# Patient Record
Sex: FEMALE | Race: WHITE | NOT HISPANIC OR LATINO | Employment: FULL TIME | URBAN - METROPOLITAN AREA
[De-identification: names, ages, dates, MRNs, and addresses within clinical notes are randomized per-mention and may not be internally consistent; named-entity substitution may affect disease eponyms.]

---

## 2017-01-03 ENCOUNTER — TELEPHONE (OUTPATIENT)
Dept: FAMILY MEDICINE | Age: 42
End: 2017-01-03

## 2017-01-03 DIAGNOSIS — L98.9 LESION OF SKIN OF FACE: Primary | ICD-10-CM

## 2017-05-24 RX ORDER — LISINOPRIL 20 MG/1
TABLET ORAL
Qty: 30 TABLET | Refills: 10 | OUTPATIENT
Start: 2017-05-24

## 2017-05-24 RX ORDER — LEVOTHYROXINE SODIUM 137 UG/1
TABLET ORAL
Qty: 30 TABLET | Refills: 10 | OUTPATIENT
Start: 2017-05-24

## 2017-05-25 ENCOUNTER — TELEPHONE (OUTPATIENT)
Dept: FAMILY MEDICINE | Age: 42
End: 2017-05-25

## 2017-05-25 ENCOUNTER — LAB SERVICES (OUTPATIENT)
Dept: LAB | Age: 42
End: 2017-05-25

## 2017-05-25 ENCOUNTER — OFFICE VISIT (OUTPATIENT)
Dept: FAMILY MEDICINE | Age: 42
End: 2017-05-25

## 2017-05-25 VITALS
HEART RATE: 70 BPM | SYSTOLIC BLOOD PRESSURE: 118 MMHG | TEMPERATURE: 98.5 F | WEIGHT: 177.5 LBS | DIASTOLIC BLOOD PRESSURE: 72 MMHG | BODY MASS INDEX: 28.53 KG/M2 | HEIGHT: 66 IN

## 2017-05-25 DIAGNOSIS — Z09 FOLLOW-UP EXAMINATION FOLLOWING COMPLETED TREATMENT WITH HIGH-RISK MEDICATIONS, NOT ELSEWHERE CLASSIFIED: Primary | ICD-10-CM

## 2017-05-25 DIAGNOSIS — Z09 FOLLOW-UP EXAMINATION FOLLOWING TREATMENT WITH HIGH-RISK MEDICATION: Primary | ICD-10-CM

## 2017-05-25 DIAGNOSIS — Z09 FOLLOW-UP EXAMINATION FOLLOWING COMPLETED TREATMENT WITH HIGH-RISK MEDICATIONS, NOT ELSEWHERE CLASSIFIED: ICD-10-CM

## 2017-05-25 LAB
ALBUMIN SERPL-MCNC: 3.8 G/DL (ref 3.6–5.1)
ALBUMIN/GLOB SERPL: 1 {RATIO} (ref 1–2.4)
ALP SERPL-CCNC: 89 UNITS/L (ref 45–117)
ALT SERPL-CCNC: 20 UNITS/L
ANION GAP SERPL CALC-SCNC: 15 MMOL/L (ref 10–20)
AST SERPL-CCNC: 15 UNITS/L
BILIRUB SERPL-MCNC: 0.3 MG/DL (ref 0.2–1)
BUN SERPL-MCNC: 11 MG/DL (ref 6–20)
BUN/CREAT SERPL: 17 (ref 7–25)
CALCIUM SERPL-MCNC: 9.1 MG/DL (ref 8.4–10.2)
CHLORIDE SERPL-SCNC: 104 MMOL/L (ref 98–107)
CHOLEST SERPL-MCNC: 229 MG/DL
CHOLEST/HDLC SERPL: 4.2 {RATIO}
CO2 SERPL-SCNC: 24 MMOL/L (ref 21–32)
CREAT SERPL-MCNC: 0.64 MG/DL (ref 0.51–0.95)
FASTING STATUS PATIENT QL REPORTED: 3.5 HRS
GLOBULIN SER-MCNC: 3.7 G/DL (ref 2–4)
GLUCOSE SERPL-MCNC: 117 MG/DL (ref 65–99)
HDLC SERPL-MCNC: 55 MG/DL
LDLC SERPL-MCNC: 129 MG/DL
LENGTH OF FAST TIME PATIENT: 3.5 HRS
NONHDLC SERPL-MCNC: 174 MG/DL
POTASSIUM SERPL-SCNC: 4.3 MMOL/L (ref 3.4–5.1)
PROT SERPL-MCNC: 7.5 G/DL (ref 6.4–8.2)
SODIUM SERPL-SCNC: 139 MMOL/L (ref 135–145)
TRIGL SERPL-MCNC: 227 MG/DL
TSH SERPL-ACNC: 4.95 MCUNITS/ML (ref 0.35–5)

## 2017-05-25 PROCEDURE — 84443 ASSAY THYROID STIM HORMONE: CPT | Performed by: INTERNAL MEDICINE

## 2017-05-25 PROCEDURE — 99214 OFFICE O/P EST MOD 30 MIN: CPT | Performed by: EMERGENCY MEDICINE

## 2017-05-25 PROCEDURE — 80053 COMPREHEN METABOLIC PANEL: CPT | Performed by: INTERNAL MEDICINE

## 2017-05-25 PROCEDURE — 36415 COLL VENOUS BLD VENIPUNCTURE: CPT | Performed by: INTERNAL MEDICINE

## 2017-05-25 RX ORDER — LISINOPRIL 20 MG/1
TABLET ORAL
Qty: 30 TABLET | Refills: 10 | Status: SHIPPED | OUTPATIENT
Start: 2017-05-25

## 2017-05-25 RX ORDER — LEVOTHYROXINE SODIUM 137 UG/1
TABLET ORAL
Qty: 30 TABLET | Refills: 0 | Status: SHIPPED | OUTPATIENT
Start: 2017-05-25 | End: 2017-05-26 | Stop reason: DRUGHIGH

## 2017-05-26 ENCOUNTER — TELEPHONE (OUTPATIENT)
Dept: FAMILY MEDICINE | Age: 42
End: 2017-05-26

## 2017-05-26 DIAGNOSIS — E03.9 HYPOTHYROIDISM, UNSPECIFIED TYPE: Primary | ICD-10-CM

## 2017-05-26 RX ORDER — LEVOTHYROXINE SODIUM 0.15 MG/1
150 TABLET ORAL
Qty: 30 TABLET | Refills: 1 | Status: SHIPPED | OUTPATIENT
Start: 2017-05-26 | End: 2017-08-04 | Stop reason: SDUPTHER

## 2017-06-14 ENCOUNTER — HOSPITAL ENCOUNTER (OUTPATIENT)
Dept: MAMMOGRAPHY | Age: 42
Discharge: HOME | End: 2017-06-14
Attending: EMERGENCY MEDICINE

## 2017-06-14 DIAGNOSIS — Z12.31 VISIT FOR SCREENING MAMMOGRAM: ICD-10-CM

## 2017-06-14 PROCEDURE — G0202 SCR MAMMO BI INCL CAD: HCPCS

## 2017-07-07 ENCOUNTER — TELEPHONE (OUTPATIENT)
Dept: FAMILY MEDICINE | Age: 42
End: 2017-07-07

## 2017-08-03 ENCOUNTER — LAB SERVICES (OUTPATIENT)
Dept: LAB | Age: 42
End: 2017-08-03

## 2017-08-03 DIAGNOSIS — E03.9 HYPOTHYROIDISM, UNSPECIFIED TYPE: ICD-10-CM

## 2017-08-03 LAB — TSH SERPL-ACNC: 0.75 MCUNITS/ML (ref 0.35–5)

## 2017-08-03 PROCEDURE — 36415 COLL VENOUS BLD VENIPUNCTURE: CPT | Performed by: INTERNAL MEDICINE

## 2017-08-03 PROCEDURE — 84443 ASSAY THYROID STIM HORMONE: CPT | Performed by: INTERNAL MEDICINE

## 2017-08-04 ENCOUNTER — TELEPHONE (OUTPATIENT)
Dept: FAMILY MEDICINE | Age: 42
End: 2017-08-04

## 2017-08-04 DIAGNOSIS — E03.9 HYPOTHYROIDISM, UNSPECIFIED TYPE: ICD-10-CM

## 2017-08-04 RX ORDER — LEVOTHYROXINE SODIUM 0.15 MG/1
150 TABLET ORAL DAILY
Qty: 90 TABLET | Refills: 1 | Status: SHIPPED | OUTPATIENT
Start: 2017-08-04 | End: 2018-02-12 | Stop reason: SDUPTHER

## 2017-08-04 RX ORDER — SERTRALINE HYDROCHLORIDE 100 MG/1
100 TABLET, FILM COATED ORAL DAILY
Qty: 90 TABLET | Refills: 0 | Status: SHIPPED | OUTPATIENT
Start: 2017-08-04 | End: 2017-11-20 | Stop reason: SDUPTHER

## 2017-11-20 ENCOUNTER — TELEPHONE (OUTPATIENT)
Dept: FAMILY MEDICINE | Age: 42
End: 2017-11-20

## 2017-11-20 RX ORDER — SERTRALINE HYDROCHLORIDE 100 MG/1
100 TABLET, FILM COATED ORAL DAILY
Qty: 90 TABLET | Refills: 0 | Status: SHIPPED | OUTPATIENT
Start: 2017-11-20

## 2017-11-27 RX ORDER — SERTRALINE HYDROCHLORIDE 100 MG/1
100 TABLET, FILM COATED ORAL DAILY
Qty: 90 TABLET | Refills: 0 | Status: CANCELLED | OUTPATIENT
Start: 2017-11-27

## 2018-02-12 DIAGNOSIS — E03.9 HYPOTHYROIDISM, UNSPECIFIED TYPE: ICD-10-CM

## 2018-02-12 RX ORDER — LEVOTHYROXINE SODIUM 0.15 MG/1
150 TABLET ORAL DAILY
Qty: 90 TABLET | Refills: 0 | Status: SHIPPED | OUTPATIENT
Start: 2018-02-12

## 2018-03-29 ENCOUNTER — OFFICE VISIT NO LOS (OUTPATIENT)
Dept: AUDIOLOGY | Facility: CLINIC | Age: 43
End: 2018-03-29
Payer: COMMERCIAL

## 2018-03-29 DIAGNOSIS — H90.3 SENSORINEURAL HEARING LOSS (SNHL) OF BOTH EARS: Primary | ICD-10-CM

## 2018-03-29 DIAGNOSIS — Z46.1 HEARING AID FITTING OR ADJUSTMENT: ICD-10-CM

## 2018-03-29 PROCEDURE — 92593 PR HEARING AID CHECK, BOTH EARS: CPT | Mod: NC | Performed by: AUDIOLOGIST

## 2018-03-29 NOTE — PROGRESS NOTES
Subjective:  Hearing aid related problems:   Tamiko Tiwari presented to clinic for a hearing aid adjustment; the patient reported her hearing aids are not as loud as she would like them to be.     Hearing Aid Make/Model/Serial#:   R: Leo Halo 2 i2400 [id number]   L: Leo Halo 2 i2400 [id number]    Objective/Assessment:  Visual and listening check of the hearing aid(s) indicated the hearing aids were at full function. Hearing aids were programmed using Real Ear Measures; NAL-NL2 targets were met 250-3000 Hz bilaterally. The hearing aids are essentially max out in power. Tamiko Tiwari was notified there is not much room left in the amplification abilities for her hearing aids. The manufacture was called and indicated new absolute power molds could be made with a more powerful  to provide a fit and this could be completed under warranty until May 2019.  Please see attached programming data sheet for further information. Tamiko Tiwari reported a good quality of sound at a comfortable listening level, and the feeling of balance between ears.     Plan:  Tamiko Tiwari will return to clinic as needed.     JHONNY PADILLA, AUD

## 2018-05-09 ENCOUNTER — OFFICE VISIT (OUTPATIENT)
Dept: URGENT CARE | Facility: URGENT CARE | Age: 43
End: 2018-05-09
Payer: COMMERCIAL

## 2018-05-09 ENCOUNTER — ANCILLARY PROCEDURE (OUTPATIENT)
Dept: RADIOLOGY | Facility: URGENT CARE | Age: 43
End: 2018-05-09
Payer: COMMERCIAL

## 2018-05-09 VITALS
OXYGEN SATURATION: 98 % | DIASTOLIC BLOOD PRESSURE: 90 MMHG | BODY MASS INDEX: 28.93 KG/M2 | HEART RATE: 74 BPM | SYSTOLIC BLOOD PRESSURE: 148 MMHG | WEIGHT: 180 LBS | TEMPERATURE: 97.5 F | HEIGHT: 66 IN | RESPIRATION RATE: 16 BRPM

## 2018-05-09 DIAGNOSIS — M25.512 ACUTE PAIN OF LEFT SHOULDER: Primary | ICD-10-CM

## 2018-05-09 PROCEDURE — 73030 X-RAY EXAM OF SHOULDER: CPT | Mod: TC,LT | Performed by: NURSE PRACTITIONER

## 2018-05-09 PROCEDURE — 99213 OFFICE O/P EST LOW 20 MIN: CPT | Performed by: NURSE PRACTITIONER

## 2018-05-09 RX ORDER — NABUMETONE 750 MG/1
750 TABLET, FILM COATED ORAL 2 TIMES DAILY
Qty: 28 TABLET | Refills: 0 | Status: SHIPPED | OUTPATIENT
Start: 2018-05-09 | End: 2018-05-23

## 2018-05-09 RX ORDER — LEVOTHYROXINE SODIUM 150 UG/1
TABLET ORAL
COMMUNITY
Start: 2018-02-26 | End: 2018-06-04 | Stop reason: SDUPTHER

## 2018-05-09 RX ORDER — CYCLOBENZAPRINE HCL 10 MG
TABLET ORAL
Qty: 30 TABLET | Refills: 1 | Status: SHIPPED | OUTPATIENT
Start: 2018-05-09 | End: 2019-01-23 | Stop reason: ALTCHOICE

## 2018-05-09 ASSESSMENT — PAIN SCALES - GENERAL: PAINLEVEL: 4

## 2018-05-09 NOTE — PROGRESS NOTES
"Subjective      Tamiko Tiwari is a 43 y.o. female who presents for pain in left shoulder for 3 mos.  NKI, pt states the pain is \"aching and annoying but not intolerable\".  No OTC, fulL ROM, pain does not radiate to left arm or into neck, pain is worse with overhead reach or with a deep breath.        Objective   /90 (BP Location: Right arm, Patient Position: Sitting, Cuff Size: Reg)   Pulse 74   Temp 36.4 °C (97.5 °F) (Temporal)   Resp 16   Ht 1.676 m (5' 6\")   Wt 81.6 kg (180 lb)   SpO2 98%   BMI 29.05 kg/m²       Physical Exam    VSS and pt in NAD.  Full ROM to left arm and shoulder,  strong and equal, no bruising or swelling.  TTP over left trapezius and left chest wall, xray neg per radiology overread     Assessment/Plan   Diagnoses and all orders for this visit:    Acute pain of left shoulder  -     X-ray shoulder 2 or more views left (Normal, Clinic Performed)  -     nabumetone (RELAFEN) 750 mg tablet; Take 1 tablet (750 mg total) by mouth 2 (two) times a day for 14 days.  -     cyclobenzaprine (FLEXERIL) 10 mg tablet; 1 tap up to TID PRN, Do not take if you have to drive or work    suspect MSK origin for her pain, discussed normal xray, advised rest, ice, gentle stretching, massage if desired.  Needs to be seen for intolerable pain, difficulty breathing.  Patient verbalizes understanding of above information and will contact [facility] or [facility] with any further questions or concerns        RUDY HAILE, CECILY  "

## 2018-05-14 ENCOUNTER — OFFICE VISIT (OUTPATIENT)
Dept: INTERNAL MEDICINE | Facility: CLINIC | Age: 43
End: 2018-05-14
Payer: COMMERCIAL

## 2018-05-14 VITALS
OXYGEN SATURATION: 96 % | SYSTOLIC BLOOD PRESSURE: 108 MMHG | RESPIRATION RATE: 18 BRPM | BODY MASS INDEX: 28.73 KG/M2 | HEART RATE: 71 BPM | DIASTOLIC BLOOD PRESSURE: 66 MMHG | TEMPERATURE: 97.8 F | WEIGHT: 178 LBS

## 2018-05-14 DIAGNOSIS — M25.512 LEFT SHOULDER PAIN, UNSPECIFIED CHRONICITY: ICD-10-CM

## 2018-05-14 DIAGNOSIS — E03.9 ACQUIRED HYPOTHYROIDISM: ICD-10-CM

## 2018-05-14 DIAGNOSIS — Z13.220 SCREENING FOR HYPERLIPIDEMIA: ICD-10-CM

## 2018-05-14 DIAGNOSIS — Z00.01 ENCOUNTER FOR ROUTINE ADULT HEALTH EXAMINATION WITH ABNORMAL FINDINGS: Primary | ICD-10-CM

## 2018-05-14 DIAGNOSIS — I10 ESSENTIAL HYPERTENSION: ICD-10-CM

## 2018-05-14 PROCEDURE — 99204 OFFICE O/P NEW MOD 45 MIN: CPT | Performed by: NURSE PRACTITIONER

## 2018-05-14 ASSESSMENT — ENCOUNTER SYMPTOMS
GASTROINTESTINAL NEGATIVE: 1
PALPITATIONS: 0
BACK PAIN: 0
SLEEP DISTURBANCE: 1
ARTHRALGIAS: 1
HEMATOLOGIC/LYMPHATIC NEGATIVE: 1
JOINT SWELLING: 0
NECK PAIN: 0
SHORTNESS OF BREATH: 0
NECK STIFFNESS: 0
APPETITE CHANGE: 0
ACTIVITY CHANGE: 0
CHILLS: 0
NEUROLOGICAL NEGATIVE: 1
MYALGIAS: 1
COUGH: 1
FEVER: 0

## 2018-05-14 ASSESSMENT — PAIN SCALES - GENERAL: PAINLEVEL: 4

## 2018-05-14 NOTE — PATIENT INSTRUCTIONS
Left shoulder pain is likely coming from a muscle strain.  Rotator cuff checks out normal.  Recommend massage or chiropractor ([facility]) for adjustment with massage after.  Be sure to take medication before adjustment.  Flexeril before bed to help with pain.  May also apply heat or ice or both.      We will get you set up with a physical appointment with fasting labs.
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
## 2018-05-24 DIAGNOSIS — E03.9 HYPOTHYROIDISM, UNSPECIFIED TYPE: ICD-10-CM

## 2018-05-24 RX ORDER — SERTRALINE HYDROCHLORIDE 100 MG/1
100 TABLET, FILM COATED ORAL DAILY
Qty: 90 TABLET | Refills: 0 | OUTPATIENT
Start: 2018-05-24

## 2018-05-24 RX ORDER — LISINOPRIL 20 MG/1
TABLET ORAL
Qty: 30 TABLET | Refills: 0 | OUTPATIENT
Start: 2018-05-24

## 2018-05-24 RX ORDER — LEVOTHYROXINE SODIUM 0.15 MG/1
TABLET ORAL
Qty: 90 TABLET | Refills: 0 | OUTPATIENT
Start: 2018-05-24

## 2018-05-25 ENCOUNTER — OFFICE VISIT NO LOS (OUTPATIENT)
Dept: AUDIOLOGY | Facility: CLINIC | Age: 43
End: 2018-05-25
Payer: COMMERCIAL

## 2018-05-25 DIAGNOSIS — Z97.4 USES HEARING AID: ICD-10-CM

## 2018-05-25 DIAGNOSIS — H90.3 SENSORINEURAL HEARING LOSS (SNHL) OF BOTH EARS: Primary | ICD-10-CM

## 2018-05-25 PROCEDURE — 92592 PR HEARING AID CHECK, ONE EAR: CPT | Mod: NC | Performed by: AUDIOLOGIST

## 2018-05-25 NOTE — PROGRESS NOTES
Subjective:  Hearing aid related problems:   Tamiko Tiwari presented to clinic reporting her left hear was not working, and had made some beeps earlier in the week before it quite working.    Hearing Aid Make/Model/Serial#:   R: Leo Wayne 2 i2400 [id number]   L: Leo Wayne 2 i2400 [id number]    Objective/Assessment:  Visual and listening check of the hearing aid(s) indicated the hearing aid was indeed dead despite trouble shooting efforts. The sounds the hearing aid was making, was most likely the internal error noise. Tamiko Tiwari was informed the hearing aid would need to be sent to [facility] for manufacture repair under warranty. Repair was sent in with extra note to change  strength from 50 gain to 60 or 70 gain. Tamiko Tiwari will be contacted to  the hearing aid when it arrives in clinic. She will need an adjustment appointment at that time.     Plan:  Tamiko Tiwari will return to clinic when aid returs.     JHONNY PADILLA, AUD

## 2018-05-31 ENCOUNTER — TELEPHONE (OUTPATIENT)
Dept: INTERNAL MEDICINE | Facility: CLINIC | Age: 43
End: 2018-05-31

## 2018-05-31 ENCOUNTER — APPOINTMENT (OUTPATIENT)
Dept: LAB | Facility: CLINIC | Age: 43
End: 2018-05-31
Payer: COMMERCIAL

## 2018-05-31 DIAGNOSIS — I10 ESSENTIAL HYPERTENSION: ICD-10-CM

## 2018-05-31 DIAGNOSIS — M25.512 LEFT SHOULDER PAIN, UNSPECIFIED CHRONICITY: ICD-10-CM

## 2018-05-31 DIAGNOSIS — Z13.220 SCREENING FOR HYPERLIPIDEMIA: ICD-10-CM

## 2018-05-31 DIAGNOSIS — E03.9 ACQUIRED HYPOTHYROIDISM: ICD-10-CM

## 2018-05-31 LAB
ALBUMIN SERPL-MCNC: 4 G/DL (ref 3.5–5.3)
ALP SERPL-CCNC: 82 U/L (ref 37–98)
ALT SERPL-CCNC: 14 U/L (ref 0–52)
ANION GAP SERPL CALC-SCNC: 8 MMOL/L (ref 3–11)
AST SERPL-CCNC: 15 U/L (ref 0–39)
BASOPHILS # BLD AUTO: 0.1 10*3/UL
BASOPHILS NFR BLD AUTO: 1 % (ref 0–2)
BILIRUB SERPL-MCNC: 0.53 MG/DL (ref 0–1.4)
BUN SERPL-MCNC: 19 MG/DL (ref 7–25)
CALCIUM ALBUM COR SERPL-MCNC: 9.2 MG/DL (ref 8.5–10.1)
CALCIUM SERPL-MCNC: 9.2 MG/DL (ref 8.6–10.3)
CHLORIDE SERPL-SCNC: 102 MMOL/L (ref 98–107)
CHOLEST SERPL-MCNC: 216 MG/DL (ref 0–199)
CO2 SERPL-SCNC: 26 MMOL/L (ref 21–32)
CREAT SERPL-MCNC: 0.7 MG/DL (ref 0.6–1.2)
EOSINOPHIL # BLD AUTO: 0.5 10*3/UL
EOSINOPHIL NFR BLD AUTO: 7 % (ref 0–3)
ERYTHROCYTE [DISTWIDTH] IN BLOOD BY AUTOMATED COUNT: 15.9 % (ref 11.5–14)
FASTING STATUS PATIENT QL REPORTED: YES
GFR SERPL CREATININE-BSD FRML MDRD: 91 ML/MIN/1.73M*2
GLUCOSE SERPL-MCNC: 95 MG/DL (ref 70–105)
HCT VFR BLD AUTO: 37.9 % (ref 34–45)
HDLC SERPL-MCNC: 49 MG/DL
HGB BLD-MCNC: 12.7 G/DL (ref 11.5–15.5)
LDLC SERPL CALC-MCNC: 139 MG/DL (ref 0–99)
LYMPHOCYTES # BLD AUTO: 2.1 10*3/UL
LYMPHOCYTES NFR BLD AUTO: 29 % (ref 11–47)
MAGNESIUM SERPL-MCNC: 2.1 MG/DL (ref 1.8–2.4)
MCH RBC QN AUTO: 28.3 PG (ref 28–33)
MCHC RBC AUTO-ENTMCNC: 33.6 G/DL (ref 32–36)
MCV RBC AUTO: 84.1 FL (ref 81–97)
MONOCYTES # BLD AUTO: 0.7 10*3/UL
MONOCYTES NFR BLD AUTO: 10 % (ref 3–11)
NEUTROPHILS # BLD AUTO: 3.8 10*3/UL
NEUTROPHILS NFR BLD AUTO: 53 % (ref 41–81)
PHOSPHATE SERPL-MCNC: 3.3 MG/DL (ref 2.5–4.9)
PLATELET # BLD AUTO: 307 10*3/UL (ref 140–350)
PMV BLD AUTO: 8.5 FL (ref 6.9–10.8)
POTASSIUM SERPL-SCNC: 4 MMOL/L (ref 3.5–5.1)
PROT SERPL-MCNC: 7.2 G/DL (ref 6–8.3)
RBC # BLD AUTO: 4.5 10*6/ΜL (ref 3.7–5.3)
SODIUM SERPL-SCNC: 136 MMOL/L (ref 135–145)
TRIGL SERPL-MCNC: 138 MG/DL
TSH SERPL DL<=0.05 MIU/L-ACNC: 1.86 UIU/ML (ref 0.34–4.82)
WBC # BLD AUTO: 7.1 10*3/UL (ref 4.5–10.5)

## 2018-05-31 PROCEDURE — 83735 ASSAY OF MAGNESIUM: CPT

## 2018-05-31 PROCEDURE — 36415 COLL VENOUS BLD VENIPUNCTURE: CPT

## 2018-05-31 PROCEDURE — 80050 GENERAL HEALTH PANEL: CPT

## 2018-05-31 PROCEDURE — 84100 ASSAY OF PHOSPHORUS: CPT

## 2018-05-31 PROCEDURE — 80061 LIPID PANEL: CPT

## 2018-05-31 PROCEDURE — 82306 VITAMIN D 25 HYDROXY: CPT | Mod: GZ

## 2018-06-01 LAB
25(OH)D2 SERPL-MCNC: <4 NG/ML
25(OH)D3 SERPL-MCNC: 17 NG/ML
25(OH)D3+25(OH)D2 SERPL-MCNC: 17 NG/ML

## 2018-06-04 ENCOUNTER — TELEPHONE (OUTPATIENT)
Dept: INTERNAL MEDICINE | Facility: CLINIC | Age: 43
End: 2018-06-04

## 2018-06-04 DIAGNOSIS — R79.89 LOW VITAMIN D LEVEL: Primary | ICD-10-CM

## 2018-06-04 DIAGNOSIS — G47.00 INSOMNIA, UNSPECIFIED TYPE: ICD-10-CM

## 2018-06-04 DIAGNOSIS — E03.9 ACQUIRED HYPOTHYROIDISM: Primary | ICD-10-CM

## 2018-06-04 DIAGNOSIS — I10 ESSENTIAL (PRIMARY) HYPERTENSION: ICD-10-CM

## 2018-06-04 RX ORDER — LEVOTHYROXINE SODIUM 150 UG/1
150 TABLET ORAL DAILY
Qty: 30 TABLET | Refills: 0 | Status: SHIPPED | OUTPATIENT
Start: 2018-06-04 | End: 2018-07-05 | Stop reason: SDUPTHER

## 2018-06-04 RX ORDER — LISINOPRIL 20 MG/1
20 TABLET ORAL DAILY
Qty: 30 TABLET | Refills: 0 | Status: SHIPPED | OUTPATIENT
Start: 2018-06-04 | End: 2018-06-14 | Stop reason: SDUPTHER

## 2018-06-04 RX ORDER — ERGOCALCIFEROL 1.25 MG/1
50000 CAPSULE ORAL WEEKLY
Qty: 6 CAPSULE | Refills: 0 | Status: SHIPPED | OUTPATIENT
Start: 2018-06-04 | End: 2019-01-23 | Stop reason: ALTCHOICE

## 2018-06-04 RX ORDER — SERTRALINE HYDROCHLORIDE 100 MG/1
100 TABLET, FILM COATED ORAL DAILY
Qty: 30 TABLET | Refills: 0 | Status: SHIPPED | OUTPATIENT
Start: 2018-06-04 | End: 2018-07-05 | Stop reason: SDUPTHER

## 2018-06-04 NOTE — TELEPHONE ENCOUNTER
Pt called to request refills of her meds for lisinopril, levothyroxine and sertraline. She changed her apptmt to see Dina on 6/14 for a physical. Please send refills to [facility]. She will be out of medication by Wednesday. Thank you.

## 2018-06-08 ENCOUNTER — OFFICE VISIT NO LOS (OUTPATIENT)
Dept: AUDIOLOGY | Facility: CLINIC | Age: 43
End: 2018-06-08
Payer: COMMERCIAL

## 2018-06-08 DIAGNOSIS — Z46.1 HEARING AID FITTING OR ADJUSTMENT: ICD-10-CM

## 2018-06-08 DIAGNOSIS — H90.3 SENSORINEURAL HEARING LOSS (SNHL) OF BOTH EARS: Primary | ICD-10-CM

## 2018-06-08 PROCEDURE — 92593 PR HEARING AID CHECK, BOTH EARS: CPT | Mod: NC | Performed by: AUDIOLOGIST

## 2018-06-08 NOTE — PROGRESS NOTES
Subjective:   Tamiko Tiwari presented to clinic reporting to  their hearing aid after being repaired by the manufacture under warranty    Hearing Aid Make/Model/Serial#:   R: Leo Halo 2 i2400 [id number]   L: Leo Halo 2 i2400 [id number]    Objective/Assessment:  The hearing aid(s) were connected to the programming software where previous settings and the connection between aids was restored. Due to the increase in  matrix, left hearing aid was reprogrammed using Real Ear Measures; NAL-NL2 targets were met 250-4000 Hz.     Tamiko Tiwari reported a good quality of sound at a comfortable listening level, and the feeling of balance between ears.    Tamiko Tiwari requested additional dry and store bricks. She was informed of the $20 cost, and paid up front on her way out.     Plan:  Tamiko Tiwari will return to clinic as needed.     JHONNY PADILLA, AUD

## 2018-06-14 ENCOUNTER — OFFICE VISIT (OUTPATIENT)
Dept: INTERNAL MEDICINE | Facility: CLINIC | Age: 43
End: 2018-06-14
Payer: COMMERCIAL

## 2018-06-14 ENCOUNTER — ANCILLARY PROCEDURE (OUTPATIENT)
Dept: MAMMOGRAPHY | Facility: CLINIC | Age: 43
End: 2018-06-14
Payer: COMMERCIAL

## 2018-06-14 ENCOUNTER — ANCILLARY ORDERS (OUTPATIENT)
Dept: MAMMOGRAPHY | Facility: CLINIC | Age: 43
End: 2018-06-14
Payer: COMMERCIAL

## 2018-06-14 VITALS
RESPIRATION RATE: 12 BRPM | OXYGEN SATURATION: 96 % | WEIGHT: 176 LBS | DIASTOLIC BLOOD PRESSURE: 70 MMHG | BODY MASS INDEX: 28.41 KG/M2 | HEART RATE: 87 BPM | SYSTOLIC BLOOD PRESSURE: 106 MMHG | TEMPERATURE: 97.1 F

## 2018-06-14 DIAGNOSIS — I10 ESSENTIAL (PRIMARY) HYPERTENSION: ICD-10-CM

## 2018-06-14 DIAGNOSIS — Z00.00 ENCOUNTER FOR ROUTINE ADULT HEALTH EXAMINATION WITHOUT ABNORMAL FINDINGS: Primary | ICD-10-CM

## 2018-06-14 DIAGNOSIS — E55.9 VITAMIN D DEFICIENCY: ICD-10-CM

## 2018-06-14 DIAGNOSIS — Z12.39 SCREENING FOR BREAST CANCER: ICD-10-CM

## 2018-06-14 DIAGNOSIS — Z12.39 BREAST SCREENING: ICD-10-CM

## 2018-06-14 DIAGNOSIS — E03.9 ACQUIRED HYPOTHYROIDISM: ICD-10-CM

## 2018-06-14 PROCEDURE — 99396 PREV VISIT EST AGE 40-64: CPT | Performed by: NURSE PRACTITIONER

## 2018-06-14 PROCEDURE — 77067 SCR MAMMO BI INCL CAD: CPT | Mod: TC | Performed by: NURSE PRACTITIONER

## 2018-06-14 RX ORDER — LISINOPRIL 20 MG/1
10 TABLET ORAL DAILY
Start: 2018-06-14 | End: 2019-08-09 | Stop reason: ALTCHOICE

## 2018-06-14 ASSESSMENT — PAIN SCALES - GENERAL: PAINLEVEL: 0-NO PAIN

## 2018-06-18 PROBLEM — E55.9 VITAMIN D DEFICIENCY: Status: ACTIVE | Noted: 2018-06-18

## 2018-06-18 PROBLEM — Z00.00 ENCOUNTER FOR ROUTINE ADULT HEALTH EXAMINATION WITHOUT ABNORMAL FINDINGS: Status: ACTIVE | Noted: 2018-05-14

## 2018-06-18 ASSESSMENT — ENCOUNTER SYMPTOMS
ABDOMINAL PAIN: 0
DIZZINESS: 1
DIFFICULTY URINATING: 0
CONSTIPATION: 0
FEVER: 0
DIAPHORESIS: 0
SHORTNESS OF BREATH: 0
DIARRHEA: 0
HEADACHES: 0
NAUSEA: 0
APPETITE CHANGE: 0
ACTIVITY CHANGE: 0
VOMITING: 0
ARTHRALGIAS: 1
COUGH: 0
STRIDOR: 0
UNEXPECTED WEIGHT CHANGE: 0
TROUBLE SWALLOWING: 0
FATIGUE: 0
DYSPHORIC MOOD: 0
PALPITATIONS: 0

## 2018-06-18 NOTE — PROGRESS NOTES
" Follow Up     Subjective      Patient ID: Tamiko Tiwari is a 43 y.o. female.    HPI  Patient presents today for annual physical as well as follow-up of multiple health conditions.  She has been working on losing weight.  She started the profile diet approximately 1 month ago.  She has lost about 10 pounds in that time.  She seems to have plateaued with her weight loss and is a little frustrated with this.  She has noted recently some dizziness and feeling like it is \"hard to breathe while working in the garden\".  She has been following the diet but has not been doing any type of routine exercise.  Other than that she has no other concerns today.    Review of Systems   Constitutional: Negative for activity change, appetite change, diaphoresis, fatigue, fever and unexpected weight change.   HENT: Negative for trouble swallowing.    Eyes: Negative for visual disturbance.   Respiratory: Negative for cough, shortness of breath and stridor.    Cardiovascular: Negative for chest pain, palpitations and leg swelling.   Gastrointestinal: Negative for abdominal pain, constipation, diarrhea, nausea and vomiting.   Genitourinary: Negative for difficulty urinating.   Musculoskeletal: Positive for arthralgias (Mild left shoulder pain).   Neurological: Positive for dizziness (Per HPI). Negative for headaches.   Psychiatric/Behavioral: Negative for dysphoric mood.            Current Outpatient Prescriptions:   •  ergocalciferol (VITAMIN D2) 50,000 unit capsule, Take 1 capsule (50,000 Units total) by mouth once a week., Disp: 6 capsule, Rfl: 0  •  levothyroxine (SYNTHROID, LEVOTHROID) 150 mcg tablet, Take 1 tablet (150 mcg total) by mouth daily., Disp: 30 tablet, Rfl: 0  •  lisinopril (PRINIVIL,ZESTRIL) 20 mg tablet, Take 0.5 tablets (10 mg total) by mouth daily., Disp: , Rfl:   •  sertraline (ZOLOFT) 100 mg tablet, Take 1 tablet (100 mg total) by mouth daily., Disp: 30 tablet, Rfl: 0  •  cyclobenzaprine (FLEXERIL) 10 mg tablet, 1 " tap up to TID PRN, Do not take if you have to drive or work (Patient not taking: Reported on 2018 ), Disp: 30 tablet, Rfl: 1      No Known Allergies       Past Surgical History:   Procedure Laterality Date   •  SECTION           Family History   Problem Relation Age of Onset   • Prostate cancer Father    • Hypertension Mother    • Other Sister      Hysterectomy due to benign tumors    • No Known Problems Brother    • Heart failure Maternal Grandmother    • Alcohol abuse Maternal Grandfather    • Pacemaker/AICD Paternal Grandmother    • Alcohol abuse Paternal Grandfather    • Tachycardia Sister    • Other Sister      Recent abnormal mammogram; benign   • Multiple sclerosis Father's Brother    • Cancer Mother's Sister      unsure of what type   • Other Mother's Brother    • Stroke Mother's Brother          Past Medical History:   Diagnosis Date   • Depression    • Hypertension    • Hypothyroid          Social History     Social History   • Marital status:      Spouse name: Carl   • Number of children: 1   • Years of education: N/A     Occupational History   • 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 She appears well-developed and well-nourished. No distress.   HENT:   Head: Normocephalic and atraumatic.   Right Ear: External ear normal.   Left Ear: External ear normal.   Nose: Nose normal.   Mouth/Throat: Oropharynx is clear and moist. No oropharyngeal exudate.   Eyes: EOM are normal. Pupils are equal, round, and reactive to light.   Neck: Normal range of  motion. No tracheal deviation present. No thyromegaly present.   Cardiovascular: Normal rate, regular rhythm and normal heart sounds.    Pulmonary/Chest: Effort normal and breath sounds normal. No respiratory distress.   Abdominal: Soft. Bowel sounds are normal. She exhibits no distension. There is no tenderness. There is no guarding.   Genitourinary: Vagina normal and uterus normal. Pelvic exam was performed with patient supine. Cervix exhibits no motion tenderness, no discharge and no friability. Right adnexum displays no mass, no tenderness and no fullness. Left adnexum displays no mass, no tenderness and no fullness. No erythema or tenderness in the vagina. No vaginal discharge found.   Musculoskeletal: Normal range of motion.   Lymphadenopathy:     She has no cervical adenopathy.   Neurological: She is alert and oriented to person, place, and time.   Skin: Skin is warm and dry. Capillary refill takes less than 2 seconds.   Psychiatric: She has a normal mood and affect.     /70   Pulse 87   Temp 36.2 °C (97.1 °F)   Resp 12   Wt 79.8 kg (176 lb)   LMP 05/31/2018   SpO2 96%   BMI 28.41 kg/m²     Labs:   Automated Differential:   Lab Results   Component Value Date    NEUTROABS 3.80 05/31/2018    LYMPHOPCT 29 05/31/2018    MONOPCT 10 05/31/2018    MONOSABS 0.70 05/31/2018    EOSPCT 7 (H) 05/31/2018    EOSABS 0.50 05/31/2018    BASOSABS 0.10 05/31/2018    BASOPCT 1 05/31/2018     CBC with Platelet:    Lab Results   Component Value Date    WBC 7.1 05/31/2018    HGB 12.7 05/31/2018    HCT 37.9 05/31/2018     05/31/2018    RBC 4.50 05/31/2018    MCV 84.1 05/31/2018    MCH 28.3 05/31/2018    MCHC 33.6 05/31/2018    RDW 15.9 (H) 05/31/2018    MPV 8.5 05/31/2018     Comp:   Lab Results   Component Value Date     05/31/2018    K 4.0 05/31/2018     05/31/2018    CO2 26 05/31/2018    BUN 19 05/31/2018    CREATININE 0.7 05/31/2018    GLUCOSE 95 05/31/2018    CALCIUM 9.2 05/31/2018    PROT 7.2  05/31/2018    ALBUMIN 4.0 05/31/2018    AST 15 05/31/2018    ALT 14 05/31/2018    ALKPHOS 82 05/31/2018    BILITOT 0.53 05/31/2018     Lipid:   Lab Results   Component Value Date    CHOL 178 06/07/2018    TRIG 72 06/07/2018    HDL 49 06/07/2018     Magnesium:   Lab Results   Component Value Date    MG 2.1 05/31/2018     TSH:   Lab Results   Component Value Date    TSH 1.864 05/31/2018       Assessment and Plan:    1. Encounter for routine adult health examination without abnormal findings  Physical exam including Pap and pelvic performed today.  Patient deferred breast exam as she will have a mammogram immediately following her visit today.  Patient recently had lab work done.  These were reviewed and addressed by her PCP.    2. Screening  Patient's last Pap 2011.  Will get both cytology and HPV testing.  - Pap Smear    3. Essential (primary) hypertension  Patient has noted some dizziness and shortness of breath while working in the garden.  Her blood pressure is a little on the lower side.  She has recently lost 10 pounds.  Instructed patient to make sure she is getting enough fluid.  We will also decrease her blood pressure medication.  Discussed with patient intermittent checking of her blood pressure.  Have her come in in about a month to have her blood pressure check with the nurse.    - lisinopril (PRINIVIL,ZESTRIL) 20 mg tablet; Take 0.5 tablets (10 mg total) by mouth daily.    4. Vitamin D deficiency  Patient is currently taking high-dose vitamin D as prescribed by her PCP.    5. Acquired hypothyroidism  Patient's most recent TSH was in range.  No recent changes of thyroid medication.    All other medical conditions are stable.  Contact patient with results of screening.  Patient will otherwise follow-up with her PCP.      A voice recognition program was used to aid in medical record documentation. Sometimes words are printed not exactly as they were spoken. While efforts were made to carefully edit and  correct any inaccuracies, some errors may be present and should be taken within the context of the discussion.  Please contact our office if you need assistance interpreting this medical record or notice any mistakes.    Dina Zepeda, CECILY  06/18/18

## 2018-06-25 DIAGNOSIS — R92.8 ABNORMAL MAMMOGRAM OF RIGHT BREAST: Primary | ICD-10-CM

## 2018-06-26 DIAGNOSIS — R92.8 ABNORMAL MAMMOGRAM: Primary | ICD-10-CM

## 2018-07-05 DIAGNOSIS — E03.9 ACQUIRED HYPOTHYROIDISM: ICD-10-CM

## 2018-07-05 DIAGNOSIS — G47.00 INSOMNIA, UNSPECIFIED TYPE: ICD-10-CM

## 2018-07-05 DIAGNOSIS — I10 ESSENTIAL (PRIMARY) HYPERTENSION: ICD-10-CM

## 2018-07-05 RX ORDER — LEVOTHYROXINE SODIUM 150 UG/1
TABLET ORAL
Qty: 90 TABLET | Refills: 0 | Status: SHIPPED | OUTPATIENT
Start: 2018-07-05 | End: 2018-09-30 | Stop reason: SDUPTHER

## 2018-07-05 RX ORDER — LISINOPRIL 20 MG/1
TABLET ORAL
Qty: 90 TABLET | Refills: 0 | Status: SHIPPED | OUTPATIENT
Start: 2018-07-05 | End: 2018-09-30 | Stop reason: SDUPTHER

## 2018-07-05 RX ORDER — SERTRALINE HYDROCHLORIDE 100 MG/1
TABLET, FILM COATED ORAL
Qty: 90 TABLET | Refills: 0 | Status: SHIPPED | OUTPATIENT
Start: 2018-07-05 | End: 2018-09-30 | Stop reason: SDUPTHER

## 2018-07-11 ENCOUNTER — TELEPHONE (OUTPATIENT)
Dept: INTERNAL MEDICINE | Facility: CLINIC | Age: 43
End: 2018-07-11

## 2018-07-11 DIAGNOSIS — N64.89 BREAST ASYMMETRY: Primary | ICD-10-CM

## 2018-07-11 NOTE — TELEPHONE ENCOUNTER
----- Message from Alyson Bauer MD sent at 7/10/2018  9:13 AM MDT -----  Please order diagnostic mammogram on R for 6 mo

## 2018-09-30 DIAGNOSIS — G47.00 INSOMNIA, UNSPECIFIED TYPE: ICD-10-CM

## 2018-09-30 DIAGNOSIS — I10 ESSENTIAL (PRIMARY) HYPERTENSION: ICD-10-CM

## 2018-09-30 DIAGNOSIS — E03.9 ACQUIRED HYPOTHYROIDISM: ICD-10-CM

## 2018-10-01 RX ORDER — LISINOPRIL 20 MG/1
TABLET ORAL
Qty: 90 TABLET | Refills: 0 | Status: SHIPPED | OUTPATIENT
Start: 2018-10-01 | End: 2018-12-24 | Stop reason: SDUPTHER

## 2018-10-01 RX ORDER — SERTRALINE HYDROCHLORIDE 100 MG/1
TABLET, FILM COATED ORAL
Qty: 90 TABLET | Refills: 0 | Status: SHIPPED | OUTPATIENT
Start: 2018-10-01 | End: 2018-12-24 | Stop reason: SDUPTHER

## 2018-10-01 RX ORDER — LEVOTHYROXINE SODIUM 150 UG/1
TABLET ORAL
Qty: 90 TABLET | Refills: 0 | Status: SHIPPED | OUTPATIENT
Start: 2018-10-01 | End: 2018-12-24 | Stop reason: SDUPTHER

## 2018-10-29 ENCOUNTER — OFFICE VISIT (OUTPATIENT)
Dept: URGENT CARE | Facility: URGENT CARE | Age: 43
End: 2018-10-29
Payer: COMMERCIAL

## 2018-10-29 VITALS
SYSTOLIC BLOOD PRESSURE: 140 MMHG | BODY MASS INDEX: 32.39 KG/M2 | OXYGEN SATURATION: 95 % | HEIGHT: 60 IN | TEMPERATURE: 97.9 F | WEIGHT: 165 LBS | DIASTOLIC BLOOD PRESSURE: 94 MMHG | RESPIRATION RATE: 20 BRPM | HEART RATE: 74 BPM

## 2018-10-29 DIAGNOSIS — J02.9 SORE THROAT: Primary | ICD-10-CM

## 2018-10-29 DIAGNOSIS — J02.9 PHARYNGITIS, UNSPECIFIED ETIOLOGY: ICD-10-CM

## 2018-10-29 LAB — GP B STREP AG SPEC QL: NEGATIVE

## 2018-10-29 PROCEDURE — 99213 OFFICE O/P EST LOW 20 MIN: CPT | Performed by: PHYSICIAN ASSISTANT

## 2018-10-29 PROCEDURE — 87070 CULTURE OTHR SPECIMN AEROBIC: CPT | Performed by: PHYSICIAN ASSISTANT

## 2018-10-29 PROCEDURE — 87880 STREP A ASSAY W/OPTIC: CPT | Performed by: PHYSICIAN ASSISTANT

## 2018-10-29 ASSESSMENT — PAIN SCALES - GENERAL: PAINLEVEL: 6

## 2018-10-29 NOTE — PROGRESS NOTES
Subjective      HPI    Tamiko Twiari is a 43 y.o. female who presents for sore throat.  This started yesterday.  She has no cough cold runny nose fevers body aches chills nausea vomiting etc.      Review of Systems    As noted in HPI.      Objective   /94   Pulse 74   Temp 36.6 °C (97.9 °F)   Resp 20   Ht 1.524 m (5')   Wt 74.8 kg (165 lb)   SpO2 95%   BMI 32.22 kg/m²     Physical Exam   Constitutional: She is oriented to person, place, and time. She appears well-developed and well-nourished.   HENT:   Head: Normocephalic and atraumatic.   Right Ear: Hearing, tympanic membrane, external ear and ear canal normal.   Left Ear: Hearing, tympanic membrane, external ear and ear canal normal.   Nose: Nose normal.   Mouth/Throat: Uvula is midline, oropharynx is clear and moist and mucous membranes are normal.   Mild erythema of uvula   Eyes: Conjunctivae and EOM are normal. Pupils are equal, round, and reactive to light.   Neck: Normal range of motion. Neck supple.   Cardiovascular: Normal rate, regular rhythm and normal heart sounds. Exam reveals no gallop and no friction rub.   No murmur heard.  Pulmonary/Chest: Effort normal and breath sounds normal.   Musculoskeletal: Normal range of motion.   Lymphadenopathy:     She has no cervical adenopathy.   Neurological: She is alert and oriented to person, place, and time.   Skin: Skin is warm and dry.   Psychiatric: She has a normal mood and affect. Her behavior is normal. Judgment and thought content normal.   Nursing note and vitals reviewed.      Recent Results (from the past 4 hour(s))   Rapid Strep Screen Swab    Collection Time: 10/29/18  7:59 AM   Result Value Ref Range    Strep A Screen Negative Negative             Assessment/Plan   Diagnoses and all orders for this visit:    Sore throat  -     Rapid Strep Screen Swab  -     Throat culture    Pharyngitis, unspecified etiology        Discussion: This most likely viral in nature with a negative strep test  will be verified with culture will notify patient of results when in  Continue symptomatic cares including saline nasal rinse, hot steamy showers, decongestants, salt water gargle, tylenol and ibuprofen for fever/pain, mucolytics,antitussivesm, increased fluids and rest.           BRYCE Crowder

## 2018-10-31 LAB — BACTERIA THROAT CULT: NORMAL

## 2018-12-24 DIAGNOSIS — G47.00 INSOMNIA, UNSPECIFIED TYPE: ICD-10-CM

## 2018-12-24 DIAGNOSIS — E03.9 ACQUIRED HYPOTHYROIDISM: ICD-10-CM

## 2018-12-24 DIAGNOSIS — I10 ESSENTIAL (PRIMARY) HYPERTENSION: ICD-10-CM

## 2018-12-24 RX ORDER — SERTRALINE HYDROCHLORIDE 100 MG/1
TABLET, FILM COATED ORAL
Qty: 90 TABLET | Refills: 0 | Status: SHIPPED | OUTPATIENT
Start: 2018-12-24 | End: 2019-03-25 | Stop reason: SDUPTHER

## 2018-12-24 RX ORDER — LEVOTHYROXINE SODIUM 150 UG/1
TABLET ORAL
Qty: 90 TABLET | Refills: 0 | Status: SHIPPED | OUTPATIENT
Start: 2018-12-24 | End: 2019-03-25 | Stop reason: SDUPTHER

## 2018-12-24 RX ORDER — LISINOPRIL 20 MG/1
TABLET ORAL
Qty: 90 TABLET | Refills: 0 | Status: SHIPPED | OUTPATIENT
Start: 2018-12-24 | End: 2019-01-23 | Stop reason: ALTCHOICE

## 2019-01-25 ENCOUNTER — OFFICE VISIT (OUTPATIENT)
Dept: INTERNAL MEDICINE | Facility: CLINIC | Age: 44
End: 2019-01-25
Payer: COMMERCIAL

## 2019-01-25 ENCOUNTER — ANCILLARY PROCEDURE (OUTPATIENT)
Dept: RADIOLOGY | Facility: CLINIC | Age: 44
End: 2019-01-25
Payer: COMMERCIAL

## 2019-01-25 VITALS
TEMPERATURE: 99.2 F | OXYGEN SATURATION: 96 % | DIASTOLIC BLOOD PRESSURE: 78 MMHG | HEIGHT: 60 IN | WEIGHT: 171 LBS | RESPIRATION RATE: 16 BRPM | BODY MASS INDEX: 33.57 KG/M2 | SYSTOLIC BLOOD PRESSURE: 118 MMHG | HEART RATE: 77 BPM

## 2019-01-25 DIAGNOSIS — R05.9 COUGH: ICD-10-CM

## 2019-01-25 DIAGNOSIS — R05.9 COUGH: Primary | ICD-10-CM

## 2019-01-25 PROCEDURE — 71046 X-RAY EXAM CHEST 2 VIEWS: CPT | Mod: TC | Performed by: NURSE PRACTITIONER

## 2019-01-25 PROCEDURE — 99213 OFFICE O/P EST LOW 20 MIN: CPT | Performed by: NURSE PRACTITIONER

## 2019-01-25 RX ORDER — ALBUTEROL SULFATE 90 UG/1
2 INHALANT RESPIRATORY (INHALATION)
Qty: 1 INHALER | Refills: 1 | Status: SHIPPED | OUTPATIENT
Start: 2019-01-25

## 2019-01-25 ASSESSMENT — ENCOUNTER SYMPTOMS
CHILLS: 0
SINUS PAIN: 0
FEVER: 0
ARTHRALGIAS: 1
SINUS PRESSURE: 0
RHINORRHEA: 1
COUGH: 1
HEADACHES: 1
SLEEP DISTURBANCE: 0
GASTROINTESTINAL NEGATIVE: 1
WHEEZING: 0
SHORTNESS OF BREATH: 0
FATIGUE: 1

## 2019-01-25 NOTE — PROGRESS NOTES
"Deanne Morrison is a very pleasant 43-year-old female patient that I am seeing today for evaluation of a persistent cough that she has been experiencing since November.  She reports that her cough seems to be worse in the morning, and describes it as a nonproductive, dry cough.  She denies any fever or chills, but she does report that she is significantly fatigued.  She also feels as if she has a \"chest heaviness\" when that she is about to experience a coughing spell.  She also feels as if she has a significant tickle in her throat most of the time.  She has taken no new medications, although she does take 10 mg of lisinopril once daily.  She reports that she has been on this for several years.          Medications    Current Outpatient Medications:   •  levothyroxine (SYNTHROID, LEVOTHROID) 150 mcg tablet, TAKE 1 TABLET(150 MCG) BY MOUTH DAILY, Disp: 90 tablet, Rfl: 0  •  lisinopril (PRINIVIL,ZESTRIL) 20 mg tablet, Take 0.5 tablets (10 mg total) by mouth daily., Disp: , Rfl:   •  sertraline (ZOLOFT) 100 mg tablet, TAKE 1 TABLET(100 MG) BY MOUTH DAILY, Disp: 90 tablet, Rfl: 0    Reviewed and updated with patient active problem list, medication list, allergies.     Review of Systems   Constitutional: Positive for fatigue. Negative for chills and fever.   HENT: Positive for rhinorrhea and sneezing. Negative for sinus pressure and sinus pain.    Respiratory: Positive for cough (Dry, non-productive cough; worse in the morning). Negative for shortness of breath and wheezing.    Cardiovascular:        \"Chest heaviness\"--\"like something is there but nothing is there\"   Gastrointestinal: Negative.    Musculoskeletal: Positive for arthralgias (Left shoulder pain).   Neurological: Positive for headaches (More frequent than previously; daily for last week).   Psychiatric/Behavioral: Negative for sleep disturbance.       Objective     Vital Signs  /78 (BP Location: Right arm, Patient Position: Sitting, Cuff Size: " Reg)   Pulse 77   Temp 37.3 °C (99.2 °F) (Temporal)   Resp 16   Ht 1.524 m (5')   Wt 77.6 kg (171 lb)   SpO2 96%   BMI 33.40 kg/m²     No visits with results within 1 Month(s) from this visit.   Latest known visit with results is:   Office Visit on 10/29/2018   Component Date Value Ref Range Status   • Strep A Screen 10/29/2018 Negative  Negative Final   • Throat Culture 10/29/2018 No pathogenic beta hemolytic Streptococcus isolated   Final       Physical Exam   Constitutional: She is oriented to person, place, and time. She appears well-developed.   HENT:   Head: Normocephalic and atraumatic.   Right Ear: External ear normal.   Left Ear: External ear normal.   Nose: Nose normal. No mucosal edema or rhinorrhea. Right sinus exhibits no maxillary sinus tenderness and no frontal sinus tenderness. Left sinus exhibits no maxillary sinus tenderness and no frontal sinus tenderness.   Mouth/Throat: Oropharynx is clear and moist.   Neck: Normal range of motion. Neck supple.   Cardiovascular: Normal rate, regular rhythm and normal heart sounds. Exam reveals no gallop and no friction rub.   No murmur heard.  Pulmonary/Chest: Effort normal and breath sounds normal. No stridor. No respiratory distress. She has no wheezes. She has no rales.   Musculoskeletal: Normal range of motion. She exhibits no edema or deformity.   Lymphadenopathy:     She has no cervical adenopathy.   Neurological: She is alert and oriented to person, place, and time.   Skin: Skin is warm and dry. Capillary refill takes less than 2 seconds.   Vitals reviewed.      Assessment/Plan     Diagnoses and all orders for this visit:    Cough  Due to her benign physical examination findings as well as duration of the cough, I would like to proceed with obtaining a two-view chest x-ray.  If that is negative, would like to trial her off of the lisinopril to see if that improves her cough.  We may also need to get her an albuterol inhaler that she can use every  3-4 hours as needed for coughing.  She will be notified of her chest x-ray results when they become available.  She is in agreement to this plan of care and wishes to proceed as above.  -     X-ray chest 2 views; Future      Portions of this record may have been created with voice recognition software. Care has been taken to prevent errors, however occassional wrong-word, sound-a-like substitutions or grammatical errors may have occurred due to the inherent limitations of voice recognition software.

## 2019-01-31 ENCOUNTER — TELEPHONE (OUTPATIENT)
Dept: AUDIOLOGY | Facility: CLINIC | Age: 44
End: 2019-01-31

## 2019-01-31 NOTE — TELEPHONE ENCOUNTER
"Tamiko Tiwari had dropped her hearing aids off on 1/22/19 to have them cleaned and checked.  She stated that they were not functioning consistently, it was \"sporadic\" in nature.    Hearing Aid Make/Model/Serial#:              R:         Leo            Halo 2 i2400    [id number]              L:         Leo            Halo 2 i2400    [id number]    [employer] receivers: [id number]    Upon inspection, there was no noticeable issues with receivers.  I proceeded to clean molds, brushed microphones to remove excess debris, replaced wax traps and batteries.  Left hearing aids on, checked them 3 times during the course of the afternoon. I was unable to duplicate the intermittent function that patient had described.   I was finally able to reach patient today, advised her that her warranty for repair is valid until 5/18/19.  We discussed her options, and decided it would be best to send them in to the  to be gone through.  Call patient as soon as hearing aids come in.    ** Patient's case is located with the \"repairs to be picked up\".    Carrie Esquivel     "

## 2019-02-06 ENCOUNTER — TELEPHONE (OUTPATIENT)
Dept: AUDIOLOGY | Facility: CLINIC | Age: 44
End: 2019-02-06

## 2019-02-06 NOTE — TELEPHONE ENCOUNTER
Tamiko Craneleeann hearing aid returned from manufacture repair under warranty.    Hearing Aid Make/Model/Serial#:              R:         Leo            Halo 2 i2400    [id number]              L:         Leo            Halo 2 i2400    [id number]     [employer] receivers: [id number]    Manufacture indicated that both hearing aids and absolute receivers had been thoroughly cleaned and checked.  advised that the hearing aids were connected to the software and the most recent setting were reinstalled to the aid(s).  They did advise however, that the hearing aids would need to be re-paired with any iOS devices patient has.    Tamiko Tiwari does not need to see the provider for further programming changes.     Carrie Esquivel

## 2019-02-21 ENCOUNTER — OFFICE VISIT (OUTPATIENT)
Dept: URGENT CARE | Facility: URGENT CARE | Age: 44
End: 2019-02-21
Payer: COMMERCIAL

## 2019-02-21 VITALS
WEIGHT: 173 LBS | BODY MASS INDEX: 33.96 KG/M2 | TEMPERATURE: 98.1 F | SYSTOLIC BLOOD PRESSURE: 150 MMHG | DIASTOLIC BLOOD PRESSURE: 94 MMHG | OXYGEN SATURATION: 95 % | HEART RATE: 90 BPM | HEIGHT: 60 IN | RESPIRATION RATE: 18 BRPM

## 2019-02-21 DIAGNOSIS — J01.90 ACUTE BACTERIAL SINUSITIS: Primary | ICD-10-CM

## 2019-02-21 DIAGNOSIS — B96.89 ACUTE BACTERIAL SINUSITIS: Primary | ICD-10-CM

## 2019-02-21 PROCEDURE — 99213 OFFICE O/P EST LOW 20 MIN: CPT | Performed by: NURSE PRACTITIONER

## 2019-02-21 RX ORDER — AMOXICILLIN AND CLAVULANATE POTASSIUM 875; 125 MG/1; MG/1
1 TABLET, FILM COATED ORAL 2 TIMES DAILY
Qty: 14 TABLET | Refills: 0 | Status: SHIPPED | OUTPATIENT
Start: 2019-02-21 | End: 2019-08-09 | Stop reason: WASHOUT

## 2019-02-21 ASSESSMENT — PAIN SCALES - GENERAL: PAINLEVEL: 4

## 2019-02-22 NOTE — PROGRESS NOTES
Subjective     Tamiko Tiwari is a 43 y.o. female who presents for acute bacterial sinusitis     HPI  Patient presenting for acute sinus pain and pressure for the past two months.  Patient has purulent nasal discharge and frontal pressure/headache.  No vision changes or balance problems.  Patient is tolerating oral intake without issue.  No GI problems    The following have been reviewed and updated as appropriate in this visit:  No Known Allergies  Current Outpatient Medications   Medication Sig Dispense Refill   • levothyroxine (SYNTHROID, LEVOTHROID) 150 mcg tablet TAKE 1 TABLET(150 MCG) BY MOUTH DAILY 90 tablet 0   • sertraline (ZOLOFT) 100 mg tablet TAKE 1 TABLET(100 MG) BY MOUTH DAILY 90 tablet 0   • albuterol HFA (PROVENTIL HFA;VENTOLIN HFA) 90 mcg/actuation inhaler Inhale 2 puffs every 2 (two) hours as needed for wheezing (every 2-3 hours as needed for cough/wheezing) (Patient not taking: Reported on 2019 ) 1 Inhaler 1   • amoxicillin-pot clavulanate (AUGMENTIN) 875-125 mg per tablet Take 1 tablet by mouth 2 (two) times a day for 7 days 14 tablet 0   • lisinopril (PRINIVIL,ZESTRIL) 20 mg tablet Take 0.5 tablets (10 mg total) by mouth daily. (Patient not taking: Reported on 2019 )       No current facility-administered medications for this visit.      Past Medical History:   Diagnosis Date   • Depression    • Hypertension    • Hypothyroid      Past Surgical History:   Procedure Laterality Date   •  SECTION           Review of Systems    Review of Systems   Constitutional: Positive for chills and fever.   HENT: Positive for ear pain and sinus pressure.    Eyes: Negative for discharge.   Respiratory: Negative for shortness of breath.    Gastrointestinal: Negative for abdominal pain.     Objective   /94 (BP Location: Left arm, Patient Position: Sitting, Cuff Size: Reg) Comment (Cuff Size): jarvis  Pulse 90   Temp 36.7 °C (98.1 °F) (Temporal)   Resp 18   Ht 1.524 m (5')   Wt 78.5  kg (173 lb)   SpO2 95%   BMI 33.79 kg/m²     Physical Exam  Constitutional: She appears well-developed.   HENT:   Right Ear: Tympanic membrane normal.   Left Ear: Tympanic membrane normal.   Nose: Mucosal edema, rhinorrhea and sinus tenderness present.   Eyes: Conjunctivae are normal. Pupils are equal, round, and reactive to light.   Cardiovascular: Normal rate, regular rhythm and normal heart sounds.    Pulmonary/Chest: Effort normal and breath sounds normal.         Assessment/Plan   Diagnoses and all orders for this visit:    Acute bacterial sinusitis  -     amoxicillin-pot clavulanate (AUGMENTIN) 875-125 mg per tablet; Take 1 tablet by mouth 2 (two) times a day for 7 days        Jennifer G Franke, CNP     Clinical history and signs/symptoms are suggestive of an acute bacterial infection.  Take antibiotic as prescribed. Use over the counter tylenol or ibuprofen for fever and discomfort.  Sudafed during the day, benedryl at nighttime unless contraindicated.  Educated patient regarding potential blood pressure elevation with Sudafed.  May use saline rinses for congestion, hot steaming showers, and humidifier at nighttime.  Return to urgent care should you develop shortness of breath, chest pain with cough, inability to tolerate fluids, or worseing symptoms or concerns.  Patient agrees with plan,  verbalized understanding, and denies any further questions or concerns at this time.

## 2019-02-27 ENCOUNTER — TELEPHONE (OUTPATIENT)
Dept: FAMILY MEDICINE | Facility: CLINIC | Age: 44
End: 2019-02-27

## 2019-02-27 DIAGNOSIS — J01.90 ACUTE BACTERIAL SINUSITIS: Primary | ICD-10-CM

## 2019-02-27 DIAGNOSIS — B96.89 ACUTE BACTERIAL SINUSITIS: Primary | ICD-10-CM

## 2019-02-27 NOTE — TELEPHONE ENCOUNTER
Caller would like to discuss (a) appointment Writer has advised caller of a callback from within 24 hours.    Patient: Tamiko Tiwari    Caller Name (Last and first, relation/role): self    Name of Facility: [facility]    Callback Number: [phone]    Best Availability: anytime    Fax Number: na    Additional Info: patient is wanting to get in to see Sahara Diaz today. Went to Urgent care last week, on antibiotic and allergy meds and not working.  Sinus related.     Did you confirm the message with the caller: Yes    Is it okay that the nurse communicates your response through [facility]? No

## 2019-03-04 ENCOUNTER — DOCUMENTATION (OUTPATIENT)
Dept: AUDIOLOGY | Facility: CLINIC | Age: 44
End: 2019-03-04

## 2019-03-04 NOTE — PROGRESS NOTES
Subjective:  Tamiko Tiwari stopped in the clinic requesting walk in services to get her left hearing aid to function. States that she has changed the battery twice, but hearing aid is still not responding.    Hearing Aid Make/Model/Serial#:              R:         Leo            Halo 2 i2400    [id number]              L:         Leo            Halo 2 i2400    [id number]     #2 receivers  Custom ear molds:   [id number]    Objective/Assessment:  The left hearing aid was cleaned; microphones brushed to remove debris, wax trap and battery replaced. Listening check indicated the hearing aid was at full function. Patient opted not to have right hearing aid cleaned at this time (on limited time schedule).  I did give a quick re-instruction on cleaning and maintenance.    Plan:  Tamiko Tiwari will return to clinic on an as needed basis..     Carrie Esquivel

## 2019-03-25 DIAGNOSIS — I10 ESSENTIAL (PRIMARY) HYPERTENSION: ICD-10-CM

## 2019-03-25 DIAGNOSIS — E03.9 ACQUIRED HYPOTHYROIDISM: ICD-10-CM

## 2019-03-25 DIAGNOSIS — G47.00 INSOMNIA, UNSPECIFIED TYPE: ICD-10-CM

## 2019-03-26 RX ORDER — SERTRALINE HYDROCHLORIDE 100 MG/1
TABLET, FILM COATED ORAL
Qty: 90 TABLET | Refills: 0 | Status: SHIPPED | OUTPATIENT
Start: 2019-03-26 | End: 2019-08-09 | Stop reason: SDUPTHER

## 2019-03-26 RX ORDER — LISINOPRIL 20 MG/1
TABLET ORAL
Qty: 90 TABLET | Refills: 0 | Status: SHIPPED | OUTPATIENT
Start: 2019-03-26 | End: 2019-08-09 | Stop reason: ALTCHOICE

## 2019-03-26 RX ORDER — LEVOTHYROXINE SODIUM 150 UG/1
TABLET ORAL
Qty: 90 TABLET | Refills: 0 | Status: SHIPPED | OUTPATIENT
Start: 2019-03-26 | End: 2019-08-09 | Stop reason: SDUPTHER

## 2019-06-21 DIAGNOSIS — E03.9 ACQUIRED HYPOTHYROIDISM: ICD-10-CM

## 2019-06-21 DIAGNOSIS — G47.00 INSOMNIA, UNSPECIFIED TYPE: ICD-10-CM

## 2019-06-21 DIAGNOSIS — I10 ESSENTIAL (PRIMARY) HYPERTENSION: ICD-10-CM

## 2019-06-21 RX ORDER — LEVOTHYROXINE SODIUM 150 UG/1
TABLET ORAL
Qty: 90 TABLET | Refills: 0 | OUTPATIENT
Start: 2019-06-21

## 2019-06-21 RX ORDER — LISINOPRIL 20 MG/1
TABLET ORAL
Qty: 90 TABLET | Refills: 0 | OUTPATIENT
Start: 2019-06-21

## 2019-06-21 RX ORDER — SERTRALINE HYDROCHLORIDE 100 MG/1
TABLET, FILM COATED ORAL
Qty: 90 TABLET | Refills: 0 | OUTPATIENT
Start: 2019-06-21

## 2019-08-09 ENCOUNTER — OFFICE VISIT (OUTPATIENT)
Dept: INTERNAL MEDICINE | Facility: CLINIC | Age: 44
End: 2019-08-09
Payer: COMMERCIAL

## 2019-08-09 ENCOUNTER — APPOINTMENT (OUTPATIENT)
Dept: LAB | Facility: CLINIC | Age: 44
End: 2019-08-09
Payer: COMMERCIAL

## 2019-08-09 VITALS
BODY MASS INDEX: 28 KG/M2 | HEIGHT: 67 IN | DIASTOLIC BLOOD PRESSURE: 84 MMHG | OXYGEN SATURATION: 97 % | WEIGHT: 178.4 LBS | RESPIRATION RATE: 16 BRPM | TEMPERATURE: 98.2 F | SYSTOLIC BLOOD PRESSURE: 124 MMHG | HEART RATE: 66 BPM

## 2019-08-09 DIAGNOSIS — Z13.220 SCREENING FOR HYPERLIPIDEMIA: ICD-10-CM

## 2019-08-09 DIAGNOSIS — G47.00 INSOMNIA, UNSPECIFIED TYPE: ICD-10-CM

## 2019-08-09 DIAGNOSIS — N64.89 BREAST ASYMMETRY: ICD-10-CM

## 2019-08-09 DIAGNOSIS — I10 ESSENTIAL HYPERTENSION: ICD-10-CM

## 2019-08-09 DIAGNOSIS — E03.9 ACQUIRED HYPOTHYROIDISM: ICD-10-CM

## 2019-08-09 DIAGNOSIS — Z13.1 SCREENING FOR DIABETES MELLITUS: ICD-10-CM

## 2019-08-09 DIAGNOSIS — Z00.00 ENCOUNTER FOR ROUTINE ADULT HEALTH EXAMINATION WITHOUT ABNORMAL FINDINGS: Primary | ICD-10-CM

## 2019-08-09 DIAGNOSIS — R53.83 FATIGUE, UNSPECIFIED TYPE: ICD-10-CM

## 2019-08-09 DIAGNOSIS — Z23 NEED FOR SHINGLES VACCINE: ICD-10-CM

## 2019-08-09 LAB
ALBUMIN SERPL-MCNC: 4.1 G/DL (ref 3.5–5.3)
ALP SERPL-CCNC: 95 U/L (ref 37–98)
ALT SERPL-CCNC: 12 U/L (ref 0–52)
ANION GAP SERPL CALC-SCNC: 7 MMOL/L (ref 3–11)
ANISOCYTOSIS PRESENCE IN BLOOD, ANALYZER: ABNORMAL
AST SERPL-CCNC: 13 U/L (ref 0–39)
BASOPHILS # BLD AUTO: 0.1 10*3/UL
BASOPHILS NFR BLD AUTO: 1 % (ref 0–2)
BILIRUB SERPL-MCNC: 0.43 MG/DL (ref 0–1.4)
BUN SERPL-MCNC: 12 MG/DL (ref 7–25)
CALCIUM ALBUM COR SERPL-MCNC: 9.2 MG/DL (ref 8.6–10.3)
CALCIUM SERPL-MCNC: 9.3 MG/DL (ref 8.6–10.3)
CHLORIDE SERPL-SCNC: 106 MMOL/L (ref 98–107)
CHOLEST SERPL-MCNC: 235 MG/DL (ref 0–199)
CO2 SERPL-SCNC: 24 MMOL/L (ref 21–32)
CREAT SERPL-MCNC: 0.68 MG/DL (ref 0.6–1.2)
EOSINOPHIL # BLD AUTO: 0.4 10*3/UL
EOSINOPHIL NFR BLD AUTO: 7 % (ref 0–3)
ERYTHROCYTE [DISTWIDTH] IN BLOOD BY AUTOMATED COUNT: 17.2 % (ref 11.5–14)
FASTING STATUS PATIENT QL REPORTED: YES
GFR SERPL CREATININE-BSD FRML MDRD: 106 ML/MIN/1.73M*2
GLUCOSE SERPL-MCNC: 93 MG/DL (ref 70–105)
HCT VFR BLD AUTO: 35.5 % (ref 34–45)
HDLC SERPL-MCNC: 58 MG/DL
HGB BLD-MCNC: 11.6 G/DL (ref 11.5–15.5)
HYPOCHROMIA PRESENCE IN BLOOD, ANALYZER: ABNORMAL
LDLC SERPL CALC-MCNC: 154 MG/DL (ref 20–99)
LYMPHOCYTES # BLD AUTO: 2.3 10*3/UL
LYMPHOCYTES NFR BLD AUTO: 35 % (ref 11–47)
MCH RBC QN AUTO: 26.3 PG (ref 28–33)
MCHC RBC AUTO-ENTMCNC: 32.6 G/DL (ref 32–36)
MCV RBC AUTO: 80.9 FL (ref 81–97)
MONOCYTES # BLD AUTO: 0.5 10*3/UL
MONOCYTES NFR BLD AUTO: 8 % (ref 3–11)
NEUTROPHILS # BLD AUTO: 3.2 10*3/UL
NEUTROPHILS NFR BLD AUTO: 49 % (ref 41–81)
PLATELET # BLD AUTO: 296 10*3/UL (ref 140–350)
PMV BLD AUTO: 8.5 FL (ref 6.9–10.8)
POTASSIUM SERPL-SCNC: 4 MMOL/L (ref 3.5–5.1)
PROT SERPL-MCNC: 7.1 G/DL (ref 6–8.3)
RBC # BLD AUTO: 4.39 10*6/ΜL (ref 3.7–5.3)
SODIUM SERPL-SCNC: 137 MMOL/L (ref 135–145)
TRIGL SERPL-MCNC: 116 MG/DL
TSH SERPL DL<=0.05 MIU/L-ACNC: 2.3 UIU/ML (ref 0.34–4.82)
WBC # BLD AUTO: 6.5 10*3/UL (ref 4.5–10.5)

## 2019-08-09 PROCEDURE — 84443 ASSAY THYROID STIM HORMONE: CPT | Performed by: NURSE PRACTITIONER

## 2019-08-09 PROCEDURE — 99396 PREV VISIT EST AGE 40-64: CPT | Performed by: NURSE PRACTITIONER

## 2019-08-09 PROCEDURE — 80061 LIPID PANEL: CPT | Performed by: NURSE PRACTITIONER

## 2019-08-09 PROCEDURE — 85025 COMPLETE CBC W/AUTO DIFF WBC: CPT | Performed by: NURSE PRACTITIONER

## 2019-08-09 PROCEDURE — 80053 COMPREHEN METABOLIC PANEL: CPT | Performed by: NURSE PRACTITIONER

## 2019-08-09 PROCEDURE — 99213 OFFICE O/P EST LOW 20 MIN: CPT | Mod: 25 | Performed by: NURSE PRACTITIONER

## 2019-08-09 PROCEDURE — 36415 COLL VENOUS BLD VENIPUNCTURE: CPT | Performed by: NURSE PRACTITIONER

## 2019-08-09 RX ORDER — SERTRALINE HYDROCHLORIDE 100 MG/1
100 TABLET, FILM COATED ORAL DAILY
Qty: 90 TABLET | Refills: 3 | Status: SHIPPED | OUTPATIENT
Start: 2019-08-09 | End: 2020-09-18

## 2019-08-09 RX ORDER — LEVOTHYROXINE SODIUM 150 UG/1
150 TABLET ORAL DAILY
Qty: 90 TABLET | Refills: 3 | Status: SHIPPED | OUTPATIENT
Start: 2019-08-09 | End: 2020-09-03

## 2019-08-09 RX ORDER — SPIRONOLACTONE 25 MG/1
25 TABLET ORAL DAILY
Qty: 30 TABLET | Refills: 0 | Status: SHIPPED | OUTPATIENT
Start: 2019-08-09 | End: 2019-09-06 | Stop reason: SDUPTHER

## 2019-08-09 ASSESSMENT — ENCOUNTER SYMPTOMS
BRUISES/BLEEDS EASILY: 0
MUSCULOSKELETAL NEGATIVE: 1
FATIGUE: 1
NEUROLOGICAL NEGATIVE: 1
CHILLS: 0
FEVER: 0
GASTROINTESTINAL NEGATIVE: 1
POLYDIPSIA: 0
SHORTNESS OF BREATH: 0
CARDIOVASCULAR NEGATIVE: 1
POLYPHAGIA: 0
SLEEP DISTURBANCE: 1
COUGH: 1

## 2019-08-09 ASSESSMENT — PAIN SCALES - GENERAL: PAINLEVEL: 0-NO PAIN

## 2019-08-09 NOTE — PATIENT INSTRUCTIONS
We are transitioning you from the lisinopril to spironolactone (water pill) to help keep blood pressure down.      Try some melatonin before bed.  1-3 is usually enough to give results.     Will provide printed prescription for the new shingles vaccine.

## 2019-08-09 NOTE — PROGRESS NOTES
Tamiko Tiwari is a 44 y.o. female who presents to the clinic today for annual physical exam.  Labs were not drawn prior to today's appointment.  We will place orders for fasting labs after this appointment.      Annual Wellness Visit Criteria as reported by patient .     Subjective Data    History of Present Illness   Tamiko is a very pleasant 44-year-old female patient that I am seen today for annual physical.  She denies any changes to her past medical history, social history or surgical history.  She is due for a follow-up diagnostic mammogram per her previous imaging recommendations.  Last Pap smear was done in June 2018 which included cotesting.  She is not high risk for colon cancer screening, therefore we are deferring until age 49.  She continues to not smoke.  She is up-to-date on her vaccines, aside from needing the Shingrix vaccine.  Her only concern is that she has restarted taking her lisinopril due to some increased headaches and if she again is experiencing the cough related to the lisinopril.  She is wondering if we can transition her to a different antihypertensive medication.      Social History     Socioeconomic History   • Marital status:      Spouse name: Carl   • Number of children: 1   • Years of education: Not on file   • Highest education level: Not on file   Occupational History   • Occupation: Ambulatory Director     Employer: [employer]   Social Needs   • Financial resource strain: Not on file   • Food insecurity:     Worry: Not on file     Inability: Not on file   • Transportation needs:     Medical: Not on file     Non-medical: Not on file   Tobacco Use   • Smoking status: Never Smoker   • Smokeless tobacco: Never Used   Substance and Sexual Activity   • Alcohol use: Yes     Alcohol/week: 1.2 oz     Types: 1 Glasses of wine, 1 Cans of beer per week   • Drug use: No   • Sexual activity: Yes     Birth control/protection: None   Lifestyle   • Physical activity:     Days per  week: Not on file     Minutes per session: Not on file   • Stress: Not on file   Relationships   • Social connections:     Talks on phone: Not on file     Gets together: Not on file     Attends Taoist service: Not on file     Active member of club or organization: Not on file     Attends meetings of clubs or organizations: Not on file     Relationship status: Not on file   • Intimate partner violence:     Fear of current or ex partner: Not on file     Emotionally abused: Not on file     Physically abused: Not on file     Forced sexual activity: Not on file   Other Topics Concern   • Not on file   Social History Narrative   • Not on file       Past Surgical History:   Procedure Laterality Date   •  SECTION         Past Medical History:   Diagnosis Date   • Depression    • Hypertension    • Hypothyroid          Current Outpatient Medications:   •  sertraline (ZOLOFT) 100 mg tablet, TAKE 1 TABLET(100 MG) BY MOUTH DAILY, Disp: 90 tablet, Rfl: 0  •  levothyroxine (SYNTHROID, LEVOTHROID) 150 mcg tablet, TAKE 1 TABLET(150 MCG) BY MOUTH DAILY, Disp: 90 tablet, Rfl: 0  •  spironolactone (ALDACTONE) 25 mg tablet, Take 1 tablet (25 mg total) by mouth daily, Disp: 30 tablet, Rfl: 0  •  varicella-zoster, vial 1 of 2, (SHINGRIX) 50 mcg/0.5 mL suspension for reconstitution vaccine, Inject 0.5 mL into the shoulder, thigh, or buttocks once for 1 dose, Disp: 1 vial, Rfl: 1  •  albuterol HFA (PROVENTIL HFA;VENTOLIN HFA) 90 mcg/actuation inhaler, Inhale 2 puffs every 2 (two) hours as needed for wheezing (every 2-3 hours as needed for cough/wheezing) (Patient not taking: Reported on 2019 ), Disp: 1 Inhaler, Rfl: 1    Preventative Healthcare Guidelines/Recommendations  Breast Cancer Screening   Last mammogram completed 2019              Results: Did require additional views and breast US--recommended f/u US in 6 months due to asymmetry that was found in right breast below nipple        Recommend repeat  "screening mammogram in 1 year: []     Not applicable: []      Pelvic Exam (including clinical breast exam)    Patient is male []     Last Pelvic/Pap: June 2018--negative with negative HPV testing   Pap smear needed: No   Pelvic performed:    Today []  Recently had performed at another provider's office []  Refuses [x]     Colon Cancer Screening   Last colonoscopy: Never completed screening colonoscopy.   Polyps: N/A   Fecal Occult Blood Test: []     Cologuard: []      Refuses []      Smoking Cessation:    N/A   Screening for Lung Cancer with Low-Dose Ct: Yes []  No []  N/A [x]        Prostate Cancer Screening   Patient is female [x]     PSA needed: N/A   Most recent PSA:    JERI: Up to date []  Not up to date  []  Will update today []  Refuses []      Utilizing aspirin therapy?    Yes []     No [x]     Recommend []     Declines  []       Vaccines  Influenza   Not indicated [x]  Not in flu season at this time.   Up to date: []     Not up to date  []    Will update today []    Refuses []      Prevnar    Not indicated [x]    Up to date: []    Not up to date  []    Will update today []    Refuses []       Pneumovax    Not indicated [x]   Up to date []    Not up to date  []    Will update today []    Refuses []      Shingles:   Not indicated []    Up to date [x]  Has received the Zostavax  Not up to date  []    Will update today []    Refuses []     Tdap:  Not indicated []    Up to date [x]    Not up to date  []    Will update today []    Refuses []     Abdominal Aortic Aneurysm Screening    Limited to:   Men who are 65-75 years old  []     Not applicable [x]          Blood Pressure Monitoring   Today's blood pressure: /84 (BP Location: Right arm, Patient Position: Sitting, Cuff Size: Reg)   Pulse 66   Temp 36.8 °C (98.2 °F) (Temporal)   Resp 16   Ht 1.689 m (5' 6.5\")   Wt 80.9 kg (178 lb 6.4 oz)   LMP 08/06/2019   SpO2 97%   BMI 28.36 kg/m²      Diabetes Screening   History of Diabetes? No   Most Recent " "Fasting Glucose:   Lab Results   Component Value Date    GLUF 83 06/07/2018      Most recent A1C: No results found for: HGBA1C    Cardiovascular Screening      Lab Results   Component Value Date    TRIG 72 06/07/2018    TRIG 138 05/31/2018        Lab Results   Component Value Date    CHOL 178 06/07/2018    CHOL 216 (H) 05/31/2018        Lab Results   Component Value Date    HDL 49 06/07/2018    HDL 49 05/31/2018        Lab Results   Component Value Date    LDLCALC 115 (H) 06/07/2018    LDLCALC 139 (H) 05/31/2018       HIV Testing:  No    Hepatitis C Screening:   No    Bone Mass Measurement  Patient has not had screening for decreased bone mass.    Review of Systems  Review of Systems   Constitutional: Positive for fatigue. Negative for chills and fever.   HENT:        Seasonal allergies.    Respiratory: Positive for cough. Negative for shortness of breath.    Cardiovascular: Negative.    Gastrointestinal: Negative.    Endocrine: Negative for polydipsia and polyphagia.   Genitourinary: Negative.    Musculoskeletal: Negative.    Skin: Negative.    Neurological: Negative.    Hematological: Does not bruise/bleed easily.   Psychiatric/Behavioral: Positive for sleep disturbance (Trouble staying asleep).     Objective Data    /84 (BP Location: Right arm, Patient Position: Sitting, Cuff Size: Reg)   Pulse 66   Temp 36.8 °C (98.2 °F) (Temporal)   Resp 16   Ht 1.689 m (5' 6.5\")   Wt 80.9 kg (178 lb 6.4 oz)   LMP 08/06/2019   SpO2 97%   BMI 28.36 kg/m²      Physical Exam  Physical Exam   Constitutional: She is oriented to person, place, and time. She appears well-developed. No distress.   HENT:   Head: Normocephalic.   Right Ear: External ear normal.   Left Ear: External ear normal.   Mouth/Throat: Oropharynx is clear and moist. No oropharyngeal exudate.   Eyes: Pupils are equal, round, and reactive to light. EOM are normal.   Neck: Normal range of motion. No thyromegaly present.   Cardiovascular: Normal rate, " regular rhythm and normal heart sounds. Exam reveals no gallop and no friction rub.   No murmur heard.  Pulmonary/Chest: Effort normal and breath sounds normal. No stridor. No respiratory distress. She has no wheezes. She has no rales.   Breast exam deferred per patient.    Abdominal: Soft. Bowel sounds are normal. She exhibits no distension and no mass. There is no tenderness. There is no guarding.   Genitourinary:   Genitourinary Comments: Pelvic/rectal deferred per patient.    Musculoskeletal: Normal range of motion. She exhibits no edema or deformity.   Lymphadenopathy:     She has no cervical adenopathy.   Neurological: She is alert and oriented to person, place, and time.   Skin: Skin is warm and dry. Capillary refill takes less than 2 seconds. She is not diaphoretic.   Psychiatric: She has a normal mood and affect.   Vitals reviewed.      Assessment & Plan  Diagnoses and all orders for this visit:    Encounter for routine adult health examination without abnormal findings    Screening for hyperlipidemia  -     CBC w/auto differential Blood, Venous  -     Lipid panel Blood, Venous; Future    Screening for diabetes mellitus  -     Comprehensive metabolic panel Blood, Venous    Fatigue, unspecified type  -     Thyroid Stimulating Hormone, Ultrasensitive Blood, Venous; Future    Essential hypertension  -     spironolactone (ALDACTONE) 25 mg tablet; Take 1 tablet (25 mg total) by mouth daily    Need for shingles vaccine  -     varicella-zoster, vial 1 of 2, (SHINGRIX) 50 mcg/0.5 mL suspension for reconstitution vaccine; Inject 0.5 mL into the shoulder, thigh, or buttocks once for 1 dose    Insomnia, unspecified type  -     sertraline (ZOLOFT) 100 mg tablet; Take 1 tablet (100 mg total) by mouth daily    Breast asymmetry  -     BI diagnostic mammogram bilateral w lesley; Future    1.  Again, this is an annual physical for Tamiko.  She is due for repeat imaging on her breast due to breast asymmetry found on her  mammogram in January.  She is up-to-date on her Pap smear and is not yet a candidate for colonoscopy.  She declines pelvic, rectal and breast examination today.    2.  Screening for hyperlipidemia.  Patient is fasting today, so we will update a fasting lipid profile.    3.  Screening for diabetes.  We will obtain fasting glucose.    4.  Fatigue.  Patient does report that she has gained a little bit of weight due to her dietary habits.  I am also going to check a CBC.    5.  Essential hypertension.  Patient previously on 20 mg of lisinopril.  Unfortunately, as she is developing the cough, we will transition her to spironolactone.  We will follow-up in 4 weeks.    6.  Need for shingles vaccine.  We will provide prescription for shingles vaccine to be taken to an outpatient pharmacy.    7.  Insomnia.  We will have her try melatonin.    8.  Breast asymmetry.  Last mammogram was done in January which did require additional views and ultrasonography.  I will send order for updated mammogram per previous recommendations.    All medical conditions are stable at this time.  Recommend a 4-week follow-up to see how she is doing with the discontinuation of the lisinopril and transition to spironolactone.    45 minutes were spent face to face with patient with >50% of time spent in counseling, discussion, and coordination regarding preventative healthcare guidelines.    Portions of this record may have been created with voice recognition software. Care has been taken to prevent errors, however occassional wrong-word, sound-a-like substitutions or grammatical errors may have occurred due to the inherent limitations of voice recognition software.

## 2019-08-14 PROBLEM — H90.3 SENSORINEURAL HEARING LOSS (SNHL) OF BOTH EARS: Status: ACTIVE | Noted: 2019-08-14

## 2019-08-14 NOTE — PROGRESS NOTES
AUDIOLOGY FOLLOW-UP VISIT NOTE: HEARING AID REPAIR AND/OR ADJUSTMENT    SUBJECTIVE    Tamiko Tiwari is a 44 y.o. year old female who was seen for a hearing aid adjustment.     Tamiko Tiwari was not accompanied by a significant other or caregiver.        Hearing Aid Make/Model/Serial#:  (programmed with [email or web address])              R:        Leo            Halo 2 [id number]    [id number]              L:         Leo            Halo 2 [id number]    [id number]   Receivers: #2 receivers   Custom ear molds:   [id number]              Warranty Ends: 6/16/2019      Date of last Audiological Exam:  10/4/2017    Patient complaints are as follows:  • Would like the hearing aids turned up.   • Hearing aids squeal though when she turns them up too high.   • She got a new phone and needs to make sure her hearing aids are connected.         OBJECTIVE  Otoscopy:       Right: clear canal, tympanic membrane visible      Left: clear canal, tympanic membrane visible      Right and left hearing aids examined.     -Cleaned amplification with appropriate disinfectant.     -Listening check revealed hearing aids to be of good sound quality.      Adjustment:     -Calibrated feedback manager.     -Increased gain for frequency lowering (patient did not feel an improvement with frequency lowering turned off).     -Increased gain for soft inputs by 3 clicks.      -Increased gain for medium inputs 2 clicks.      -Increased gain for loud inputs by 1 click.      -Increased speech in noise to 4 in Normal and Crowd programs.     Patient reported increased comfort and satisfaction with sound quality following today's adjustments.         Counseling:     -Paired hearing aids with new iPhone.      -Realistic expectations discussed about increasing volume and feedback.             ASSESSMENT    -Hearing aid check, binaural.    -Type of hearing loss is sensorineural binaural.      PLAN    -Advised patient to schedule follow-up as needed for  routine hearing aid maintenance and/or further programming adjustments.      EDUCATION    -Patient counseled as listed above.    -Patient communicated understanding and actively participated in plan of care.      Sarah Epps, SAW

## 2019-08-16 ENCOUNTER — OFFICE VISIT NO LOS (OUTPATIENT)
Dept: AUDIOLOGY | Facility: CLINIC | Age: 44
End: 2019-08-16
Payer: COMMERCIAL

## 2019-08-16 DIAGNOSIS — H90.3 SENSORINEURAL HEARING LOSS (SNHL) OF BOTH EARS: Primary | ICD-10-CM

## 2019-08-16 PROCEDURE — 92593 PR HEARING AID CHECK, BOTH EARS: CPT | Mod: NC | Performed by: AUDIOLOGIST

## 2019-09-06 ENCOUNTER — OFFICE VISIT (OUTPATIENT)
Dept: INTERNAL MEDICINE | Facility: CLINIC | Age: 44
End: 2019-09-06
Payer: COMMERCIAL

## 2019-09-06 VITALS
RESPIRATION RATE: 14 BRPM | HEIGHT: 67 IN | BODY MASS INDEX: 28.47 KG/M2 | SYSTOLIC BLOOD PRESSURE: 124 MMHG | DIASTOLIC BLOOD PRESSURE: 80 MMHG | HEART RATE: 80 BPM | OXYGEN SATURATION: 96 % | TEMPERATURE: 98 F | WEIGHT: 181.4 LBS

## 2019-09-06 DIAGNOSIS — T46.4X5A COUGH DUE TO ACE INHIBITOR: Primary | ICD-10-CM

## 2019-09-06 DIAGNOSIS — R05.8 COUGH DUE TO ACE INHIBITOR: Primary | ICD-10-CM

## 2019-09-06 DIAGNOSIS — I10 ESSENTIAL HYPERTENSION: ICD-10-CM

## 2019-09-06 DIAGNOSIS — J30.2 SEASONAL ALLERGIES: ICD-10-CM

## 2019-09-06 PROCEDURE — 99213 OFFICE O/P EST LOW 20 MIN: CPT | Mod: 25 | Performed by: NURSE PRACTITIONER

## 2019-09-06 PROCEDURE — 96372 THER/PROPH/DIAG INJ SC/IM: CPT | Performed by: NURSE PRACTITIONER

## 2019-09-06 RX ORDER — TRIAMCINOLONE ACETONIDE 40 MG/ML
60 INJECTION, SUSPENSION INTRA-ARTICULAR; INTRAMUSCULAR ONCE
Status: COMPLETED | OUTPATIENT
Start: 2019-09-06 | End: 2019-09-06

## 2019-09-06 RX ORDER — SPIRONOLACTONE 25 MG/1
25 TABLET ORAL DAILY
Qty: 90 TABLET | Refills: 3 | Status: SHIPPED | OUTPATIENT
Start: 2019-09-06 | End: 2020-09-05 | Stop reason: WASHOUT

## 2019-09-06 RX ADMIN — TRIAMCINOLONE ACETONIDE 60 MG: 40 INJECTION, SUSPENSION INTRA-ARTICULAR; INTRAMUSCULAR at 09:28

## 2019-09-06 ASSESSMENT — ENCOUNTER SYMPTOMS
COUGH: 0
SORE THROAT: 0
CHILLS: 0
RHINORRHEA: 1
EYES NEGATIVE: 1
FEVER: 0
SINUS PRESSURE: 1
CARDIOVASCULAR NEGATIVE: 1

## 2019-09-06 ASSESSMENT — PAIN SCALES - GENERAL: PAINLEVEL: 0-NO PAIN

## 2019-09-06 NOTE — PROGRESS NOTES
Subjective      Tamiko is a very pleasant 44-year-old female patient that I am seen today for a 4-week follow-up visit in regards to some changes to her blood pressure medications.  I last saw her one month ago for her annual physical.  At that time, her only concern was that she had restarted taking her lisinopril due to some increased headaches and unfortunately, she again developed a cough.  We decided to transition her off of the lisinopril and start her on spironolactone.  She reports that overall, she feels as if she is adjusting well and her cough has completely resolved.  Her only concern today is some seasonal allergies which are rather bothersome for her.  She also reports that her sleep is interrupted due to her nasal congestion.    Medications    Current Outpatient Medications:   •  spironolactone (ALDACTONE) 25 mg tablet, Take 1 tablet (25 mg total) by mouth daily, Disp: 30 tablet, Rfl: 0  •  sertraline (ZOLOFT) 100 mg tablet, Take 1 tablet (100 mg total) by mouth daily, Disp: 90 tablet, Rfl: 3  •  levothyroxine (SYNTHROID, LEVOTHROID) 150 mcg tablet, Take 1 tablet (150 mcg total) by mouth daily, Disp: 90 tablet, Rfl: 3  •  albuterol HFA (PROVENTIL HFA;VENTOLIN HFA) 90 mcg/actuation inhaler, Inhale 2 puffs every 2 (two) hours as needed for wheezing (every 2-3 hours as needed for cough/wheezing), Disp: 1 Inhaler, Rfl: 1    Reviewed and updated with patient active problem list, medication list, allergies.     Review of Systems   Constitutional: Negative for chills and fever.   HENT: Positive for congestion, rhinorrhea and sinus pressure (Mild). Negative for ear pain and sore throat.    Eyes: Negative.    Respiratory: Negative for cough (Resolved).    Cardiovascular: Negative.    Genitourinary: Negative for decreased urine volume and urgency.       Objective     Vital Signs  /80 (BP Location: Left arm, Patient Position: Sitting, Cuff Size: Reg)   Pulse 80   Temp 36.7 °C (98 °F) (Temporal)   Resp  "14   Ht 1.689 m (5' 6.5\")   Wt 82.3 kg (181 lb 6.4 oz)   LMP 08/30/2019   SpO2 96%   BMI 28.84 kg/m²      Appointment on 08/09/2019   Component Date Value Ref Range Status   • TSH 08/09/2019 2.301  0.340 - 4.820 uIU/ml Final   • Triglycerides 08/09/2019 116  <=149 mg/dL Final   • Cholesterol 08/09/2019 235* 0 - 199 mg/dL Final   • HDL 08/09/2019 58  >=40 mg/dL Final   • LDL Calculated 08/09/2019 154* 20 - 99 mg/dL Final   • Fasting? 08/09/2019 Yes   Final   Office Visit on 08/09/2019   Component Date Value Ref Range Status   • WBC 08/09/2019 6.5  4.5 - 10.5 10*3/uL Final   • RBC 08/09/2019 4.39  3.70 - 5.30 10*6/µL Final   • Hemoglobin 08/09/2019 11.6  11.5 - 15.5 g/dL Final   • Hematocrit 08/09/2019 35.5  34.0 - 45.0 % Final   • MCV 08/09/2019 80.9* 81.0 - 97.0 fL Final   • MCH 08/09/2019 26.3* 28.0 - 33.0 pg Final   • MCHC 08/09/2019 32.6  32.0 - 36.0 g/dL Final   • RDW 08/09/2019 17.2* 11.5 - 14.0 % Final   • Platelets 08/09/2019 296  140 - 350 10*3/uL Final   • MPV 08/09/2019 8.5  6.9 - 10.8 fL Final   • Neutrophils% 08/09/2019 49  41 - 81 % Final   • Lymphocytes% 08/09/2019 35  11 - 47 % Final   • Monocytes% 08/09/2019 8  3 - 11 % Final   • Eosinophils% 08/09/2019 7* 0 - 3 % Final   • Basophils% 08/09/2019 1  0 - 2 % Final   • Neutrophils Absolute 08/09/2019 3.20  10*3/uL Final   • Lymphocytes Absolute 08/09/2019 2.30  10*3/uL Final   • Monocytes Absolute 08/09/2019 0.50  10*3/uL Final   • Eosinophils Absolute 08/09/2019 0.40  10*3/uL Final   • Basophils Absolute 08/09/2019 0.10  10*3/uL Final   • Anisocytosis 08/09/2019 1+   Final   • Hypochromia 08/09/2019 1+   Final   • Sodium 08/09/2019 137  135 - 145 mmol/L Final   • Potassium 08/09/2019 4.0  3.5 - 5.1 mmol/L Final   • Chloride 08/09/2019 106  98 - 107 mmol/L Final   • CO2 08/09/2019 24  21 - 32 mmol/L Final   • Anion Gap 08/09/2019 7  3 - 11 mmol/L Final   • BUN 08/09/2019 12  7 - 25 mg/dL Final   • Creatinine 08/09/2019 0.68  0.60 - 1.20 mg/dL Final "   • Glucose 08/09/2019 93  70 - 105 mg/dL Final   • Calcium 08/09/2019 9.3  8.6 - 10.3 mg/dL Final   • AST 08/09/2019 13  0 - 39 U/L Final   • ALT (SGPT) 08/09/2019 12  0 - 52 U/L Final   • Alkaline Phosphatase 08/09/2019 95  37 - 98 U/L Final   • Total Protein 08/09/2019 7.1  6.0 - 8.3 g/dL Final   • Albumin 08/09/2019 4.1  3.5 - 5.3 g/dL Final   • Total Bilirubin 08/09/2019 0.43  0.00 - 1.40 mg/dL Final   • eGFR 08/09/2019 106  >60 mL/min/1.73m*2 Final   • Corrected Calcium 08/09/2019 9.2  8.6 - 10.3 mg/dL Final       Physical Exam  Constitutional:       Appearance: Normal appearance.   HENT:      Head: Normocephalic.      Right Ear: Tympanic membrane, ear canal and external ear normal. There is no impacted cerumen.      Left Ear: Tympanic membrane, ear canal and external ear normal. There is no impacted cerumen.      Nose: Mucosal edema, congestion and rhinorrhea present.      Mouth/Throat:      Mouth: Mucous membranes are moist.      Pharynx: No oropharyngeal exudate or posterior oropharyngeal erythema.   Cardiovascular:      Rate and Rhythm: Normal rate and regular rhythm.      Heart sounds: Normal heart sounds. No murmur.   Pulmonary:      Effort: Pulmonary effort is normal.      Breath sounds: Normal breath sounds.   Abdominal:      General: Abdomen is flat. Bowel sounds are normal.      Palpations: Abdomen is soft.   Neurological:      Mental Status: She is alert.          Assessment/Plan     Diagnoses and all orders for this visit:    Cough due to ACE inhibitor    Essential hypertension  -     spironolactone (ALDACTONE) 25 mg tablet; Take 1 tablet (25 mg total) by mouth daily    Seasonal allergies  -     triamcinolone acetonide (KENALOG-40) 40 mg/mL injection 60 mg    1.  Cough due to ACE inhibitor.  Patient has transitioned off of lisinopril with complete resolution of her cough.    2.  Essential hypertension.  Blood pressure is at goal today on 25 mg of spironolactone.  Blood pressure today is 124/80.  We  will make no changes to her antihypertensive regimen at this time.    3.  Seasonal allergies.  Patient has been taking Zyrtec, but she is markedly congested today.  She is asking for a Kenalog injection.  We will administer 60 mg IM today.    All medical conditions are stable at this time.  We will update her annual physical last year.    Portions of this record may have been created with voice recognition software. Care has been taken to prevent errors, however occasional wrong-word, sound-a-like substitutions or grammatical errors may have occurred due to the inherent limitations of voice recognition software.

## 2019-12-31 ENCOUNTER — OFFICE VISIT (OUTPATIENT)
Dept: URGENT CARE | Facility: URGENT CARE | Age: 44
End: 2019-12-31
Payer: COMMERCIAL

## 2019-12-31 VITALS
OXYGEN SATURATION: 96 % | SYSTOLIC BLOOD PRESSURE: 128 MMHG | RESPIRATION RATE: 14 BRPM | TEMPERATURE: 98.9 F | HEIGHT: 67 IN | HEART RATE: 84 BPM | BODY MASS INDEX: 28.41 KG/M2 | WEIGHT: 181 LBS | DIASTOLIC BLOOD PRESSURE: 74 MMHG

## 2019-12-31 DIAGNOSIS — B96.89 ACUTE BACTERIAL SINUSITIS: Primary | ICD-10-CM

## 2019-12-31 DIAGNOSIS — J20.9 ACUTE BRONCHITIS, UNSPECIFIED ORGANISM: ICD-10-CM

## 2019-12-31 DIAGNOSIS — J01.90 ACUTE BACTERIAL SINUSITIS: Primary | ICD-10-CM

## 2019-12-31 PROCEDURE — 99213 OFFICE O/P EST LOW 20 MIN: CPT | Performed by: PHYSICIAN ASSISTANT

## 2019-12-31 RX ORDER — AMOXICILLIN AND CLAVULANATE POTASSIUM 875; 125 MG/1; MG/1
1 TABLET, FILM COATED ORAL 2 TIMES DAILY
Qty: 20 TABLET | Refills: 0 | Status: SHIPPED | OUTPATIENT
Start: 2019-12-31 | End: 2020-01-10

## 2019-12-31 ASSESSMENT — PAIN SCALES - GENERAL: PAINLEVEL: 0-NO PAIN

## 2019-12-31 NOTE — PROGRESS NOTES
"Subjective      HPI    Tamiko Tiwari is a 44 y.o. female who presents for postnasal drip, and cough occasionally productive of phlegm and is worse at night and first thing in the morning..  Symptoms started 2 weeks ago.  Pt has been using Mucus Relief with minimal relief.  No known fevers.  PT denies any facial or dental pain, tender swollen lymph nodes, wheezing, or shortness of breath.      Review of Systems    As noted in HPI.      Objective   /74 (BP Location: Left arm, Patient Position: Sitting, Cuff Size: Regular Adult)   Pulse 84   Temp 37.2 °C (98.9 °F) (Temporal)   Resp 14   Ht 1.689 m (5' 6.5\")   Wt 82.1 kg (181 lb)   SpO2 96%   BMI 28.78 kg/m²     Physical Exam  Vitals signs and nursing note reviewed.   Constitutional:       Appearance: She is well-developed.   HENT:      Head: Normocephalic and atraumatic.      Right Ear: Hearing, tympanic membrane, ear canal and external ear normal.      Left Ear: Hearing, tympanic membrane, ear canal and external ear normal.      Nose: Congestion present.      Mouth/Throat:      Pharynx: Uvula midline. Posterior oropharyngeal erythema ( postnasal drip ) present.   Eyes:      Conjunctiva/sclera: Conjunctivae normal.      Pupils: Pupils are equal, round, and reactive to light.   Neck:      Musculoskeletal: Normal range of motion and neck supple.   Cardiovascular:      Rate and Rhythm: Normal rate and regular rhythm.      Heart sounds: Normal heart sounds. No murmur. No friction rub. No gallop.    Pulmonary:      Effort: Pulmonary effort is normal.      Breath sounds: Normal breath sounds. No wheezing, rhonchi or rales.   Musculoskeletal: Normal range of motion.   Lymphadenopathy:      Cervical: No cervical adenopathy.   Skin:     General: Skin is warm and dry.   Neurological:      Mental Status: She is alert and oriented to person, place, and time.   Psychiatric:         Behavior: Behavior normal.         Thought Content: Thought content normal.         " Judgment: Judgment normal.         No results found for this or any previous visit (from the past 4 hour(s)).          Assessment/Plan   Diagnoses and all orders for this visit:    Acute bacterial sinusitis  -     amoxicillin-pot clavulanate (AUGMENTIN) 875-125 mg per tablet; Take 1 tablet by mouth 2 (two) times a day for 10 days    Acute bronchitis, unspecified organism  -     amoxicillin-pot clavulanate (AUGMENTIN) 875-125 mg per tablet; Take 1 tablet by mouth 2 (two) times a day for 10 days        Discussion:   Given symptoms and physical exam, I discussed with patient this is likely low-grade sinus infection with postnasal drip causing cough..  Would recommend she start Flonase.  Continue symptomatic cares including saline nasal rinse, hot steamy showers, chloraseptic lozenges, salt water gargle, tylenol and ibuprofen for fever/pain, mucolytics, antitussives, and decongestants.  Expected course of this diagnosis discussed with pt. Pt will f/u in clinic prn persisting or worsening symptoms.  Pt verbalizes understanding and agrees with plan.       BRYCE Crowder

## 2020-01-10 ENCOUNTER — OFFICE VISIT (OUTPATIENT)
Dept: INTERNAL MEDICINE | Facility: CLINIC | Age: 45
End: 2020-01-10
Payer: COMMERCIAL

## 2020-01-10 VITALS
WEIGHT: 182.4 LBS | BODY MASS INDEX: 28.63 KG/M2 | RESPIRATION RATE: 16 BRPM | HEART RATE: 80 BPM | DIASTOLIC BLOOD PRESSURE: 80 MMHG | HEIGHT: 67 IN | TEMPERATURE: 98.3 F | OXYGEN SATURATION: 98 % | SYSTOLIC BLOOD PRESSURE: 130 MMHG

## 2020-01-10 DIAGNOSIS — R05.9 COUGH: Primary | ICD-10-CM

## 2020-01-10 PROCEDURE — 99213 OFFICE O/P EST LOW 20 MIN: CPT | Performed by: NURSE PRACTITIONER

## 2020-01-10 RX ORDER — BENZONATATE 100 MG/1
100 CAPSULE ORAL 3 TIMES DAILY PRN
Qty: 20 CAPSULE | Refills: 0 | Status: SHIPPED | OUTPATIENT
Start: 2020-01-10 | End: 2020-01-17

## 2020-01-10 RX ORDER — METHYLPREDNISOLONE 4 MG/1
TABLET ORAL
Qty: 21 TABLET | Refills: 0 | Status: SHIPPED | OUTPATIENT
Start: 2020-01-10 | End: 2020-01-17

## 2020-01-10 ASSESSMENT — ENCOUNTER SYMPTOMS
SLEEP DISTURBANCE: 1
FEVER: 0
CHILLS: 0
SORE THROAT: 0
FATIGUE: 1
SINUS PAIN: 0
WHEEZING: 1
COUGH: 1
SINUS PRESSURE: 0

## 2020-01-10 ASSESSMENT — PAIN SCALES - GENERAL: PAINLEVEL: 0-NO PAIN

## 2020-01-10 NOTE — PROGRESS NOTES
Subjective     Tamiko is a very pleasant 44-year-old female patient who I am seeing today for evaluation of a cough that she has been experiencing for 4 weeks.  She denies any fever, but does report that when it she started feeling ill approximately on December 20, she had a rather productive cough.  Her cough was productive at that time of some thick, green/yellow sputum.  When she returned from the holidays, she presented to urgent care where she was treated with Augmentin for bronchitis and a low-grade sinus infection.  Unfortunately, she had no change in her symptoms.  She has been continuing to take Mucinex D, utilizing her inhaler and taking Robitussin at night.  She reports that her main concern is the cough that she has predominantly at night.  Because of this, she is fatigued.  She also notes that she hears herself wheezing, mostly at night.     Medications    Current Outpatient Medications:   •  spironolactone (ALDACTONE) 25 mg tablet, Take 1 tablet (25 mg total) by mouth daily, Disp: 90 tablet, Rfl: 3  •  sertraline (ZOLOFT) 100 mg tablet, Take 1 tablet (100 mg total) by mouth daily, Disp: 90 tablet, Rfl: 3  •  levothyroxine (SYNTHROID, LEVOTHROID) 150 mcg tablet, Take 1 tablet (150 mcg total) by mouth daily, Disp: 90 tablet, Rfl: 3  •  albuterol HFA (PROVENTIL HFA;VENTOLIN HFA) 90 mcg/actuation inhaler, Inhale 2 puffs every 2 (two) hours as needed for wheezing (every 2-3 hours as needed for cough/wheezing), Disp: 1 Inhaler, Rfl: 1  •  amoxicillin-pot clavulanate (AUGMENTIN) 875-125 mg per tablet, Take 1 tablet by mouth 2 (two) times a day for 10 days (Patient not taking: Reported on 1/10/2020 ), Disp: 20 tablet, Rfl: 0    Reviewed and updated with patient active problem list, medication list, allergies.     Review of Systems   Constitutional: Positive for fatigue. Negative for chills and fever.   HENT: Positive for congestion (Improving). Negative for ear pain, sinus pressure, sinus pain, sneezing and  "sore throat.    Respiratory: Positive for cough (Some thick green sputum at times, improving as well. ) and wheezing (At night).    Psychiatric/Behavioral: Positive for sleep disturbance.       Objective     Vital Signs  /80 (BP Location: Left arm, Patient Position: Sitting, Cuff Size: Regular Adult)   Pulse 80   Temp 36.8 °C (98.3 °F) (Temporal)   Resp 16   Ht 1.689 m (5' 6.5\")   Wt 82.7 kg (182 lb 6.4 oz)   LMP 01/03/2020   SpO2 98%   BMI 29.00 kg/m²     No visits with results within 1 Month(s) from this visit.   Latest known visit with results is:   Appointment on 08/09/2019   Component Date Value Ref Range Status   • TSH 08/09/2019 2.301  0.340 - 4.820 uIU/ml Final   • Triglycerides 08/09/2019 116  <=149 mg/dL Final   • Cholesterol 08/09/2019 235* 0 - 199 mg/dL Final   • HDL 08/09/2019 58  >=40 mg/dL Final   • LDL Calculated 08/09/2019 154* 20 - 99 mg/dL Final   • Fasting? 08/09/2019 Yes   Final       Physical Exam  Constitutional:       Appearance: Normal appearance.   HENT:      Head: Normocephalic.      Mouth/Throat:      Mouth: Mucous membranes are moist.      Pharynx: No posterior oropharyngeal erythema.   Cardiovascular:      Rate and Rhythm: Normal rate and regular rhythm.      Heart sounds: Normal heart sounds. No murmur. No friction rub. No gallop.    Pulmonary:      Effort: Pulmonary effort is normal. No respiratory distress.      Breath sounds: Normal breath sounds. No stridor. No wheezing, rhonchi or rales.   Chest:      Chest wall: No tenderness.   Skin:     General: Skin is warm and dry.   Neurological:      General: No focal deficit present.      Mental Status: She is alert.          Assessment/Plan     Diagnoses and all orders for this visit:    Cough  -     methylPREDNISolone (MEDROL DOSEPACK) 4 mg tablet; follow package directions  -     benzonatate (TESSALON) 100 mg capsule; Take 1 capsule (100 mg total) by mouth 3 (three) times a day as needed for cough for up to 7 days    1.  " Cough.  Patient has been treated with a 10-day course of Augmentin for bronchitis and a low-grade sinus infection.  Discussed with patient that she does not have physical examination findings that would elicit repeat antibiotic therapy.  Discussed that she likely has a post viral cough.  I am going to give her a Medrol Dosepak as well as some Tessalon Perles that she can use for cough suppression.  She may continue using the Mucinex, her inhaler and Robitussin.  She was also encouraged to drink plenty of fluids.  If she has any worsening of symptoms or if new symptoms develop, she should contact the clinic for follow-up evaluation.    Portions of this record may have been created with voice recognition software. Care has been taken to prevent errors, however occasional wrong-word, sound-a-like substitutions or grammatical errors may have occurred due to the inherent limitations of voice recognition software.

## 2020-03-10 ENCOUNTER — OFFICE VISIT (OUTPATIENT)
Dept: INTERNAL MEDICINE | Facility: CLINIC | Age: 45
End: 2020-03-10
Payer: COMMERCIAL

## 2020-03-10 VITALS
TEMPERATURE: 98.7 F | HEART RATE: 82 BPM | WEIGHT: 189 LBS | HEIGHT: 67 IN | SYSTOLIC BLOOD PRESSURE: 140 MMHG | OXYGEN SATURATION: 97 % | RESPIRATION RATE: 16 BRPM | DIASTOLIC BLOOD PRESSURE: 86 MMHG | BODY MASS INDEX: 29.66 KG/M2

## 2020-03-10 DIAGNOSIS — J06.9 VIRAL UPPER RESPIRATORY TRACT INFECTION: Primary | ICD-10-CM

## 2020-03-10 PROCEDURE — 99213 OFFICE O/P EST LOW 20 MIN: CPT | Performed by: NURSE PRACTITIONER

## 2020-03-10 ASSESSMENT — ENCOUNTER SYMPTOMS
SORE THROAT: 0
CHILLS: 0
RHINORRHEA: 1
DIZZINESS: 1
SINUS PAIN: 0
SINUS PRESSURE: 1
COUGH: 1
HEADACHES: 0
FEVER: 0

## 2020-03-10 ASSESSMENT — PAIN SCALES - GENERAL: PAINLEVEL: 0-NO PAIN

## 2020-03-10 NOTE — PROGRESS NOTES
"Deanne Morrison is a very pleasant 45-year-old female patient who I am seeing today for evaluation of a possible sinus infection.  She reports that she has been struggling with symptoms for about 10 days.  She has been using saline nasal rinses and Mucinex but has not had any improvement in her symptoms.  She denies any fever, chills, but does have some generalized nasal congestion, sinus pressure and bilateral ear discomfort.  She also has a cough that is predominantly worse at night.  She is also noticed that she has had some intermittent dizziness.      Medications    Current Outpatient Medications:   •  spironolactone (ALDACTONE) 25 mg tablet, Take 1 tablet (25 mg total) by mouth daily, Disp: 90 tablet, Rfl: 3  •  sertraline (ZOLOFT) 100 mg tablet, Take 1 tablet (100 mg total) by mouth daily, Disp: 90 tablet, Rfl: 3  •  levothyroxine (SYNTHROID, LEVOTHROID) 150 mcg tablet, Take 1 tablet (150 mcg total) by mouth daily, Disp: 90 tablet, Rfl: 3  •  albuterol HFA (PROVENTIL HFA;VENTOLIN HFA) 90 mcg/actuation inhaler, Inhale 2 puffs every 2 (two) hours as needed for wheezing (every 2-3 hours as needed for cough/wheezing), Disp: 1 Inhaler, Rfl: 1    Reviewed and updated with patient active problem list, medication list, allergies.     Review of Systems   Constitutional: Negative for chills and fever.   HENT: Positive for congestion, ear pain, rhinorrhea, sinus pressure and sneezing. Negative for nosebleeds, sinus pain and sore throat.    Respiratory: Positive for cough (More at night, non productive).    Neurological: Positive for dizziness (Slight). Negative for headaches.       Objective     Vital Signs  /86 (BP Location: Left arm, Patient Position: Sitting, Cuff Size: Long Adult)   Pulse 82   Temp 37.1 °C (98.7 °F) (Temporal)   Resp 16   Ht 1.689 m (5' 6.5\")   Wt 85.7 kg (189 lb)   LMP 02/17/2020 (Approximate)   SpO2 97%   BMI 30.05 kg/m²     No visits with results within 1 Month(s) from this " visit.   Latest known visit with results is:   Appointment on 08/09/2019   Component Date Value Ref Range Status   • TSH 08/09/2019 2.301  0.340 - 4.820 uIU/ml Final   • Triglycerides 08/09/2019 116  <=149 mg/dL Final   • Cholesterol 08/09/2019 235* 0 - 199 mg/dL Final   • HDL 08/09/2019 58  >=40 mg/dL Final   • LDL Calculated 08/09/2019 154* 20 - 99 mg/dL Final   • Fasting? 08/09/2019 Yes   Final       Physical Exam  Vitals signs reviewed.   Constitutional:       Appearance: Normal appearance.   HENT:      Head: Normocephalic.      Right Ear: Ear canal and external ear normal. A middle ear effusion is present.      Left Ear: Ear canal and external ear normal. A middle ear effusion is present.      Nose:      Right Turbinates: Swollen and pale.      Left Turbinates: Swollen and pale.      Right Sinus: No maxillary sinus tenderness or frontal sinus tenderness.      Left Sinus: No maxillary sinus tenderness or frontal sinus tenderness.      Mouth/Throat:      Comments: Some mild cobblestoning noted.  Cardiovascular:      Rate and Rhythm: Normal rate and regular rhythm.      Heart sounds: Normal heart sounds.   Pulmonary:      Effort: Pulmonary effort is normal.      Breath sounds: Normal breath sounds.   Neurological:      Mental Status: She is alert.          Assessment/Plan     Diagnoses and all orders for this visit:    Viral upper respiratory tract infection    1.  Viral upper respiratory tract infection.  Patient does have bilateral ear effusions, but otherwise her symptoms are consistent with upper respiratory tract infection.  I am going to have her  some Sudafed and continue with the saline nasal rinses.  If she has any progression of her symptoms, or if new symptoms develop, she is to let me know and we will see her back for follow-up evaluation.    Portions of this record may have been created with voice recognition software. Care has been taken to prevent errors, however occasional wrong-word,  sound-a-like substitutions or grammatical errors may have occurred due to the inherent limitations of voice recognition software.

## 2020-03-13 ENCOUNTER — OFFICE VISIT (OUTPATIENT)
Dept: INTERNAL MEDICINE | Facility: CLINIC | Age: 45
End: 2020-03-13
Payer: COMMERCIAL

## 2020-03-13 VITALS
BODY MASS INDEX: 29.66 KG/M2 | RESPIRATION RATE: 16 BRPM | HEART RATE: 96 BPM | SYSTOLIC BLOOD PRESSURE: 140 MMHG | HEIGHT: 67 IN | WEIGHT: 189 LBS | OXYGEN SATURATION: 95 % | DIASTOLIC BLOOD PRESSURE: 80 MMHG | TEMPERATURE: 98.2 F

## 2020-03-13 DIAGNOSIS — R05.9 COUGH: Primary | ICD-10-CM

## 2020-03-13 DIAGNOSIS — H65.93 MIDDLE EAR EFFUSION, BILATERAL: ICD-10-CM

## 2020-03-13 PROCEDURE — 99213 OFFICE O/P EST LOW 20 MIN: CPT | Performed by: NURSE PRACTITIONER

## 2020-03-13 RX ORDER — CEFDINIR 300 MG/1
300 CAPSULE ORAL 2 TIMES DAILY
Qty: 14 CAPSULE | Refills: 0 | Status: SHIPPED | OUTPATIENT
Start: 2020-03-13 | End: 2020-03-20

## 2020-03-13 ASSESSMENT — ENCOUNTER SYMPTOMS
SORE THROAT: 1
DIARRHEA: 1
FATIGUE: 0
CHILLS: 0
COUGH: 1
FEVER: 0
SINUS PRESSURE: 1
SINUS PAIN: 1

## 2020-03-13 ASSESSMENT — PAIN SCALES - GENERAL: PAINLEVEL: 0-NO PAIN

## 2020-03-13 NOTE — PROGRESS NOTES
Subjective     Tamiko is a very pleasant 45-year-old female patient who I am seeing today follow up evaluation of an upper respiratory infection  She reports that she has been struggling with symptoms for about 3 weeks now.  She had been using saline nasal rinses and Mucinex but has not had any improvement in her symptoms.  She denied any fever, chills, but does have some generalized nasal congestion, sinus pressure and bilateral ear discomfort.  She also has a cough that is predominantly worse at night. I saw her last week and recommended that she guse some Sudafed.  Unfortunately, the Sudafed did not make any improvement in her symptoms.  She is also had an intermittent headache and some loose stools this morning.  She is worried because she has young children at home.    Medications    Current Outpatient Medications:   •  spironolactone (ALDACTONE) 25 mg tablet, Take 1 tablet (25 mg total) by mouth daily, Disp: 90 tablet, Rfl: 3  •  sertraline (ZOLOFT) 100 mg tablet, Take 1 tablet (100 mg total) by mouth daily, Disp: 90 tablet, Rfl: 3  •  levothyroxine (SYNTHROID, LEVOTHROID) 150 mcg tablet, Take 1 tablet (150 mcg total) by mouth daily, Disp: 90 tablet, Rfl: 3  •  albuterol HFA (PROVENTIL HFA;VENTOLIN HFA) 90 mcg/actuation inhaler, Inhale 2 puffs every 2 (two) hours as needed for wheezing (every 2-3 hours as needed for cough/wheezing), Disp: 1 Inhaler, Rfl: 1  •  cefdinir (OMNICEF) 300 mg capsule, Take 1 capsule (300 mg total) by mouth 2 (two) times a day for 7 days, Disp: 14 capsule, Rfl: 0    Reviewed and updated with patient active problem list, medication list, allergies.     Review of Systems   Constitutional: Negative for chills, fatigue and fever.   HENT: Positive for congestion, ear pain, sinus pressure, sinus pain and sore throat.    Respiratory: Positive for cough (mostly at night).    Gastrointestinal: Positive for diarrhea (This morning).       Objective     Vital Signs  /80 (BP Location: Left  "arm, Patient Position: Sitting, Cuff Size: Long Adult)   Pulse 96   Temp 36.8 °C (98.2 °F) (Temporal)   Resp 16   Ht 1.689 m (5' 6.5\")   Wt 85.7 kg (189 lb)   LMP 02/17/2020 (Approximate)   SpO2 95%   BMI 30.05 kg/m²     No visits with results within 1 Month(s) from this visit.   Latest known visit with results is:   Appointment on 08/09/2019   Component Date Value Ref Range Status   • TSH 08/09/2019 2.301  0.340 - 4.820 uIU/ml Final   • Triglycerides 08/09/2019 116  <=149 mg/dL Final   • Cholesterol 08/09/2019 235* 0 - 199 mg/dL Final   • HDL 08/09/2019 58  >=40 mg/dL Final   • LDL Calculated 08/09/2019 154* 20 - 99 mg/dL Final   • Fasting? 08/09/2019 Yes   Final       Physical Exam  Vitals signs reviewed.   HENT:      Head: Normocephalic.      Right Ear: A middle ear effusion is present. Tympanic membrane is bulging.      Left Ear: A middle ear effusion is present. Tympanic membrane is bulging.      Nose: Congestion present. No nasal tenderness.      Right Turbinates: Enlarged, swollen and pale.      Left Turbinates: Enlarged, swollen and pale.   Cardiovascular:      Rate and Rhythm: Normal rate and regular rhythm.      Heart sounds: Normal heart sounds.   Pulmonary:      Effort: Pulmonary effort is normal.      Breath sounds: Normal breath sounds.   Neurological:      Mental Status: She is alert.          Assessment/Plan     Diagnoses and all orders for this visit:    Cough  -     cefdinir (OMNICEF) 300 mg capsule; Take 1 capsule (300 mg total) by mouth 2 (two) times a day for 7 days    Middle ear effusion, bilateral  -     cefdinir (OMNICEF) 300 mg capsule; Take 1 capsule (300 mg total) by mouth 2 (two) times a day for 7 days    1.  Cough.  Due to the duration of illness and patient having bilateral ear effusions, I am going to treat with antibiotics.  Patient has been ill for nearly 3 weeks.  She has not responded to conservative therapies.    2.  Bilateral middle ear effusion.  Patient does wear " bilateral hearing aids.  She does have a large amount of effusion present to both ears.  She does not have any maxillary or frontal sinus tenderness however, due to the duration of her symptoms persisting, I am going to treat with antibiotics.  I am going to place her on cefdinir, 300 mg twice daily for 7 days.  We did go over the importance of taking all of her antibiotics as prescribed.  She is to also continue with conservative therapies for symptomatic relief.    Portions of this record may have been created with voice recognition software. Care has been taken to prevent errors, however occasional wrong-word, sound-a-like substitutions or grammatical errors may have occurred due to the inherent limitations of voice recognition software.

## 2020-03-20 DIAGNOSIS — R05.9 COUGH: Primary | ICD-10-CM

## 2020-03-20 RX ORDER — METHYLPREDNISOLONE 4 MG/1
TABLET ORAL
Qty: 21 TABLET | Refills: 0 | Status: SHIPPED | OUTPATIENT
Start: 2020-03-20 | End: 2020-03-27

## 2020-04-22 DIAGNOSIS — R05.8 POST-VIRAL COUGH SYNDROME: ICD-10-CM

## 2020-04-22 DIAGNOSIS — J06.9 VIRAL UPPER RESPIRATORY TRACT INFECTION: ICD-10-CM

## 2020-04-22 DIAGNOSIS — R05.9 COUGH: Primary | ICD-10-CM

## 2020-04-22 RX ORDER — PREDNISONE 20 MG/1
40 TABLET ORAL DAILY
Qty: 10 TABLET | Refills: 0 | Status: SHIPPED | OUTPATIENT
Start: 2020-04-22 | End: 2020-04-27

## 2020-05-15 ENCOUNTER — OFFICE VISIT (OUTPATIENT)
Dept: URGENT CARE | Facility: URGENT CARE | Age: 45
End: 2020-05-15
Payer: COMMERCIAL

## 2020-05-15 ENCOUNTER — NURSE TRIAGE (OUTPATIENT)
Dept: FAMILY MEDICINE | Facility: CLINIC | Age: 45
End: 2020-05-15

## 2020-05-15 ENCOUNTER — ANCILLARY PROCEDURE (OUTPATIENT)
Dept: RADIOLOGY | Facility: URGENT CARE | Age: 45
End: 2020-05-15
Payer: COMMERCIAL

## 2020-05-15 VITALS
RESPIRATION RATE: 16 BRPM | SYSTOLIC BLOOD PRESSURE: 140 MMHG | WEIGHT: 174.5 LBS | HEART RATE: 102 BPM | BODY MASS INDEX: 27.39 KG/M2 | HEIGHT: 67 IN | OXYGEN SATURATION: 95 % | DIASTOLIC BLOOD PRESSURE: 88 MMHG | TEMPERATURE: 99.3 F

## 2020-05-15 DIAGNOSIS — J06.9 ACUTE URI: ICD-10-CM

## 2020-05-15 DIAGNOSIS — R06.02 SOB (SHORTNESS OF BREATH): ICD-10-CM

## 2020-05-15 DIAGNOSIS — R05.9 COUGH: Primary | ICD-10-CM

## 2020-05-15 DIAGNOSIS — Z20.828 CONTACT WITH OR EXPOSURE TO VIRAL DISEASE: Primary | ICD-10-CM

## 2020-05-15 LAB
FLUAV AG NPH QL IA: NEGATIVE
FLUBV AG NPH QL IA: NEGATIVE
SARS-COV-2 RNA RESP QL NAA+PROBE: NEGATIVE

## 2020-05-15 PROCEDURE — 71046 X-RAY EXAM CHEST 2 VIEWS: CPT | Mod: TC | Performed by: PHYSICIAN ASSISTANT

## 2020-05-15 PROCEDURE — 87635 SARS-COV-2 COVID-19 AMP PRB: CPT | Performed by: PHYSICIAN ASSISTANT

## 2020-05-15 PROCEDURE — C9803 HOPD COVID-19 SPEC COLLECT: HCPCS | Performed by: PHYSICIAN ASSISTANT

## 2020-05-15 PROCEDURE — 99213 OFFICE O/P EST LOW 20 MIN: CPT | Performed by: PHYSICIAN ASSISTANT

## 2020-05-15 PROCEDURE — 87804 INFLUENZA ASSAY W/OPTIC: CPT | Mod: QW,59 | Performed by: PHYSICIAN ASSISTANT

## 2020-05-15 RX ORDER — PREDNISONE 20 MG/1
40 TABLET ORAL DAILY
Qty: 10 TABLET | Refills: 0 | Status: SHIPPED | OUTPATIENT
Start: 2020-05-15 | End: 2020-05-20

## 2020-05-15 ASSESSMENT — PAIN SCALES - GENERAL: PAINLEVEL: 8

## 2020-05-15 NOTE — TELEPHONE ENCOUNTER
COVID-19 [facility]    CRITERIA FOR TESTING: Yes to Any Symptoms     SYMPTOM QUESTIONS    Are you experiencing a cough, fever, shortness of breath, chills, repeated shaking with chills, muscle pain, headache, sore throat or new loss of taste or smell? Cough and Shortness of Breath  Describe symptoms: LOWER BACK PAIN, PT USING SON'S NEBULIZER D/T SHORTNESS OF BREATH, pt states she can hear wheezing and has hard time breathing at night.  Date of symptom onset: 5/14/20 is when cough and sob and feels she has fluid in her lungs; all has worsen in last 24 hours.  Although has had ongoing cough since January and attempts of steroids and antibiotics has not cleared the cough.    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 no  Details on close contact:n/a    Where do you think the exposure occurred? within the community   If during travel, where and when? pt stays home and isolated, although has had to grocery shop in community    Are you seeing a physician and taking prescribed medications for any medical conditions? Immunosuppression pt has been on steroids of and on.       Reason for Disposition  • COVID-19 symptoms per CDC guidelines (with worsening symptoms or medical concerns).    Protocols used: COVID-19  EMPLOYEE SCREENING    Pt is concerned because cough and sob and being able to breath has worsened.  She also feels her lungs are filling up with fluid. She would like to see a provider.   Per protocol lab order placed for Covid 19 under Dr. Hsu.  Pt given all information for appointment and care advice.  Pt and spouse on phone together and verbalized understanding.

## 2020-05-15 NOTE — PROGRESS NOTES
"Subjective      HPI    Tamiko Tiwari is a 45 y.o. female who presents for lower back pain, cough and new SOB.  Patient reports that she can hear wheezing and has a hard time breathing at night. Cough and SOB started 05/14/20.  Pt has been using her son's nebulizer and her inhaler every four hours for relief. She reports that she has had an ongoing cough since January and attempts of steroids and antibiotics have not cleared the cough.  PT denies any facial or dental pain, tender swollen lymph nodes, wheezing, or shortness of breath. She has had associated nasal congestion. No known ill contacts. She considered presenting to the ED last night secondary to difficulties breathing.      Review of Systems    As noted in HPI.      Objective   /88   Pulse 102   Temp 37.4 °C (99.3 °F) (Temporal)   Resp 16   Ht 1.689 m (5' 6.5\")   Wt 79.2 kg (174 lb 8 oz)   SpO2 95%   BMI 27.74 kg/m²     Physical Exam  Vitals signs and nursing note reviewed.   Constitutional:       General: She is not in acute distress.     Appearance: Normal appearance.   HENT:      Right Ear: Tympanic membrane, ear canal and external ear normal.      Left Ear: Tympanic membrane and external ear normal.      Nose: Congestion present.      Mouth/Throat:      Mouth: Mucous membranes are moist.      Pharynx: No posterior oropharyngeal erythema.   Cardiovascular:      Rate and Rhythm: Normal rate and regular rhythm.      Heart sounds: Normal heart sounds.   Pulmonary:      Effort: Pulmonary effort is normal.      Breath sounds: Wheezing (Expiratory wheeze in the RMF. Diminished breath sounds throughout lung fields.) present.   Skin:     General: Skin is warm and dry.   Neurological:      Mental Status: She is alert and oriented to person, place, and time.         Recent Results (from the past 4 hour(s))   Rapid influenza A/B antigens    Collection Time: 05/15/20  8:39 AM    Specimen: Nasopharynx; Swab   Result Value Ref Range    Influenza A Ag, " EIA Negative Negative    Influenza B Ag, EIA Negative Negative     X-ray Chest 2 Views    Result Date: 5/15/2020  XR CHEST 2 VIEWS 05/15/2020 HISTORY:  Chest pain TECHNIQUE:  Chest, 2 views.  PA and lateral views were performed. COMPARISON:  1/25/2019 FINDINGS: The lungs are clear. No pleural effusions. No pneumothorax. The heart size is normal. The pulmonary vascularity is normal. The mediastinal contour is normal.     IMPRESSION:  1.  No evidence of an acute pulmonary process.       Assessment/Plan   Diagnoses and all orders for this visit:    Cough  -     Rapid influenza A/B antigens  -     COVID-19 PCR  -     COVID-19 MOLECULAR (PCR)  -     COVID-19 ([place])  -     X-ray chest 2 views    SOB (shortness of breath)  -     predniSONE (DELTASONE) 20 mg tablet; Take 2 tablets (40 mg total) by mouth daily for 5 days    Acute URI        Discussion:   Rapid influenza negative. COVID testing pending and patient will be notified of the results when they are available. Home isolation advised. CXR ordered and was negative for any acute abnormalities. Given symptoms and physical exam, I discussed with patient this is likely viral URI. Given she has had difficulty with her breathing, I think steroids would be beneficial in this case since she has been having to use her albuterol every four hours to remain comfortable. Continue symptomatic cares including saline nasal rinse, hot steamy showers, chloraseptic lozenges, salt water gargle, tylenol and ibuprofen for fever/pain, mucolytics, antitussives, and decongestants. Continue to follow with PCP regarding ongoing cough. Expected course of this diagnosis discussed with pt. Pt will f/u in clinic prn persisting or worsening symptoms including worsening SOB, chest pain, high fever, or other new concerning symptoms.  Pt verbalizes understanding and agrees with plan.       BRYCE Rose

## 2020-05-15 NOTE — LETTER
[facility]  [address] [place] [address]  [phone]  Dept: [phone]  May 15, 2020       Tamiko Tiwari  [address]  [place] [address]        Patient: Tamiko Tiwari  YOB: 1975  Today's Date: 05/15/20         To Whom it May Concern:         Tamiko Tiwari was evaluated today by the urgent care. Based on the symptoms she reported, she meets criteria for COVID-19 testing and home quarantine.     ISOLATION GUIDELINES: [facility] recommends that individuals suspected of having COVID-19 self-isolate at home until COVID-19 testing has been completed OR the following occur:     * Patient does not have a fever, without the use of fever-reducing medications, for at least 72 hours (at least 3 full days)   AND   * Patient has improvement in respiratory symptoms (i.e. cough, shortness of breath)   AND   * At least 7 days have passed since symptoms first appeared       Sincerely,         Megan Merino PA-C

## 2020-05-15 NOTE — PATIENT INSTRUCTIONS
Patient Education     Upper Respiratory Infection, Adult  An upper respiratory infection (URI) is a common viral infection of the nose, throat, and upper air passages that lead to the lungs. The most common type of URI is the common cold. URIs usually get better on their own, without medical treatment.  What are the causes?  A URI is caused by a virus. You may catch a virus by:  · Breathing in droplets from an infected person's cough or sneeze.  · Touching something that has been exposed to the virus (contaminated) and then touching your mouth, nose, or eyes.  What increases the risk?  You are more likely to get a URI if:  · You are very young or very old.  · It is adwoa or winter.  · You have close contact with others, such as at a , school, or health care facility.  · You smoke.  · You have long-term (chronic) heart or lung disease.  · You have a weakened disease-fighting (immune) system.  · You have nasal allergies or asthma.  · You are experiencing a lot of stress.  · You work in an area that has poor air circulation.  · You have poor nutrition.  What are the signs or symptoms?  A URI usually involves some of the following symptoms:  · Runny or stuffy (congested) nose.  · Sneezing.  · Cough.  · Sore throat.  · Headache.  · Fatigue.  · Fever.  · Loss of appetite.  · Pain in your forehead, behind your eyes, and over your cheekbones (sinus pain).  · Muscle aches.  · Redness or irritation of the eyes.  · Pressure in the ears or face.  How is this diagnosed?  This condition may be diagnosed based on your medical history and symptoms, and a physical exam. Your health care provider may use a cotton swab to take a mucus sample from your nose (nasal swab). This sample can be tested to determine what virus is causing the illness.  How is this treated?  URIs usually get better on their own within 7-10 days. You can take steps at home to relieve your symptoms. Medicines cannot cure URIs, but your health care  provider may recommend certain medicines to help relieve symptoms, such as:  · Over-the-counter cold medicines.  · Cough suppressants. Coughing is a type of defense against infection that helps to clear the respiratory system, so take these medicines only as recommended by your health care provider.  · Fever-reducing medicines.  Follow these instructions at home:  Activity  · Rest as needed.  · If you have a fever, stay home from work or school until your fever is gone or until your health care provider says you are no longer contagious. Your health care provider may have you wear a face mask to prevent your infection from spreading.  Relieving symptoms  · Gargle with a salt-water mixture 3-4 times a day or as needed. To make a salt-water mixture, completely dissolve ½-1 tsp of salt in 1 cup of warm water.  · Use a cool-mist humidifier to add moisture to the air. This can help you breathe more easily.  Eating and drinking    · Drink enough fluid to keep your urine pale yellow.  · Eat soups and other clear broths.  General instructions    · Take over-the-counter and prescription medicines only as told by your health care provider. These include cold medicines, fever reducers, and cough suppressants.  · Do not use any products that contain nicotine or tobacco, such as cigarettes and e-cigarettes. If you need help quitting, ask your health care provider.  · Stay away from secondhand smoke.  · Stay up to date on all immunizations, including the yearly (annual) flu vaccine.  · Keep all follow-up visits as told by your health care provider. This is important.  How to prevent the spread of infection to others    · URIs can be passed from person to person (are contagious). To prevent the infection from spreading:  ? Wash your hands often with soap and water. If soap and water are not available, use hand .  ? Avoid touching your mouth, face, eyes, or nose.  ? Cough or sneeze into a tissue or your sleeve or elbow  instead of into your hand or into the air.  Contact a health care provider if:  · You are getting worse instead of better.  · You have a fever or chills.  · Your mucus is brown or red.  · You have yellow or brown discharge coming from your nose.  · You have pain in your face, especially when you bend forward.  · You have swollen neck glands.  · You have pain while swallowing.  · You have white areas in the back of your throat.  Get help right away if:  · You have shortness of breath that gets worse.  · You have severe or persistent:  ? Headache.  ? Ear pain.  ? Sinus pain.  ? Chest pain.  · You have chronic lung disease along with any of the following:  ? Wheezing.  ? Prolonged cough.  ? Coughing up blood.  ? A change in your usual mucus.  · You have a stiff neck.  · You have changes in your:  ? Vision.  ? Hearing.  ? Thinking.  ? Mood.  Summary  · An upper respiratory infection (URI) is a common infection of the nose, throat, and upper air passages that lead to the lungs.  · A URI is caused by a virus.  · URIs usually get better on their own within 7-10 days.  · Medicines cannot cure URIs, but your health care provider may recommend certain medicines to help relieve symptoms.  This information is not intended to replace advice given to you by your health care provider. Make sure you discuss any questions you have with your health care provider.  Document Released: 06/13/2002 Document Revised: 12/26/2019 Document Reviewed: 08/03/2018  [employer] Patient Education © 2020 [employer]

## 2020-05-18 ENCOUNTER — TELEPHONE (OUTPATIENT)
Dept: INTERNAL MEDICINE | Facility: CLINIC | Age: 45
End: 2020-05-18

## 2020-05-18 NOTE — TELEPHONE ENCOUNTER
Caller would like to discuss (a) return call Writer has advised caller of a callback from within 24 hours.    Patient: Tamiko Tiwari    Caller Name (Last and first, relation/role): self    Name of Facility: [facility]    Callback Number: [phone]    Best Availability: self      Fax Number: na    Additional Info: would like a same day appt. Has been seeing [facility] for a cough for 6 months about this cough. Pt was tested for COVID 19 5/15 results are negative. Declined Telemed rather have in person visit.     Did you confirm the message with the caller: Yes    Is it okay that the nurse communicates your response through MyChart? No

## 2020-05-18 NOTE — TELEPHONE ENCOUNTER
Called patient, she was in agreement to see kayy brito tomorrow at 830am. Did not see she was scheduled with an MD until I called her but she was in agreement to switching to see Kayy, her PCP.

## 2020-05-18 NOTE — TELEPHONE ENCOUNTER
Caller would like to discuss (a) return call Writer has advised caller of a callback from within 24 hours.    Patient: Tamiko Tiwari    Caller Name (Last and first, relation/role): self    Name of Facility: [facility]    Callback Number: [phone]    Best Availability: any    Fax Number: na    Additional Info: referral to pulmnology, ENT, and allergist. Cough is not getting any better need to find next step solutions as insurance will end June 30th.     Did you confirm the message with the caller: Yes    Is it okay that the nurse communicates your response through MyChart? No

## 2020-05-19 ENCOUNTER — OFFICE VISIT (OUTPATIENT)
Dept: INTERNAL MEDICINE | Facility: CLINIC | Age: 45
End: 2020-05-19
Payer: COMMERCIAL

## 2020-05-19 VITALS
HEIGHT: 67 IN | TEMPERATURE: 98.7 F | BODY MASS INDEX: 27.72 KG/M2 | DIASTOLIC BLOOD PRESSURE: 84 MMHG | OXYGEN SATURATION: 96 % | HEART RATE: 78 BPM | RESPIRATION RATE: 16 BRPM | SYSTOLIC BLOOD PRESSURE: 130 MMHG | WEIGHT: 176.6 LBS

## 2020-05-19 DIAGNOSIS — R05.3 CHRONIC COUGH: Primary | ICD-10-CM

## 2020-05-19 DIAGNOSIS — R10.826 EPIGASTRIC ABDOMINAL TENDERNESS WITH REBOUND TENDERNESS: ICD-10-CM

## 2020-05-19 PROCEDURE — 99214 OFFICE O/P EST MOD 30 MIN: CPT | Performed by: NURSE PRACTITIONER

## 2020-05-19 RX ORDER — ESOMEPRAZOLE MAGNESIUM 40 MG/1
40 CAPSULE, DELAYED RELEASE ORAL
Qty: 30 CAPSULE | Refills: 0 | Status: SHIPPED | OUTPATIENT
Start: 2020-05-19 | End: 2021-05-19

## 2020-05-19 ASSESSMENT — ENCOUNTER SYMPTOMS
SINUS PAIN: 0
CHEST TIGHTNESS: 1
GASTROINTESTINAL NEGATIVE: 1
CHILLS: 0
SORE THROAT: 0
WHEEZING: 1
FEVER: 0
HEADACHES: 1
TROUBLE SWALLOWING: 0
SINUS PRESSURE: 0
VOICE CHANGE: 1
COUGH: 1
SHORTNESS OF BREATH: 1

## 2020-05-19 ASSESSMENT — PAIN SCALES - GENERAL: PAINLEVEL: 5

## 2020-05-19 NOTE — PROGRESS NOTES
Deanne Morrison is a pleasant 45-year-old female patient who I am seeing today for an urgent care follow-up visit.  She has been struggling with a chronic cough for approximately 6 months.  In December, she was experiencing a postnasal drip and a cough that was occasionally productive of phlegm that was predominantly worse at night.  She was given Augmentin for acute bacterial sinusitis and bronchitis.  Then, approximately 2 weeks later, I saw her for a continued cough that did not respond to the antibiotics.  Therefore, I placed her on a Medrol dose pack and Tessalon Perles.  Then, approximately 8 weeks later, she again returned to the clinic with concerns of a possible sinus infection.  Physical exam at that time yielded bilateral ear infections, and I recommended that she trial some over-the-counter Sudafed and saline nasal rinses.  She had progressive symptoms and a worsening cough, so she returned to the clinic few days later and I then opted to place her on cefdinir.    Over this last weekend, she presented to 1 of our urgent cares because she was having continued cough but new onset left-sided chest discomfort and shortness of breath.  She had been using her son's nebulizer and her previously prescribed inhaler every 4 hours to relieve symptoms, but there was no real improvement.  She reports that she is experiencing a dull left-sided chest pressure, that is significantly worse with coughing and deep breathing.  At urgent care, she was placed on 40 mg of prednisone once daily which she will complete today.  She reports that her cough is productive at times of thick, green sputum.  She also reports that she is very fatigued.  She denies any fever, sore throat, but she is noted to be very hoarse today.      Medications    Current Outpatient Medications:   •  predniSONE (DELTASONE) 20 mg tablet, Take 2 tablets (40 mg total) by mouth daily for 5 days, Disp: 10 tablet, Rfl: 0  •  spironolactone (ALDACTONE)  "25 mg tablet, Take 1 tablet (25 mg total) by mouth daily, Disp: 90 tablet, Rfl: 3  •  sertraline (ZOLOFT) 100 mg tablet, Take 1 tablet (100 mg total) by mouth daily, Disp: 90 tablet, Rfl: 3  •  levothyroxine (SYNTHROID, LEVOTHROID) 150 mcg tablet, Take 1 tablet (150 mcg total) by mouth daily, Disp: 90 tablet, Rfl: 3  •  albuterol HFA (PROVENTIL HFA;VENTOLIN HFA) 90 mcg/actuation inhaler, Inhale 2 puffs every 2 (two) hours as needed for wheezing (every 2-3 hours as needed for cough/wheezing), Disp: 1 Inhaler, Rfl: 1    Reviewed and updated with patient active problem list, medication list, allergies.     Review of Systems   Constitutional: Negative for chills and fever.   HENT: Positive for postnasal drip and voice change. Negative for ear pain, sinus pressure, sinus pain, sore throat and trouble swallowing.    Respiratory: Positive for cough (Productive at times ), chest tightness, shortness of breath and wheezing.         \"Takes so much energy to get anything coughed up\"   Cardiovascular: Positive for chest pain (Worse with coughing, slightly worse with deep breathing but is always there).   Gastrointestinal: Negative.    Neurological: Positive for headaches (Thursday and Friday nights, none today).       Objective     Vital Signs  /84 (BP Location: Left arm, Patient Position: Sitting, Cuff Size: Long Adult)   Pulse 78   Temp 37.1 °C (98.7 °F) (Temporal)   Resp 16   Ht 1.689 m (5' 6.5\")   Wt 80.1 kg (176 lb 9.6 oz)   LMP 05/04/2020 (Approximate)   SpO2 96%   BMI 28.08 kg/m²     Office Visit on 05/15/2020   Component Date Value Ref Range Status   • Influenza A Ag, EIA 05/15/2020 Negative  Negative Final   • Influenza B Ag, EIA 05/15/2020 Negative  Negative Final   • COVID-19 PCR 05/15/2020 Negative  Negative Final       Physical Exam  Vitals signs reviewed.   Constitutional:       Appearance: Normal appearance.   Cardiovascular:      Rate and Rhythm: Normal rate and regular rhythm.      Heart sounds: " Normal heart sounds. No murmur. No friction rub. No gallop.    Pulmonary:      Effort: Pulmonary effort is normal. No respiratory distress.      Breath sounds: Normal breath sounds. No stridor. No wheezing, rhonchi or rales.   Chest:      Chest wall: No tenderness.   Abdominal:      General: Abdomen is flat. Bowel sounds are normal.      Tenderness: There is abdominal tenderness in the epigastric area.   Neurological:      Mental Status: She is alert.       Assessment/Plan     Diagnoses and all orders for this visit:    Chronic cough  -     esomeprazole (NexIUM) 40 mg capsule; Take 1 capsule (40 mg total) by mouth every morning before breakfast  -     CT sinus without IV contrast; Future  -     CT chest without IV contrast; Future    Epigastric abdominal tenderness with rebound tenderness    1.  Chronic cough.  As patient has been treated with multiple courses of antibiotics as well as oral steroids and is still experiencing a chronic cough, we will proceed with further work-up today.  I will place orders for a sinus CT and chest CT.  Encourage patient to finish her oral steroids as previously prescribed.    2.  Epigastric abdominal tenderness.  This is mild on physical exam.  Did discuss silent reflux with patient and how this can contribute to chronic cough.  I am going to place her on 40 mg of Nexium once daily.  I did go over how PPIs worked best on an empty stomach and when that she should take them for them to be most effective.    May consider referral to allergist, ENT or pulmonology depending on her x-ray results.  Due to the COVID-19 pandemic, we will try to arrange follow-up once we have a formal plan of care in place.  Patient is in agreement to the above plan of care and wishes to proceed.  Discussed that she will be contacted by the hospital to schedule her imaging once we have authorization from her insurance.    Portions of this record may have been created with voice recognition software. Care has  been taken to prevent errors, however occasional wrong-word, sound-a-like substitutions or grammatical errors may have occurred due to the inherent limitations of voice recognition software.

## 2020-05-27 ENCOUNTER — HOSPITAL ENCOUNTER (OUTPATIENT)
Dept: CT IMAGING | Facility: HOSPITAL | Age: 45
Discharge: HOME | End: 2020-05-27
Payer: COMMERCIAL

## 2020-05-27 DIAGNOSIS — I31.39 PERICARDIAL EFFUSION: ICD-10-CM

## 2020-05-27 DIAGNOSIS — J32.9 RECURRENT SINUSITIS: ICD-10-CM

## 2020-05-27 DIAGNOSIS — E34.8 PINEAL GLAND CYST: ICD-10-CM

## 2020-05-27 DIAGNOSIS — R05.3 CHRONIC COUGH: ICD-10-CM

## 2020-05-27 DIAGNOSIS — J34.89 SINUS MUCOSAL THICKENING: Primary | ICD-10-CM

## 2020-05-27 PROCEDURE — 70486 CT MAXILLOFACIAL W/O DYE: CPT | Mod: MG

## 2020-05-27 PROCEDURE — 71250 CT THORAX DX C-: CPT | Mod: MG

## 2020-05-27 NOTE — PROGRESS NOTES
Spoke with patient regarding CT results.  Patient would like ENT referral to [facility].  Will  Follow up on pineal cyst with brain MRI.  Echo ordered for pericardial effusion.  '

## 2020-06-01 ENCOUNTER — HOSPITAL ENCOUNTER (OUTPATIENT)
Dept: MRI IMAGING | Facility: HOSPITAL | Age: 45
Discharge: HOME | End: 2020-06-01
Payer: COMMERCIAL

## 2020-06-01 DIAGNOSIS — E34.8 PINEAL GLAND CYST: ICD-10-CM

## 2020-06-01 PROCEDURE — 2550000100 HC RX 255: Performed by: NURSE PRACTITIONER

## 2020-06-01 PROCEDURE — 70553 MRI BRAIN STEM W/O & W/DYE: CPT | Mod: MG

## 2020-06-01 PROCEDURE — A9579 GAD-BASE MR CONTRAST NOS,1ML: HCPCS | Performed by: NURSE PRACTITIONER

## 2020-06-01 RX ADMIN — GADOTERIDOL 15 ML: 279.3 INJECTION, SOLUTION INTRAVENOUS at 17:45

## 2020-06-03 ENCOUNTER — TELEPHONE (OUTPATIENT)
Dept: OTOLARYNGOLOGY | Facility: CLINIC | Age: 45
End: 2020-06-03

## 2020-06-03 DIAGNOSIS — R06.2 WHEEZING: Primary | ICD-10-CM

## 2020-06-03 RX ORDER — PREDNISONE 20 MG/1
40 TABLET ORAL DAILY
Qty: 10 TABLET | Refills: 0 | Status: SHIPPED | OUTPATIENT
Start: 2020-06-03 | End: 2020-06-08

## 2020-06-08 ENCOUNTER — OFFICE VISIT (OUTPATIENT)
Dept: OTOLARYNGOLOGY | Facility: CLINIC | Age: 45
End: 2020-06-08
Payer: COMMERCIAL

## 2020-06-08 VITALS
TEMPERATURE: 99.1 F | HEIGHT: 67 IN | DIASTOLIC BLOOD PRESSURE: 103 MMHG | OXYGEN SATURATION: 95 % | HEART RATE: 76 BPM | BODY MASS INDEX: 26.68 KG/M2 | SYSTOLIC BLOOD PRESSURE: 159 MMHG | WEIGHT: 170 LBS

## 2020-06-08 DIAGNOSIS — J34.89 SINUS MUCOSAL THICKENING: ICD-10-CM

## 2020-06-08 DIAGNOSIS — R05.3 CHRONIC COUGH: ICD-10-CM

## 2020-06-08 DIAGNOSIS — J32.9 RECURRENT SINUSITIS: ICD-10-CM

## 2020-06-08 PROCEDURE — 99203 OFFICE O/P NEW LOW 30 MIN: CPT | Performed by: OTOLARYNGOLOGY

## 2020-06-08 RX ORDER — CETIRIZINE HYDROCHLORIDE 10 MG/1
10 TABLET ORAL DAILY
COMMUNITY
End: 2021-07-02 | Stop reason: ALTCHOICE

## 2020-06-08 RX ORDER — AMOXICILLIN AND CLAVULANATE POTASSIUM 875; 125 MG/1; MG/1
1 TABLET, FILM COATED ORAL 2 TIMES DAILY WITH MEALS
Qty: 42 TABLET | Refills: 0 | Status: SHIPPED | OUTPATIENT
Start: 2020-06-08 | End: 2020-06-29

## 2020-06-08 RX ORDER — FLUTICASONE PROPIONATE 50 MCG
2 SPRAY, SUSPENSION (ML) NASAL DAILY
Qty: 32 G | Refills: 2 | Status: SHIPPED | OUTPATIENT
Start: 2020-06-08 | End: 2021-06-08 | Stop reason: WASHOUT

## 2020-06-08 ASSESSMENT — ENCOUNTER SYMPTOMS
BRUISES/BLEEDS EASILY: 0
LIGHT-HEADEDNESS: 1
CARDIOVASCULAR NEGATIVE: 1
COUGH: 1
SINUS PRESSURE: 0
EYE ITCHING: 0
VOMITING: 0
CHILLS: 0
DYSPHORIC MOOD: 0
CONSTIPATION: 0
TROUBLE SWALLOWING: 0
ENDOCRINE NEGATIVE: 1
DIARRHEA: 0
SLEEP DISTURBANCE: 0
SORE THROAT: 0
ALLERGIC/IMMUNOLOGIC NEGATIVE: 1
CONSTITUTIONAL NEGATIVE: 1
DIZZINESS: 0
EYES NEGATIVE: 1
WOUND: 0
MUSCULOSKELETAL NEGATIVE: 1
APNEA: 0
GASTROINTESTINAL NEGATIVE: 1
RHINORRHEA: 0
WHEEZING: 1
PSYCHIATRIC NEGATIVE: 1
ADENOPATHY: 0
FEVER: 0
SHORTNESS OF BREATH: 1
HEMATOLOGIC/LYMPHATIC NEGATIVE: 1
HEADACHES: 0

## 2020-06-08 NOTE — PROGRESS NOTES
Subjective    CC: cough, abnormal CT sinus    HPI: Tamiko Tiwari is a 45 y.o. female with cough starting in Dec 2019. She has been seen in urgent care multiple times and been on a couple antibiotics as well as on some steroids. None of the meds seemed to help for more than a couple weeks.     Her main symptoms throughout this were cough and shortness of breath. She denies sinus pressure, nasal d/c, stuffy. No h/o hayfever. No h/o asthma or other lung diseases.    She does have labored breathing with the cough.    She had a sinus CT on 27 May that showed some chronic sinusitis.    Past Medical History:   Diagnosis Date   • Allergic Pollen -    • Depression    • Hypertension    • Hypothyroid        Past Surgical History:   Procedure Laterality Date   •  SECTION           Current Outpatient Medications:   •  cetirizine (ZyrTEC) 10 mg tablet, Take 10 mg by mouth daily, Disp: , Rfl:   •  predniSONE (DELTASONE) 20 mg tablet, Take 2 tablets (40 mg total) by mouth daily for 5 days (Patient not taking: Reported on 2020 ), Disp: 10 tablet, Rfl: 0  •  esomeprazole (NexIUM) 40 mg capsule, Take 1 capsule (40 mg total) by mouth every morning before breakfast (Patient not taking: Reported on 2020 ), Disp: 30 capsule, Rfl: 0  •  spironolactone (ALDACTONE) 25 mg tablet, Take 1 tablet (25 mg total) by mouth daily, Disp: 90 tablet, Rfl: 3  •  sertraline (ZOLOFT) 100 mg tablet, Take 1 tablet (100 mg total) by mouth daily, Disp: 90 tablet, Rfl: 3  •  levothyroxine (SYNTHROID, LEVOTHROID) 150 mcg tablet, Take 1 tablet (150 mcg total) by mouth daily, Disp: 90 tablet, Rfl: 3  •  albuterol HFA (PROVENTIL HFA;VENTOLIN HFA) 90 mcg/actuation inhaler, Inhale 2 puffs every 2 (two) hours as needed for wheezing (every 2-3 hours as needed for cough/wheezing), Disp: 1 Inhaler, Rfl: 1    Allergies   Allergen Reactions   • Pollen Extracts      Runny nose, congestion       family history includes Alcohol abuse in her maternal  "grandfather and paternal grandfather; Cancer in her mother's sister; Heart failure in her maternal grandmother; Hypertension in her brother and mother; Multiple sclerosis in her father's brother; Other in her mother's brother, sister, and sister; Pacemaker/AICD in her paternal grandmother; Prostate cancer in her father; Stroke in her mother's brother; Tachycardia in her sister.    Social History     Tobacco Use   • Smoking status: Never Smoker   • Smokeless tobacco: Never Used   Substance Use Topics   • Alcohol use: Not Currently     Alcohol/week: 0.0 standard drinks   • Drug use: No       Review of Systems   Constitutional: Negative.  Negative for chills and fever.   HENT: Negative.  Negative for congestion, ear discharge, ear pain, hearing loss, postnasal drip, rhinorrhea, sinus pressure, sneezing, sore throat, tinnitus and trouble swallowing.    Eyes: Negative.  Negative for itching and visual disturbance.   Respiratory: Positive for cough, shortness of breath and wheezing. Negative for apnea.    Cardiovascular: Negative.  Negative for chest pain.   Gastrointestinal: Negative.  Negative for constipation, diarrhea and vomiting.   Endocrine: Negative.  Negative for cold intolerance and heat intolerance.   Genitourinary: Negative.    Musculoskeletal: Negative.    Skin: Negative.  Negative for rash and wound.   Allergic/Immunologic: Negative.  Negative for environmental allergies and food allergies.   Neurological: Positive for light-headedness. Negative for dizziness and headaches.   Hematological: Negative.  Negative for adenopathy. Does not bruise/bleed easily.   Psychiatric/Behavioral: Negative.  Negative for dysphoric mood and sleep disturbance.       Objective    BP (!) 159/103 (BP Location: Right arm, Patient Position: Sitting, Cuff Size: Large Long Adult)   Pulse 76   Temp 37.3 °C (99.1 °F) (Temporal)   Ht 1.689 m (5' 6.5\")   Wt 77.1 kg (170 lb)   SpO2 95%   BMI 27.03 kg/m²     PHYSICAL " EXAMINATION:      GENERAL:  Well-developed, well-nourished.  The patient is alert, oriented and in no acute distress.        PSYCH: Normal mood and affect.        SKIN: No evidence of significant rash or concerning skin lesion on the head or neck.      HEAD/FACE:  Normally formed without evidence of mass or trauma.  She has tenderness over the maxillary and ethmoid sinuses bilaterally    EARS: Bilateral external ears are normal.  Bilateral external auditory canals are normal.  Bilateral tympanic membranes intact and normal.      NOSE:  The external nose appears normal.  The septum is mostly midline.  The turbinates appear normal.  There is mild edema of the nasal tissues but no pus or polyps.      ORAL CAVITY/OROPHARYNX:  There are no focal masses or lesions.  There are normal mucous membranes.  The tonsils appear normal without mass or focal lesion.  Oropharynx mucosa appears normal.      NECK:  There is no palpable adenopathy, goiter or mass.  The trachea is midline.      Diagnosis    1. Sinus mucosal thickening    2. Chronic cough    3. Recurrent sinusitis        Recommendations  Patient with CT scan findings indicative of chronic sinusitis.  I did review the CT scan images and report with the patient present.  There is chronic pansinusitis involving maxillary, ethmoid and sphenoid sinuses bilaterally with right greater than left frontal sinus involvement as well.  I recommend going ahead with aggressive medical therapy consisting of sinus rinses, Flonase and 3 weeks of Augmentin.  Follow-up with me in 3 weeks.  If not feeling better we will need to go ahead and get a repeat CT scan of the sinuses to assess for residual chronic sinusitis that may require surgery.

## 2020-06-18 ENCOUNTER — ANCILLARY PROCEDURE (OUTPATIENT)
Dept: CARDIOLOGY | Facility: CLINIC | Age: 45
End: 2020-06-18
Payer: COMMERCIAL

## 2020-06-18 VITALS
BODY MASS INDEX: 27.32 KG/M2 | WEIGHT: 170 LBS | SYSTOLIC BLOOD PRESSURE: 140 MMHG | DIASTOLIC BLOOD PRESSURE: 90 MMHG | HEIGHT: 66 IN

## 2020-06-18 DIAGNOSIS — I31.39 PERICARDIAL EFFUSION: ICD-10-CM

## 2020-06-18 LAB
ASCENDING AORTA: 2.94 CM
AV MEAN GRADIENT: 1.7 MMHG
AV PEAK GRADIENT: 3.24 MMHG
BSA FOR ECHO PROCEDURE: 1.89 M2
DOP CALC AO PEAK VEL: 0.9 M/S
DOP CALC AO VTI: 17.5 CM
DOP CALC LVOT DIAMETER: 1.96 CM
DOP CALC LVOT STROKE VOLUME: 57 CM3
DOP CALC MV VTI: 19.67 CM
DOP CALC RVOT PEAK VEL: 0.7 M/S
E/A RATIO: 0.8
E/E' RATIO (AVERAGE): 6.6
E/E' RATIO: 7.1
EJECTION FRACTION: 67 %
ERAP: 5 MMHG
INTERVENTRICULAR SEPTUM: 0.8 CM (ref 0.6–1.1)
IVC PROX: 1.13 CM
LA AREA A4C SYSTOLE: 16.3 CM3
LEFT ATRIUM SIZE: 2.64 CM
LEFT ATRIUM VOLUME INDEX: 20 ML/M2
LEFT INTERNAL DIMENSION IN SYSTOLE: 2.5 CM (ref 2.61–3.95)
LEFT VENTRICLE DIASTOLIC VOLUME: 77 CM3
LEFT VENTRICLE SYSTOLIC VOLUME: 26 CM3
LEFT VENTRICULAR INTERNAL DIMENSION IN DIASTOLE: 4 CM (ref 4.41–6.13)
LVAD-AP2: 24.5 CM2
MV DEC SLOPE: 3790 MM/S2
MV DT: 269 MS
MV MEAN GRADIENT: 1.7 MMHG
MV PEAK A VEL: 79 CM/S
MV PEAK E VEL: 64.5 CM/S
MV PEAK GRADIENT: 3 MMHG
MV STENOSIS PRESSURE HALF TIME: 65 MS
MV VALVE AREA P 1/2 METHOD: 3.38 CM2
MV VMAX: 85 CM/S
MVA (VTI): 2.9 CM2
PADP: 5.02 MMHG
POSTERIOR WALL: 0.8 CM (ref 0.6–1.1)
PR END VMAX: 112 CM/S
PULM VEIN S/D RATIO: 0.9
PV PEAK D VEL: 59 CM/S
PV PEAK GRADIENT: 3.84 MMHG
PV PEAK S VEL: 56 CM/S
PV VMAX: 0.98 M/S
RA AREA: 13 CM2
RH CV ECHO AV VALVE AREA VEL: 3.1 CM2
RH CV ECHO AV VALVE AREA VTI: 3.3 CM2
RIGHT VENTRICULAR INTERNAL DIMENSION IN DIASTOLE: 2.5 CM
RV AP4 BASE: 2.4 CM
S': 14 CM/S
TDI: 9 CM/S
TDILATERAL: 10.8 CM/S
TR MAX PG: 18.49 MMHG
TRICUSPID ANNULAR PLANE SYSTOLIC EXCURSION: 2.4 CM
TRICUSPID VALVE PEAK REGURGITATION VELOCITY: 2.15 M/S
TV REST PULMONARY ARTERY PRESSURE: 23 MMHG
Z-SCORE OF LEFT VENTRICULAR DIMENSION IN END DIASTOLE: -2.62
Z-SCORE OF LEFT VENTRICULAR DIMENSION IN END SYSTOLE: -1.97

## 2020-06-18 PROCEDURE — 93306 TTE W/DOPPLER COMPLETE: CPT | Performed by: INTERNAL MEDICINE

## 2020-06-30 ENCOUNTER — HOSPITAL ENCOUNTER (EMERGENCY)
Age: 45
Discharge: HOME | End: 2020-06-30
Attending: EMERGENCY MEDICINE

## 2020-06-30 ENCOUNTER — APPOINTMENT (OUTPATIENT)
Dept: GENERAL RADIOLOGY | Age: 45
End: 2020-06-30
Attending: EMERGENCY MEDICINE

## 2020-06-30 VITALS
HEIGHT: 66 IN | DIASTOLIC BLOOD PRESSURE: 79 MMHG | HEART RATE: 112 BPM | TEMPERATURE: 98.3 F | OXYGEN SATURATION: 94 % | SYSTOLIC BLOOD PRESSURE: 145 MMHG | BODY MASS INDEX: 29.41 KG/M2 | RESPIRATION RATE: 19 BRPM | WEIGHT: 182.98 LBS

## 2020-06-30 DIAGNOSIS — R06.2 WHEEZING: ICD-10-CM

## 2020-06-30 DIAGNOSIS — R05.3 PERSISTENT COUGH: Primary | ICD-10-CM

## 2020-06-30 LAB
ALBUMIN SERPL-MCNC: 3.3 G/DL (ref 3.6–5.1)
ALBUMIN/GLOB SERPL: 0.9 {RATIO} (ref 1–2.4)
ALP SERPL-CCNC: 112 UNITS/L (ref 45–117)
ALT SERPL-CCNC: 30 UNITS/L
ANION GAP SERPL CALC-SCNC: 9 MMOL/L (ref 10–20)
AST SERPL-CCNC: 21 UNITS/L
ATRIAL RATE (BPM): 115
BASOPHILS # BLD: 0.2 K/MCL (ref 0–0.3)
BASOPHILS NFR BLD: 1 %
BILIRUB SERPL-MCNC: 0.2 MG/DL (ref 0.2–1)
BUN SERPL-MCNC: 15 MG/DL (ref 6–20)
BUN/CREAT SERPL: 24 (ref 7–25)
CALCIUM SERPL-MCNC: 8.8 MG/DL (ref 8.4–10.2)
CHLORIDE SERPL-SCNC: 106 MMOL/L (ref 98–107)
CO2 SERPL-SCNC: 27 MMOL/L (ref 21–32)
CREAT SERPL-MCNC: 0.63 MG/DL (ref 0.51–0.95)
D DIMER PPP FEU-MCNC: 0.42 MG/L (FEU)
DEPRECATED RDW RBC: 54.8 FL (ref 39–50)
DIASTOLIC BLOOD PRESSURE: 101
EOSINOPHIL # BLD: 2.3 K/MCL (ref 0.1–0.5)
EOSINOPHIL NFR BLD: 20 %
ERYTHROCYTE [DISTWIDTH] IN BLOOD: 18.1 % (ref 11–15)
FASTING DURATION TIME PATIENT: ABNORMAL H
GFR SERPLBLD BASED ON 1.73 SQ M-ARVRAT: >90 ML/MIN/1.73M2
GLOBULIN SER-MCNC: 3.8 G/DL (ref 2–4)
GLUCOSE SERPL-MCNC: 106 MG/DL (ref 65–99)
HCT VFR BLD CALC: 37.6 % (ref 36–46.5)
HGB BLD-MCNC: 11.8 G/DL (ref 12–15.5)
IMM GRANULOCYTES # BLD AUTO: 0 K/MCL (ref 0–0.2)
IMM GRANULOCYTES # BLD: 0 %
LYMPHOCYTES # BLD: 2.1 K/MCL (ref 1–4.8)
LYMPHOCYTES NFR BLD: 18 %
MAGNESIUM SERPL-MCNC: 2 MG/DL (ref 1.7–2.4)
MCH RBC QN AUTO: 25.8 PG (ref 26–34)
MCHC RBC AUTO-ENTMCNC: 31.4 G/DL (ref 32–36.5)
MCV RBC AUTO: 82.1 FL (ref 78–100)
MONOCYTES # BLD: 1.1 K/MCL (ref 0.3–0.9)
MONOCYTES NFR BLD: 9 %
NEUTROPHILS # BLD: 6 K/MCL (ref 1.8–7.7)
NEUTROPHILS NFR BLD: 52 %
NRBC BLD MANUAL-RTO: 0 /100 WBC
NT-PROBNP SERPL-MCNC: 128 PG/ML
P AXIS (DEGREES): 80
PLATELET # BLD AUTO: 327 K/MCL (ref 140–450)
POTASSIUM SERPL-SCNC: 3.9 MMOL/L (ref 3.4–5.1)
PR-INTERVAL (MSEC): 142
PROT SERPL-MCNC: 7.1 G/DL (ref 6.4–8.2)
QRS-INTERVAL (MSEC): 84
QT-INTERVAL (MSEC): 348
QTC: 481
R AXIS (DEGREES): 89
RAINBOW EXTRA TUBES HOLD SPECIMEN: NORMAL
RBC # BLD: 4.58 MIL/MCL (ref 4–5.2)
REPORT TEXT: NORMAL
SODIUM SERPL-SCNC: 138 MMOL/L (ref 135–145)
SYSTOLIC BLOOD PRESSURE: 169
T AXIS (DEGREES): 73
TROPONIN I SERPL HS-MCNC: <0.02 NG/ML
VENTRICULAR RATE EKG/MIN (BPM): 115
WBC # BLD: 11.8 K/MCL (ref 4.2–11)

## 2020-06-30 PROCEDURE — 83880 ASSAY OF NATRIURETIC PEPTIDE: CPT | Performed by: EMERGENCY MEDICINE

## 2020-06-30 PROCEDURE — 85379 FIBRIN DEGRADATION QUANT: CPT | Performed by: EMERGENCY MEDICINE

## 2020-06-30 PROCEDURE — 10004157 XR CHEST AP OR PA

## 2020-06-30 PROCEDURE — 83735 ASSAY OF MAGNESIUM: CPT | Performed by: EMERGENCY MEDICINE

## 2020-06-30 PROCEDURE — 71045 X-RAY EXAM CHEST 1 VIEW: CPT

## 2020-06-30 PROCEDURE — 94644 CONT INHLJ TX 1ST HOUR: CPT

## 2020-06-30 PROCEDURE — 99284 EMERGENCY DEPT VISIT MOD MDM: CPT

## 2020-06-30 PROCEDURE — 80053 COMPREHEN METABOLIC PANEL: CPT | Performed by: EMERGENCY MEDICINE

## 2020-06-30 PROCEDURE — 85025 COMPLETE CBC W/AUTO DIFF WBC: CPT | Performed by: EMERGENCY MEDICINE

## 2020-06-30 PROCEDURE — 93005 ELECTROCARDIOGRAM TRACING: CPT | Performed by: EMERGENCY MEDICINE

## 2020-06-30 PROCEDURE — 96375 TX/PRO/DX INJ NEW DRUG ADDON: CPT

## 2020-06-30 PROCEDURE — 36415 COLL VENOUS BLD VENIPUNCTURE: CPT

## 2020-06-30 PROCEDURE — 10002800 HB RX 250 W HCPCS: Performed by: EMERGENCY MEDICINE

## 2020-06-30 PROCEDURE — 84484 ASSAY OF TROPONIN QUANT: CPT | Performed by: EMERGENCY MEDICINE

## 2020-06-30 PROCEDURE — 96365 THER/PROPH/DIAG IV INF INIT: CPT

## 2020-06-30 PROCEDURE — 10002801 HB RX 250 W/O HCPCS: Performed by: EMERGENCY MEDICINE

## 2020-06-30 RX ORDER — PREDNISONE 10 MG/1
TABLET ORAL
Qty: 34 TABLET | Refills: 0 | Status: SHIPPED | OUTPATIENT
Start: 2020-06-30

## 2020-06-30 RX ORDER — IPRATROPIUM BROMIDE AND ALBUTEROL SULFATE 2.5; .5 MG/3ML; MG/3ML
3 SOLUTION RESPIRATORY (INHALATION) EVERY 6 HOURS PRN
Qty: 180 ML | Refills: 0 | Status: SHIPPED | OUTPATIENT
Start: 2020-06-30

## 2020-06-30 RX ORDER — METHYLPREDNISOLONE SODIUM SUCCINATE 125 MG/2ML
125 INJECTION, POWDER, LYOPHILIZED, FOR SOLUTION INTRAMUSCULAR; INTRAVENOUS ONCE
Status: COMPLETED | OUTPATIENT
Start: 2020-06-30 | End: 2020-06-30

## 2020-06-30 RX ADMIN — ALBUTEROL SULFATE 7.5 MG: 2.5 SOLUTION RESPIRATORY (INHALATION) at 03:21

## 2020-06-30 RX ADMIN — MAGNESIUM SULFATE HEPTAHYDRATE 2 G: 40 INJECTION, SOLUTION INTRAVENOUS at 03:06

## 2020-06-30 RX ADMIN — METHYLPREDNISOLONE SODIUM SUCCINATE 125 MG: 125 INJECTION, POWDER, FOR SOLUTION INTRAMUSCULAR; INTRAVENOUS at 03:03

## 2020-06-30 ASSESSMENT — PAIN DESCRIPTION - PAIN TYPE
TYPE: ACUTE PAIN
TYPE: ACUTE PAIN

## 2020-06-30 ASSESSMENT — PAIN SCALES - GENERAL
PAINLEVEL_OUTOF10: 8
PAINLEVEL_OUTOF10: 8

## 2020-07-06 ENCOUNTER — OFFICE VISIT (OUTPATIENT)
Dept: OTOLARYNGOLOGY | Facility: CLINIC | Age: 45
End: 2020-07-06
Payer: COMMERCIAL

## 2020-07-06 ENCOUNTER — APPOINTMENT (OUTPATIENT)
Dept: LAB | Facility: CLINIC | Age: 45
End: 2020-07-06
Payer: COMMERCIAL

## 2020-07-06 VITALS
TEMPERATURE: 99.9 F | BODY MASS INDEX: 27.32 KG/M2 | DIASTOLIC BLOOD PRESSURE: 99 MMHG | HEART RATE: 117 BPM | SYSTOLIC BLOOD PRESSURE: 147 MMHG | HEIGHT: 66 IN | OXYGEN SATURATION: 94 % | WEIGHT: 170 LBS

## 2020-07-06 DIAGNOSIS — J32.9 CHRONIC SINUSITIS, UNSPECIFIED LOCATION: ICD-10-CM

## 2020-07-06 DIAGNOSIS — J32.9 CHRONIC SINUSITIS, UNSPECIFIED LOCATION: Primary | ICD-10-CM

## 2020-07-06 PROCEDURE — 36415 COLL VENOUS BLD VENIPUNCTURE: CPT | Performed by: OTOLARYNGOLOGY

## 2020-07-06 PROCEDURE — 99214 OFFICE O/P EST MOD 30 MIN: CPT | Performed by: OTOLARYNGOLOGY

## 2020-07-06 RX ORDER — IPRATROPIUM BROMIDE AND ALBUTEROL SULFATE 2.5; .5 MG/3ML; MG/3ML
SOLUTION RESPIRATORY (INHALATION)
COMMUNITY
Start: 2020-06-30 | End: 2021-07-02 | Stop reason: ALTCHOICE

## 2020-07-06 ASSESSMENT — ENCOUNTER SYMPTOMS
TROUBLE SWALLOWING: 0
FEVER: 0
WHEEZING: 1
SORE THROAT: 0
COUGH: 1
CONSTITUTIONAL NEGATIVE: 1
CHILLS: 0
RHINORRHEA: 0
SINUS PRESSURE: 0

## 2020-07-06 NOTE — PROGRESS NOTES
Subjective    CC:  F/u chronic sinusitis    HPI: Tamiko Tiwari is a 45 y.o. female who saw me in early  for abnormal sinus CT in the setting of a chronic cough since Dec 2019. At that time she stated her main symptoms were cough and shortness of breath. Steroids and antibiotics have helped several times.     I treated her with three weeks of antibiotics that she has just finished. She feels like it didn't help.    She states the duo-neb and the sinus rinses seem to help. Her CT sinuses from May of this year showed moderate maxillary disease and severe ethmoid disease.    Her snot 22 score today is 33 points and she scored highest in areas of poor sleep, fatigue and waking up tired.        Past Medical History:   Diagnosis Date   • Allergic Pollen -    • Depression    • Hypertension    • Hypothyroid        Past Surgical History:   Procedure Laterality Date   •  SECTION           Current Outpatient Medications:   •  ipratropium-albuteroL (DUO-NEB) 0.5-2.5 mg/3 mL nebulizer solution, U 3 ML VIA NEB Q 6 H PRF PRECIOUS, Disp: , Rfl:   •  cetirizine (ZyrTEC) 10 mg tablet, Take 10 mg by mouth daily, Disp: , Rfl:   •  fluticasone propionate (FLONASE) 50 mcg/actuation nasal spray, Administer 2 sprays into each nostril daily, Disp: 32 g, Rfl: 2  •  esomeprazole (NexIUM) 40 mg capsule, Take 1 capsule (40 mg total) by mouth every morning before breakfast (Patient not taking: Reported on 2020 ), Disp: 30 capsule, Rfl: 0  •  spironolactone (ALDACTONE) 25 mg tablet, Take 1 tablet (25 mg total) by mouth daily, Disp: 90 tablet, Rfl: 3  •  sertraline (ZOLOFT) 100 mg tablet, Take 1 tablet (100 mg total) by mouth daily, Disp: 90 tablet, Rfl: 3  •  levothyroxine (SYNTHROID, LEVOTHROID) 150 mcg tablet, Take 1 tablet (150 mcg total) by mouth daily, Disp: 90 tablet, Rfl: 3  •  albuterol HFA (PROVENTIL HFA;VENTOLIN HFA) 90 mcg/actuation inhaler, Inhale 2 puffs every 2 (two) hours as needed for wheezing (every 2-3 hours  "as needed for cough/wheezing), Disp: 1 Inhaler, Rfl: 1    Allergies   Allergen Reactions   • Pollen Extracts      Runny nose, congestion       family history includes Alcohol abuse in her maternal grandfather and paternal grandfather; Cancer in her mother's sister; Heart failure in her maternal grandmother; Hypertension in her brother and mother; Multiple sclerosis in her father's brother; Other in her mother's brother, sister, and sister; Pacemaker/AICD in her paternal grandmother; Prostate cancer in her father; Stroke in her mother's brother; Tachycardia in her sister.    Social History     Tobacco Use   • Smoking status: Never Smoker   • Smokeless tobacco: Never Used   Substance Use Topics   • Alcohol use: Not Currently     Alcohol/week: 0.0 standard drinks   • Drug use: No       Review of Systems   Constitutional: Negative.  Negative for chills and fever.   HENT: Negative for congestion, ear discharge, ear pain, hearing loss, nosebleeds, postnasal drip, rhinorrhea, sinus pressure, sneezing, sore throat, tinnitus and trouble swallowing.    Respiratory: Positive for cough and wheezing.        Objective    /99 (BP Location: Left arm, Patient Position: Sitting, Cuff Size: Large Long Adult)   Pulse 117   Temp 37.7 °C (99.9 °F) (Temporal)   Ht 1.676 m (5' 6\")   Wt 77.1 kg (170 lb)   SpO2 94%   BMI 27.44 kg/m²     PHYSICAL EXAMINATION:      GENERAL:  Well-developed, well-nourished.  The patient is alert, oriented and in no acute distress.        PSYCH: Normal mood and affect.        SKIN: No evidence of significant rash or concerning skin lesion on the head or neck.      HEAD/FACE:  Normally formed without evidence of mass or trauma.  The sinuses are nontender.        EARS: Bilateral external ears are normal.  Bilateral external auditory canals are normal.  Bilateral tympanic membranes intact and normal.      NOSE:  The external nose appears normal.  The septum is mostly midline.  The turbinates appear " normal.        ORAL CAVITY/OROPHARYNX:  There are no focal masses or lesions.  There are normal mucous membranes.  The tonsils appear normal without mass or focal lesion.  Oropharynx mucosa appears normal.      NECK:  There is no palpable adenopathy, goiter or mass.  The trachea is midline.      Diagnosis    1. Chronic sinusitis, unspecified location        Recommendations    Patient with significant chronic sinusitis on previous CT.  She did not improve any with medication.  We will go ahead and schedule repeat CT sinuses.  She may need to consider endoscopic sinus surgery if the sinusitis on CT has not improved.  With that said most of her issues right now are shortness of breath and cough and she does have an appointment in pulmonary medicine tomorrow.  If they find a direct pulmonary cause of her symptoms then we may be able to hold off on the sinus investigations.  I am going to check a allergy panel today as well.

## 2020-07-07 LAB
A ALTERNATA IGE QN: <0.35 KU/L
A FUMIGATUS IGE QN: <0.35 KU/L
BERMUDA GRASS IGE QN: <0.35 KU/L
BOXELDER IGE QN: <0.35 KU/L
CAT DANDER IGE QN: <0.35 KU/L
COMMON RAGWEED IGE QN: <0.35 KU/L
COTTONWOOD IGE QN: <0.35 KU/L
D FARINAE IGE QN: <0.35 KU/L
DOG DANDER IGE QN: <0.35 KU/L
EAST WHITE PINE IGE QN: <0.35 KU/L
MUGWORT IGE QN: <0.35 KU/L
NETTLE IGE QN: <0.35 KU/L
P NOTATUM IGE QN: <0.35 KU/L
SHEEP SORREL IGE QN: <0.35 KU/L
SPRUCE IGE QN: <0.35 KU/L
TIMOTHY IGE QN: <0.35 KU/L
WHITE ASH IGE QN: <0.35 KU/L
WHITE ELM IGE QN: <0.35 KU/L
WHITE OAK IGE QN: <0.35 KU/L

## 2020-07-10 DIAGNOSIS — R30.9 PAINFUL URINATION: Primary | ICD-10-CM

## 2020-07-10 RX ORDER — NITROFURANTOIN 25; 75 MG/1; MG/1
100 CAPSULE ORAL 2 TIMES DAILY
Qty: 10 CAPSULE | Refills: 0 | Status: SHIPPED | OUTPATIENT
Start: 2020-07-10 | End: 2020-07-15

## 2020-07-15 DIAGNOSIS — J82.83 EOSINOPHILIC ASTHMA: Primary | ICD-10-CM

## 2020-07-15 DIAGNOSIS — R94.2 ABNORMAL PFT: ICD-10-CM

## 2020-07-23 ENCOUNTER — TELEPHONE (OUTPATIENT)
Dept: INTERNAL MEDICINE | Facility: CLINIC | Age: 45
End: 2020-07-23

## 2020-07-23 NOTE — TELEPHONE ENCOUNTER
Caller would like to discuss (a) return call Writer has advised caller of a callback from within 24 hours.    Patient: Tamiko Tiwari    Caller Name (Last and first, relation/role): self     Name of Facility:     Callback Number: [phone]  Ok to leave [facility]     Best Availability:anytime      Fax Number:     Additional Info: would like to talk to nurse,in regards to working with [facility] or put in order for IL 5 inhibitor injection     Did you confirm the message with the caller:     Is it okay that the nurse communicates your response through [facility]? No

## 2020-07-24 NOTE — TELEPHONE ENCOUNTER
I called our pulmonology department and left a message with one of their nurses to see if they do IL 5 inhibitor injections. The message advised to call back so that I can get back to the patient. I will also call Tamiko to let her know that we are looking into this injection with our pulmonology department.

## 2020-07-27 NOTE — TELEPHONE ENCOUNTER
I called pulmonology back and spoke with one of the nurses that will ask one of the pulmonologists if they do the IL 5 inhibitor injections. She advised that she would call me back at my extension with an answer.

## 2020-07-27 NOTE — TELEPHONE ENCOUNTER
I poke with Casandra in pulmonology. She asked BRYCE Kramer about the IL 5 inhibitor injections and Violet advised that Tamiko would have to be seen by one of the pulmonologists first before setting up the IL 5 inhibitor injections but they would be able to order them and get them set up.

## 2020-07-27 NOTE — TELEPHONE ENCOUNTER
Tried contacting Tamiko, no answer. I left a detailed message advising that our pulmonology department would like to see her before setting up the injections that she had called about. The message advised for her to keep her appointment that is scheduled for Aug 7th to establish with pulmonology and then they will set up the IL 5 inhibitor injections. I advised that [facility] would prefer that our pulmonology office coordinate these injections due to them being for her asthma and pulmonology managing that. The message advised to call back with any questions.

## 2020-08-07 ENCOUNTER — CONSULT (OUTPATIENT)
Dept: PULMONOLOGY | Facility: CLINIC | Age: 45
End: 2020-08-07
Payer: COMMERCIAL

## 2020-08-07 VITALS
DIASTOLIC BLOOD PRESSURE: 110 MMHG | BODY MASS INDEX: 28.73 KG/M2 | HEART RATE: 79 BPM | SYSTOLIC BLOOD PRESSURE: 164 MMHG | WEIGHT: 178 LBS | OXYGEN SATURATION: 96 %

## 2020-08-07 DIAGNOSIS — J45.51 SEVERE PERSISTENT ASTHMA WITH ACUTE EXACERBATION: Primary | ICD-10-CM

## 2020-08-07 DIAGNOSIS — D72.19 PERIPHERAL EOSINOPHILIA: ICD-10-CM

## 2020-08-07 DIAGNOSIS — I10 ESSENTIAL HYPERTENSION: ICD-10-CM

## 2020-08-07 PROCEDURE — 99204 OFFICE O/P NEW MOD 45 MIN: CPT | Performed by: INTERNAL MEDICINE

## 2020-08-07 RX ORDER — MONTELUKAST SODIUM 10 MG/1
10 TABLET ORAL NIGHTLY
Qty: 30 TABLET | Refills: 11 | Status: SHIPPED | OUTPATIENT
Start: 2020-08-07 | End: 2021-08-07

## 2020-08-07 RX ORDER — FLUTICASONE PROPIONATE AND SALMETEROL 500; 50 UG/1; UG/1
1 POWDER RESPIRATORY (INHALATION) 2 TIMES DAILY
Qty: 180 INHALER | Refills: 1 | Status: SHIPPED | OUTPATIENT
Start: 2020-08-07 | End: 2021-02-03 | Stop reason: WASHOUT

## 2020-08-07 RX ORDER — BUDESONIDE 0.5 MG/2ML
INHALANT ORAL
COMMUNITY
Start: 2020-07-10 | End: 2021-10-27 | Stop reason: ALTCHOICE

## 2020-08-07 RX ORDER — FLUTICASONE PROPIONATE AND SALMETEROL 250; 50 UG/1; UG/1
1 POWDER RESPIRATORY (INHALATION) 2 TIMES DAILY
COMMUNITY
Start: 2020-07-10 | End: 2020-08-07 | Stop reason: DRUGHIGH

## 2020-08-07 RX ORDER — PREDNISONE 10 MG/1
TABLET ORAL
COMMUNITY
Start: 2020-06-30 | End: 2021-07-02 | Stop reason: ALTCHOICE

## 2020-08-07 ASSESSMENT — ENCOUNTER SYMPTOMS
SORE THROAT: 0
DYSURIA: 0
WEAKNESS: 0
ARTHRALGIAS: 0
ADENOPATHY: 0
HYPERACTIVE: 1
EYE REDNESS: 0
EYE PAIN: 0
ABDOMINAL PAIN: 0
APPETITE CHANGE: 0
NAUSEA: 0
FATIGUE: 0
FEVER: 0
VOICE CHANGE: 1
VOMITING: 0
DIARRHEA: 0

## 2020-08-07 ASSESSMENT — PAIN SCALES - GENERAL: PAINLEVEL: 0-NO PAIN

## 2020-08-07 NOTE — PROGRESS NOTES
Pulmonary Initial Encounter  Primary Care Physician: LIZETH LINO CNP    Chief Complaint:  Chief Complaint   Patient presents with   • Consult     Saw pulmonologist at [facility]. Pt states trouble breathing and states she was dx with eosinophilic asthma.        HPI:  Tamiko Tiwari is a 45 y.o. female who presents for recent diagnosis of eosinophilic asthma.  Patient states that symptoms started up to a year ago.  But have really increased over the last 8 months.  She states that the frequent cough is the worst, and was also coughing up green sputum.  Both her primary care and ENT worked her up for chronic sinusitis and gave her multiple rounds of steroids and antibiotics.  She would improve on said steroids, but then relapse.  At the height of her symptoms she was so short of breath that she could hardly walk from the bed to the bathroom.  At the beginning of July she saw a pulmonologist at [facility] who diagnosed her with eosinophilic asthma based on high peripheral eosinophils, and FeNO of 60, and PFT showing obstruction.  Her FEV1 was 1.01 L, 33% predicted.  Her her FEV1/FVC ratio was less than the lower limit of normal suggesting a component of restriction likely secondary to air trapping.  She did have a robust bronchodilator response.  Prior to this patient had only ever been on albuterol nebulizers and inhalers.  She was then started on a prolonged prednisone taper in addition to fluticasone salmeterol 250/50 twice daily in addition to budesonide intranasally.  She has been slowly tapering down this prednisone dose and is supposed to start 20 mg tomorrow.  There was discussion about adding anti-IL-5 inhibitors.  She otherwise feels very well controlled currently.  Her asthma control test score was a 25 out of 25.  She has not had to use her rescue inhaler since starting the prednisone again.  Prior to this she was having frequent nocturnal awakenings, using her albuterol about every 4 hours, and having  excessive coughing fits with significant sputum production.     Patient denies any known symptoms of asthma as a child.  She states that in 2017 she moved from [place].  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  She denies any changes in their home.  She denies changes in detergents.  They have new sheets, comforter, and bed.  She denies any improvement when they go on vacation.  Her family does have a dog at home and did get a new dog in 2017 when they moved.  They did previously have dogs at home and she has not had any problems or known allergies to a dog.  She denies ever being hospitalized for shortness of breath she has needed 4-5 courses of prednisone over the past year.    The following have been reviewed and updated as appropriate in this visit:  Allergies  Meds  Problems  Med Hx  Surg Hx  Fam Hx         Allergies   Allergen Reactions   • Pollen Extracts      Runny nose, congestion     Current Outpatient Medications   Medication Sig Dispense Refill   • budesonide (PULMICORT) 0.5 mg/2 mL nebulizer solution Add 1 respule to 8 ounces saline and irrigate each side of nose twice daily as directed.     • predniSONE 10 mg tablet Take 40 mg a day for 2 weeks, then 30 mg a day for 2 weeks, then 20 mg a day for 2 weeks, then 10 mg a day for 2 weeks and then contact your pulmonologist     • ipratropium-albuteroL (DUO-NEB) 0.5-2.5 mg/3 mL nebulizer solution U 3 ML VIA NEB Q 6 H PRF Z     • esomeprazole (NexIUM) 40 mg capsule Take 1 capsule (40 mg total) by mouth every morning before breakfast 30 capsule 0   • spironolactone (ALDACTONE) 25 mg tablet Take 1 tablet (25 mg total) by mouth daily 90 tablet 3   • sertraline (ZOLOFT) 100 mg tablet Take 1 tablet (100 mg total) by mouth daily 90 tablet 3   • levothyroxine (SYNTHROID, LEVOTHROID) 150 mcg tablet Take 1 tablet (150 mcg total) by mouth daily 90 tablet 3   • albuterol HFA (PROVENTIL HFA;VENTOLIN HFA) 90 mcg/actuation inhaler Inhale 2 puffs  every 2 (two) hours as needed for wheezing (every 2-3 hours as needed for cough/wheezing) 1 Inhaler 1   • montelukast (SINGULAIR) 10 mg tablet Take 1 tablet (10 mg total) by mouth nightly 30 tablet 11   • fluticasone propion-salmeteroL (ADVAIR DISKUS) 500-50 mcg/dose diskus inhaler Inhale 1 puff 2 (two) times a day Rinse mouth with water after use. Do not swallow. 180 Inhaler 1   • cetirizine (ZyrTEC) 10 mg tablet Take 10 mg by mouth daily     • fluticasone propionate (FLONASE) 50 mcg/actuation nasal spray Administer 2 sprays into each nostril daily (Patient not taking: Reported on 2020 ) 32 g 2     No current facility-administered medications for this visit.      Past Medical History:   Diagnosis Date   • Allergic Pollen -    • Asthma    • Depression    • Hypertension    • Hypothyroid      Past Surgical History:   Procedure Laterality Date   •  SECTION       Family History   Problem Relation Age of Onset   • Prostate cancer Father    • Hypertension Mother    • Other Sister         Hysterectomy due to benign tumors    • Hypertension Brother    • Heart failure Maternal Grandmother    • Alcohol abuse Maternal Grandfather    • Pacemaker/AICD Paternal Grandmother    • Alcohol abuse Paternal Grandfather    • Tachycardia Sister    • Other Sister         Recent abnormal mammogram; benign   • Multiple sclerosis Father's Brother    • Cancer Mother's Sister         unsure of what type   • Other Mother's Brother    • Stroke Mother's Brother      Social History     Occupational History   • Occupation: Ambulatory Director     Employer: [employer]   Tobacco Use   • Smoking status: Never Smoker   • Smokeless tobacco: Never Used   Substance and Sexual Activity   • Alcohol use: Not Currently     Alcohol/week: 0.0 standard drinks   • Drug use: No   • Sexual activity: Yes     Partners: Male     Birth control/protection: None   Social History Narrative   • Not on file       Review of Systems   Constitutional:  Negative for appetite change, fatigue and fever.        Feeling wired while on prednisone.    HENT: Positive for congestion and voice change. Negative for mouth sores and sore throat.    Eyes: Negative for pain and redness.   Respiratory:        SEE HPI   Cardiovascular: Negative for chest pain and leg swelling.   Gastrointestinal: Negative for abdominal pain, diarrhea, nausea and vomiting.   Endocrine: Negative for cold intolerance and heat intolerance.   Genitourinary: Negative for dysuria.   Musculoskeletal: Negative for arthralgias.   Skin: Negative for rash.   Neurological: Negative for weakness.   Hematological: Negative for adenopathy.   Psychiatric/Behavioral: The patient is hyperactive.        Objective   BP (!) 164/110 (BP Location: Right arm, Patient Position: Sitting, Cuff Size: Regular Adult)   Pulse 79   Wt 80.7 kg (178 lb)   SpO2 96%   BMI 28.73 kg/m²     Physical Exam  Vitals signs reviewed.   Constitutional:       General: She is not in acute distress.     Appearance: Normal appearance. She is obese. She is not diaphoretic.   HENT:      Head: Normocephalic and atraumatic.      Mouth/Throat:      Mouth: Mucous membranes are moist.      Pharynx: Oropharynx is clear. No oropharyngeal exudate or posterior oropharyngeal erythema.   Eyes:      General: No scleral icterus.     Conjunctiva/sclera: Conjunctivae normal.      Pupils: Pupils are equal, round, and reactive to light.   Neck:      Musculoskeletal: Neck supple.   Cardiovascular:      Rate and Rhythm: Normal rate and regular rhythm.      Pulses: Normal pulses.      Heart sounds: No murmur. No gallop.    Pulmonary:      Effort: No respiratory distress.      Breath sounds: No wheezing, rhonchi or rales.   Musculoskeletal:         General: No swelling.      Right lower leg: No edema.      Left lower leg: No edema.   Skin:     General: Skin is warm and dry.   Neurological:      Mental Status: She is alert and oriented to person, place, and time.       Motor: No weakness.           Pulmonary Functions Testing Results:    Reviewed PFTs from [place] July 2020, FEV1 1.01L with 33% pred, FVC 2.61L, FEV1/FVC ratio decreased. --Very severe obstruction, FVC reduced likely due to air trapping. TLC normal. Significant bronchodilator response.     Imaging:Reviewed Prior CT from May 2020 without significant lung findings.    Lab Results   Component Value Date    GLUCOSE 93 08/09/2019    CALCIUM 9.3 08/09/2019     08/09/2019    K 4.0 08/09/2019    CO2 24 08/09/2019     08/09/2019    BUN 12 08/09/2019    CREATININE 0.68 08/09/2019    ANIONGAP 7 08/09/2019     Lab Results   Component Value Date    WBC 6.5 08/09/2019    HGB 11.6 08/09/2019    HCT 35.5 08/09/2019    MCV 80.9 (L) 08/09/2019     08/09/2019     No results found for: PT, INR, APTT     ASSESSMENT AND PLAN   Severe persistent asthma with acute exacerbation  Patient has significant history of eosinophilic asthma, given her FEV1 and symptomatology she has severe persistent asthma.  She continues on her prednisone taper.  She is currently reducing to 20 mg and then to 10 mg.  I would like her to continue this taper until she is off.  She is also currently on fluticasone-salmeterol 250/50 and budesonide intranasally.  She is well controlled on this regimen but cannot stay on prednisone long-term. I did review the [facility] notes with her. She has only ever been on an inhaled corticosteroid for the last month.  She has never been tried off of systemic steroids with only inhaled corticosteroid for maintenance therapy.  We discussed the addition of anti-IL-5 inhibitors.  We discussed that those may be appropriate in the long run, but at this point she needs a trial purely on inhaled corticosteroids/LABA.  We discussed increasing her fluticasone salmeterol dose to 500/50 mcg twice daily.  She should come off the prednisone.  We will also plan to start Singulair 10 mg daily.  We will plan to see how this  regimen goes. If not controlled, then would need to restart prednisone and then consider anti-IL5 therapy at that point.    Plan to get repeat spirometry now as she appears to be controlled.  We will also get CBC with differential and IgE level.    Essential hypertension  Hypertensive today at 160.  We did discuss at that this may be attributable to the steroid.  We will plan to let her come off steroids and reevaluate her blood pressure at that point.  She can continue to follow with her primary care for this issue.        Follow up in in 1 month(s)    Mellissa Lauren MD  8/7/2020 11:23 AM

## 2020-08-07 NOTE — ASSESSMENT & PLAN NOTE
Hypertensive today at 160.  We did discuss at that this may be attributable to the steroid.  We will plan to let her come off steroids and reevaluate her blood pressure at that point.  She can continue to follow with her primary care for this issue.

## 2020-08-07 NOTE — ASSESSMENT & PLAN NOTE
Patient has significant history of eosinophilic asthma, given her FEV1 and symptomatology she has severe persistent asthma.  She continues on her prednisone taper.  She is currently reducing to 20 mg and then to 10 mg.  I would like her to continue this taper until she is off.  She is also currently on fluticasone-salmeterol 250/50 and budesonide intranasally.  She is well controlled on this regimen but cannot stay on prednisone long-term.  She has only ever been on an inhaled corticosteroid for the last month.  She has never been tried off of such as systemic steroids with only inhaled corticosteroid for maintenance therapy.  We discussed the addition of anti-IL-5 inhibitors.  We discussed that those may be appropriate in the long run, but at this point she needs a trial purely on inhaled corticosteroids/LABA.  We discussed increasing her fluticasone salmeterol dose to 500/50 mcg twice daily.  She should come off the prednisone.  We will also plan to start Singulair 10 mg daily.  We will plan to see how this regimen goes. If not controlled, then would need to restart prednisone and then consider anti-IL5 therapy at that point.    Plan to get repeat spirometry now as she appears to be controlled.  We will also get CBC with differential and IgE level.

## 2020-08-07 NOTE — LETTER
[facility] PULMONOLOGY  [address] [facility] [place] [address]  [phone]  Dept: [phone]  August 7, 2020     Lizeth Montoya CNP  [address] [facility] [place] [address]    Patient: Tamiko Tiwari   YOB: 1975   Date of Visit: 8/7/2020       To:  Lizeth Montoya CNP    Our mutual patient, Tamiko Tiwari, was seen in my office on 8/7/2020. Below are my notes.     Mellissa Lauren MD  8/7/2020 11:27 AM  Signed        Pulmonary Initial Encounter  Primary Care Physician: LIZETH MONTOYA CNP    Chief Complaint:  Chief Complaint   Patient presents with   • Consult     Saw pulmonologist at [place]. Pt states trouble breathing and states she was dx with eosinophilic asthma.        HPI:  Tamiko Tiwari is a 45 y.o. female who presents for recent diagnosis of eosinophilic asthma.  Patient states that symptoms started up to a year ago.  But have really increased over the last 8 months.  She states that the frequent cough is the worst, and was also coughing up green sputum.  Both her primary care and ENT worked her up for chronic sinusitis and gave her multiple rounds of steroids and antibiotics.  She would improve on said steroids, but then relapse.  At the height of her symptoms she was so short of breath that she could hardly walk from the bed to the bathroom.  At the beginning of July she saw a pulmonologist at [place] who diagnosed her with eosinophilic asthma based on high peripheral eosinophils, and FeNO of 60, and PFT showing obstruction.  Her FEV1 was 1.01 L, 33% predicted.  Her her FEV1/FVC ratio was less than the lower limit of normal suggesting a component of restriction likely secondary to air trapping.  She did have a robust bronchodilator response.  Prior to this patient had only ever been on albuterol nebulizers and inhalers.  She was then started on a prolonged prednisone taper in addition to fluticasone salmeterol 250/50 twice daily in addition to budesonide intranasally.  She has  been slowly tapering down this prednisone dose and is supposed to start 20 mg tomorrow.  There was discussion about adding anti-IL-5 inhibitors.  She otherwise feels very well controlled currently.  Her asthma control test score was a 25 out of 25.  She has not had to use her rescue inhaler since starting the prednisone again.  Prior to this she was having frequent nocturnal awakenings, using her albuterol about every 4 hours, and having excessive coughing fits with significant sputum production.     Patient denies any known symptoms of asthma as a child.  She states that in 2017 she moved from [place].  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  She denies any changes in their home.  She denies changes in detergents.  They have new sheets, comforter, and bed.  She denies any improvement when they go on vacation.  Her family does have a dog at home and did get a new dog in 2017 when they moved.  They did previously have dogs at home and she has not had any problems or known allergies to a dog.  She denies ever being hospitalized for shortness of breath she has needed 4-5 courses of prednisone over the past year.    The following have been reviewed and updated as appropriate in this visit:  Allergies  Meds  Problems  Med Hx  Surg Hx  Fam Hx         Allergies   Allergen Reactions   • Pollen Extracts      Runny nose, congestion     Current Outpatient Medications   Medication Sig Dispense Refill   • budesonide (PULMICORT) 0.5 mg/2 mL nebulizer solution Add 1 respule to 8 ounces saline and irrigate each side of nose twice daily as directed.     • predniSONE 10 mg tablet Take 40 mg a day for 2 weeks, then 30 mg a day for 2 weeks, then 20 mg a day for 2 weeks, then 10 mg a day for 2 weeks and then contact your pulmonologist     • ipratropium-albuteroL (DUO-NEB) 0.5-2.5 mg/3 mL nebulizer solution U 3 ML VIA NEB Q 6 H PRF [facility]     • esomeprazole (NexIUM) 40 mg capsule Take 1 capsule (40 mg total) by  mouth every morning before breakfast 30 capsule 0   • spironolactone (ALDACTONE) 25 mg tablet Take 1 tablet (25 mg total) by mouth daily 90 tablet 3   • sertraline (ZOLOFT) 100 mg tablet Take 1 tablet (100 mg total) by mouth daily 90 tablet 3   • levothyroxine (SYNTHROID, LEVOTHROID) 150 mcg tablet Take 1 tablet (150 mcg total) by mouth daily 90 tablet 3   • albuterol HFA (PROVENTIL HFA;VENTOLIN HFA) 90 mcg/actuation inhaler Inhale 2 puffs every 2 (two) hours as needed for wheezing (every 2-3 hours as needed for cough/wheezing) 1 Inhaler 1   • montelukast (SINGULAIR) 10 mg tablet Take 1 tablet (10 mg total) by mouth nightly 30 tablet 11   • fluticasone propion-salmeteroL (ADVAIR DISKUS) 500-50 mcg/dose diskus inhaler Inhale 1 puff 2 (two) times a day Rinse mouth with water after use. Do not swallow. 180 Inhaler 1   • cetirizine (ZyrTEC) 10 mg tablet Take 10 mg by mouth daily     • fluticasone propionate (FLONASE) 50 mcg/actuation nasal spray Administer 2 sprays into each nostril daily (Patient not taking: Reported on 2020 ) 32 g 2     No current facility-administered medications for this visit.      Past Medical History:   Diagnosis Date   • Allergic Pollen -    • Asthma    • Depression    • Hypertension    • Hypothyroid      Past Surgical History:   Procedure Laterality Date   •  SECTION       Family History   Problem Relation Age of Onset   • Prostate cancer Father    • Hypertension Mother    • Other Sister         Hysterectomy due to benign tumors    • Hypertension Brother    • Heart failure Maternal Grandmother    • Alcohol abuse Maternal Grandfather    • Pacemaker/AICD Paternal Grandmother    • Alcohol abuse Paternal Grandfather    • Tachycardia Sister    • Other Sister         Recent abnormal mammogram; benign   • Multiple sclerosis Father's Brother    • Cancer Mother's Sister         unsure of what type   • Other Mother's Brother    • Stroke Mother's Brother      Social History      Occupational History   • Occupation: Ambulatory Director     Employer: [employer]   Tobacco Use   • Smoking status: Never Smoker   • Smokeless tobacco: Never Used   Substance and Sexual Activity   • Alcohol use: Not Currently     Alcohol/week: 0.0 standard drinks   • Drug use: No   • Sexual activity: Yes     Partners: Male     Birth control/protection: None   Social History Narrative   • Not on file       Review of Systems   Constitutional: Negative for appetite change, fatigue and fever.        Feeling wired while on prednisone.    HENT: Positive for congestion and voice change. Negative for mouth sores and sore throat.    Eyes: Negative for pain and redness.   Respiratory:        SEE HPI   Cardiovascular: Negative for chest pain and leg swelling.   Gastrointestinal: Negative for abdominal pain, diarrhea, nausea and vomiting.   Endocrine: Negative for cold intolerance and heat intolerance.   Genitourinary: Negative for dysuria.   Musculoskeletal: Negative for arthralgias.   Skin: Negative for rash.   Neurological: Negative for weakness.   Hematological: Negative for adenopathy.   Psychiatric/Behavioral: The patient is hyperactive.        Objective   BP (!) 164/110 (BP Location: Right arm, Patient Position: Sitting, Cuff Size: Regular Adult)   Pulse 79   Wt 80.7 kg (178 lb)   SpO2 96%   BMI 28.73 kg/m²     Physical Exam  Vitals signs reviewed.   Constitutional:       General: She is not in acute distress.     Appearance: Normal appearance. She is obese. She is not diaphoretic.   HENT:      Head: Normocephalic and atraumatic.      Mouth/Throat:      Mouth: Mucous membranes are moist.      Pharynx: Oropharynx is clear. No oropharyngeal exudate or posterior oropharyngeal erythema.   Eyes:      General: No scleral icterus.     Conjunctiva/sclera: Conjunctivae normal.      Pupils: Pupils are equal, round, and reactive to light.   Neck:      Musculoskeletal: Neck supple.   Cardiovascular:      Rate and  Rhythm: Normal rate and regular rhythm.      Pulses: Normal pulses.      Heart sounds: No murmur. No gallop.    Pulmonary:      Effort: No respiratory distress.      Breath sounds: No wheezing, rhonchi or rales.   Musculoskeletal:         General: No swelling.      Right lower leg: No edema.      Left lower leg: No edema.   Skin:     General: Skin is warm and dry.   Neurological:      Mental Status: She is alert and oriented to person, place, and time.      Motor: No weakness.           Pulmonary Functions Testing Results:    Reviewed PFTs from [place] July 2020, FEV1 1.01L with 33% pred, FVC 2.61L, FEV1/FVC ratio decreased. --Very severe obstruction, FVC reduced likely due to air trapping. TLC normal. Significant bronchodilator response.     Imaging:Reviewed Prior CT from May 2020 without significant lung findings.    Lab Results   Component Value Date    GLUCOSE 93 08/09/2019    CALCIUM 9.3 08/09/2019     08/09/2019    K 4.0 08/09/2019    CO2 24 08/09/2019     08/09/2019    BUN 12 08/09/2019    CREATININE 0.68 08/09/2019    ANIONGAP 7 08/09/2019     Lab Results   Component Value Date    WBC 6.5 08/09/2019    HGB 11.6 08/09/2019    HCT 35.5 08/09/2019    MCV 80.9 (L) 08/09/2019     08/09/2019     No results found for: PT, INR, APTT     ASSESSMENT AND PLAN   Severe persistent asthma with acute exacerbation  Patient has significant history of eosinophilic asthma, given her FEV1 and symptomatology she has severe persistent asthma.  She continues on her prednisone taper.  She is currently reducing to 20 mg and then to 10 mg.  I would like her to continue this taper until she is off.  She is also currently on fluticasone-salmeterol 250/50 and budesonide intranasally.  She is well controlled on this regimen but cannot stay on prednisone long-term. I did review the [facility] notes with her. She has only ever been on an inhaled corticosteroid for the last month.  She has never been tried off of systemic  steroids with only inhaled corticosteroid for maintenance therapy.  We discussed the addition of anti-IL-5 inhibitors.  We discussed that those may be appropriate in the long run, but at this point she needs a trial purely on inhaled corticosteroids/LABA.  We discussed increasing her fluticasone salmeterol dose to 500/50 mcg twice daily.  She should come off the prednisone.  We will also plan to start Singulair 10 mg daily.  We will plan to see how this regimen goes. If not controlled, then would need to restart prednisone and then consider anti-IL5 therapy at that point.    Plan to get repeat spirometry now as she appears to be controlled.  We will also get CBC with differential and IgE level.    Essential hypertension  Hypertensive today at 160.  We did discuss at that this may be attributable to the steroid.  We will plan to let her come off steroids and reevaluate her blood pressure at that point.  She can continue to follow with her primary care for this issue.        Follow up in in 1 month(s)    Mellissa Lauren MD  8/7/2020 11:23 AM                   If you have questions, please do not hesitate to call me. I look forward to following your patient along with you.         Sincerely,        Mellissa Lauren MD        CC: No Recipients

## 2020-08-17 ENCOUNTER — NURSE TRIAGE (OUTPATIENT)
Dept: FAMILY MEDICINE | Facility: CLINIC | Age: 45
End: 2020-08-17

## 2020-08-17 DIAGNOSIS — Z20.828 CONTACT WITH OR EXPOSURE TO VIRAL DISEASE: Primary | ICD-10-CM

## 2020-08-17 NOTE — TELEPHONE ENCOUNTER
COVID-19 SCREENING ASSESSMENT     CRITERIA FOR TESTING  Yes to Any Symptoms or Asymptomatic Testing With Approved Criteria/ Agreement  What symptoms are you experiencing? 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 No  Are you a dialysis or current cancer patient? No  Are you currently pregnant? N/A     OTHER QUESTIONS  Are you a [employer] employee? Yes  Have you had close contact with a lab confirmed case of COVID-19 in the last 14 days? yes  Details on close contact: coworker    Reason for Disposition  • COVID-19 symptoms per CDC guidelines.    Protocols used: COVID-19  EMPLOYEE SCREENING

## 2020-08-18 ENCOUNTER — HOME MONITORING (OUTPATIENT)
Dept: FAMILY MEDICINE | Facility: CLINIC | Age: 45
End: 2020-08-18

## 2020-08-18 ENCOUNTER — APPOINTMENT (OUTPATIENT)
Dept: LAB | Facility: URGENT CARE | Age: 45
End: 2020-08-18
Payer: COMMERCIAL

## 2020-08-18 DIAGNOSIS — Z20.828 CONTACT WITH OR EXPOSURE TO VIRAL DISEASE: ICD-10-CM

## 2020-08-18 DIAGNOSIS — U07.1 COVID-19: Primary | ICD-10-CM

## 2020-08-18 LAB — SARS-COV-2 RNA RESP QL NAA+PROBE: POSITIVE

## 2020-08-18 PROCEDURE — 87635 SARS-COV-2 COVID-19 AMP PRB: CPT | Performed by: FAMILY MEDICINE

## 2020-08-18 PROCEDURE — C9803 HOPD COVID-19 SPEC COLLECT: HCPCS | Performed by: FAMILY MEDICINE

## 2020-08-18 NOTE — PROGRESS NOTES
Call placed to Tamiko Tiwari and offered COVID-19 Care Companion program.     Patient verbalized understanding of the program and has selected the MyChart Questionnaire option (the order has been placed).     The need for a thermometer and pulse oximeter was discussed with the patient. The patient has a thermometer, but not a pulse oximeter. An order was placed for the pulse oximeter.     Education Provided: How to Take Your Temperature  • Read the instructions that come with the thermometer before use.   • Record the reading.  A temperature of 100.4F or higher indicates a fever.  Education Provided: How to Use a Portable Oxygen Sensor  • A portable oxygen sensor is a tool that measures the oxygen level in the blood. The goal is to have your oxygen level 92% or higher.  This tool helps monitor your oxygen level and breathing.  • Read the instructions that come with the portable oxygen sensor before use.     Reviewed self-isolation information and when to seek emergent care. Verbalized understanding of safe home management of COVID-19 symptoms.     Date of symptom onset: 08/15/2020   Date of lab confirmed test:08/18/2020  Today's symptoms and vitals shown below.     Temperature: 36.7 °C (98 °F)  Are you taking any fever reducing medications?: No  Oxygen Saturation: (ordered pulse ox)  Shortness of Breath: No  Cough: No  Fatigue: Yes  Status of Fatigue: Unchanged  Have you been able to perform your daily activities without feeling fatigued?: Yes  Lack of Appetite: No  Diarrhea: Yes  Status of Diarrhea: Better  Vomiting: No  Abdominal Pain: No  Loss of Taste: No  Loss of Smell: No  Sinus congestion/runny nose: Yes  Status of Congestion/Runny Nose: Same  Sore throat: Yes  Status of Sore Throat: Better  Headache: Yes  Status of Headache: Same    Care Advice    Symptom Management: Fever Acetaminophen (i.e. Tylenol): Take 650 mg (two 325 mg pills) by mouth every 4-6 hours as needed. Each Regular Strength Tylenol pill  has 325 mg of acetaminophen. Max dose each day is 3,250 mg (10 Regular Strength pills a day). Another choice is to take 1,000 mg (two 500 mg pills) every 8 hours as needed. Each Extra Strength Tylenol pill has 500 mg of acetaminophen. Max dose each day is 3,000 mg (6 Extra Strength pills a day). Patient instructed to Read the package instructions for dosage, contraindications, and other important information. Problem list reviewed for contraindications to antipyretics. Ibuprofen (i.e. Motrin, Advil): Take 400 mg (two 200 mg pills) by mouth every 6 hours as needed. Caution - NSAIDS (i.e. Ibuprofen, Naproxen): Do not take nonsteroidal anti-inflammatory drugs (NSAIDs) if you have stomach problems, kidney disease, heart failure, or other contraindications to using this type of medication. Do not take NSAID medications for over 7 days without consulting your PCP. Do not take NSAID medications if you are pregnant. You may take this medicine with or without food. Taking it with food or milk may lessen the chance the drug will upset your stomach. Problem list reviewed for contraindications to antipyretics.    Symptom Management: Shortness of Breath n/a    Symptom Management: Cough Suggested use of OTC cough syrup - dextromethorphan 20-30mg by mouth every 6-8 hours as needed. Patient instructed to read the package instructions for dosage, contraindications, and other important information. Patient instructed on contraindications: Do not take dextromethorphan if you are taking a monoamine oxidase (MAO) inhibitor now or in the past 2 weeks. Examples of MAO inhibitors include isocarboxazid (Marplan), phenelzine (Nardil), selegiline (Eldepryl, Emsam, Zelapar), and tranylcypromine (Parnate). Do not take dextromethorphan if you are taking venlafaxine (Effexor). Reviewed patient medication list for above interactions with dextromethorphan and MAOs. Suggested guaifenesin 200-400 mg by mouth every 4 hours as needed. Suggested use of  hard candy.     Symptom Management: Fatigue Get plenty of rest and sleep. Balance rest with activity as tolerated.    Symptom Management: Appetite Try to stay hydrated with sips of water, sports drinks, electrolyte replacements like Pedialyte, 1/2 strength flat lemon-lime soda or ginger ale.   Eat small frequent meals.  Start with crackers, white bread, rice, mashed potatoes, cereal, apple sauce, bananas, etc.    Symptom Management: Vomiting n/a    Symptom Management: Diarrhea Drink more fluids, at least 8-10 cups daily. Drink water, sports drinks, electrolyte replacements like Pedialyte, and avoid caffeinated and alcoholic beverages. For nonbloody diarrhea, loperamide  2mg OTC tablets maximum 16mg/day for 2 days. Patient instructed to read the package instructions for dosage, contraindications, and other important information.    Symptom Management: Abdominal Pain n/a    Symptom Management: Headache Try to stay hydrated with sips of water, sports drinks, electrolyte replacements like Pedialyte, 1/2 strength flat lemon-lime soda or ginger ale.   , Acetaminophen (i.e. Tylenol): Take 650 mg (two 325 mg pills) by mouth every 4-6 hours as needed. Each Regular Strength Tylenol pill has 325 mg of acetaminophen. Max dose each day is 3,250 mg (10 Regular Strength pills a day). Another choice is to take 1,000 mg (two 500 mg pills) every 8 hours as needed. Each Extra Strength Tylenol pill has 500 mg of acetaminophen. Max dose each day is 3,000 mg (6 Extra Strength pills a day). Patient instructed to Read the package instructions for dosage, contraindications, and other important information. Problem list reviewed for contraindications. and Ibuprofen (i.e. Motrin, Advil): Take 400 mg (two 200 mg pills) by mouth every 6 hours as needed. Caution - NSAIDS (i.e. Ibuprofen, Naproxen): Do not take nonsteroidal anti-inflammatory drugs (NSAIDs) if you have stomach problems, kidney disease, heart failure, or other contraindications to  using this type of medication. Do not take NSAID medications for over 7 days without consulting your PCP. Do not take NSAID medications if you are pregnant. You may take this medicine with or without food. Taking it with food or milk may lessen the chance the drug will upset your stomach. Problem list reviewed for contraindications.    Symptom Management: Sore Throat Warm or cold liquids or foods for comfort.    Symptom Management: Sinus Congestion OTC antihistamine Benadryl 25 mg every 4-6 hours or Zyrtec 10 mg daily. Patient instructed to read the package instructions for dosage, contraindications, and other important information. and Decongestant phenylephrine 10 mg dose or Afrin for 72 hours maximum. Patient instructed to read the package instructions for dosage, contraindications, and other important information.  She had an onset date of 8/15/2020. Symptoms are mild and documented in flowsheet. Reviewed  employee guidelines with her for answering the questionnaire daily. Ordered a pulse ox for her as she was recently diagnosed with Asthma and is currently on Prednisone and inhalers. Care advice as above- she will reach out with any questions or concerns  Resume questionnaire tomorrow.    Time Spent 23 Minutes    Kimberlee Mathews RN

## 2020-08-25 ENCOUNTER — HOME MONITORING (OUTPATIENT)
Dept: FAMILY MEDICINE | Facility: CLINIC | Age: 45
End: 2020-08-25

## 2020-09-03 DIAGNOSIS — E03.9 ACQUIRED HYPOTHYROIDISM: ICD-10-CM

## 2020-09-03 RX ORDER — LEVOTHYROXINE SODIUM 150 UG/1
TABLET ORAL
Qty: 90 TABLET | Refills: 3 | Status: SHIPPED | OUTPATIENT
Start: 2020-09-03 | End: 2020-09-25 | Stop reason: DRUGHIGH

## 2020-09-08 NOTE — PROGRESS NOTES
AUDIOLOGY FOLLOW-UP VISIT NOTE: HEARING AID REPAIR AND/OR ADJUSTMENT  SUBJECTIVE  Tamiko Tiwari is a 45 y.o. year old female who was seen for a hearing aid repair/adjustment.   Tamiko was not accompanied by a significant other or caregiver.     *HA purchased Elsewhere*  Hearing Aid Make/Model/Serial#:  (programmed with [employer])               R:        Leo            Halo 2 i2400    [id number]              L:         Leo            Halo 2 i2400    [id number]              Receivers: #2 receivers              Custom ear molds:   [id number]              Warranty Ends: 6/16/2019      Date of last Audiological Exam:  10/4/2017    Patient complaints are as follows:  • Not hearing as well or as clearly in most situations, especially since everyone has been wearing the face masks for Covid-19.      OBJECTIVE  Otoscopy:   Right: clear canal, tympanic membrane visible  Left: clear canal, tympanic membrane visible      Right and left hearing aids examined.  -Cleaned amplification with appropriate disinfectant.  -Wax traps changed on both aids.   -Listening check revealed hearing aids to be of good sound quality.      Adjustment:  -Calibrated the feedback manager in both hearing aids.   -Increased the depth and strength of the Frequency Lowering in both hearing aids in both the Normal and the Crowd programs.   -Decreased the expansion by one step in the Normal program.     Patient reported increased comfort and satisfaction with sound quality following today's adjustments.         Counseling:  -Realistic expectations discussed based on the age and level of the hearing aid technology. Tamiko stated that she has a physical with there PCP next year where she will request a referral for an updated hearing test and new hearing aids may be considered at that time.             ASSESSMENT  -Hearing aid adjustment/modification, binaural.   -Type of hearing loss is sensorineural binaural.       PLAN  -Advised patient to schedule follow-up as needed for routine hearing aid maintenance and/or further programming adjustments.      EDUCATION  -Patient counseled as listed above.  -Patient communicated understanding and actively participated in plan of care.      SAW Lynn

## 2020-09-10 ENCOUNTER — OFFICE VISIT NO LOS (OUTPATIENT)
Dept: AUDIOLOGY | Facility: CLINIC | Age: 45
End: 2020-09-10
Payer: COMMERCIAL

## 2020-09-10 DIAGNOSIS — H90.3 SENSORINEURAL HEARING LOSS (SNHL) OF BOTH EARS: Primary | ICD-10-CM

## 2020-09-10 DIAGNOSIS — Z46.1 FITTING AND ADJUSTMENT OF HEARING AID: ICD-10-CM

## 2020-09-10 PROCEDURE — V5014 HEARING AID REPAIR/MODIFYING: HCPCS | Mod: NC | Performed by: AUDIOLOGIST

## 2020-09-11 ENCOUNTER — HOSPITAL ENCOUNTER (OUTPATIENT)
Dept: RESPIRATORY THERAPY | Facility: HOSPITAL | Age: 45
Discharge: HOME | End: 2020-09-11
Payer: COMMERCIAL

## 2020-09-11 DIAGNOSIS — J45.51 SEVERE PERSISTENT ASTHMA WITH ACUTE EXACERBATION: ICD-10-CM

## 2020-09-11 PROCEDURE — 94060 EVALUATION OF WHEEZING: CPT | Mod: 26 | Performed by: INTERNAL MEDICINE

## 2020-09-11 PROCEDURE — 94060 EVALUATION OF WHEEZING: CPT

## 2020-09-13 PROBLEM — Z13.220 SCREENING FOR HYPERLIPIDEMIA: Status: RESOLVED | Noted: 2018-05-14 | Resolved: 2020-09-13

## 2020-09-13 PROBLEM — Z00.00 ENCOUNTER FOR ROUTINE ADULT HEALTH EXAMINATION WITHOUT ABNORMAL FINDINGS: Status: RESOLVED | Noted: 2018-05-14 | Resolved: 2020-09-13

## 2020-09-13 PROBLEM — I10 ESSENTIAL HYPERTENSION: Chronic | Status: ACTIVE | Noted: 2018-05-14

## 2020-09-13 PROBLEM — E03.9 ACQUIRED HYPOTHYROIDISM: Chronic | Status: ACTIVE | Noted: 2018-05-14

## 2020-09-13 PROBLEM — E55.9 VITAMIN D DEFICIENCY: Chronic | Status: ACTIVE | Noted: 2018-06-18

## 2020-09-17 DIAGNOSIS — G47.00 INSOMNIA, UNSPECIFIED TYPE: ICD-10-CM

## 2020-09-18 RX ORDER — SERTRALINE HYDROCHLORIDE 100 MG/1
TABLET, FILM COATED ORAL
Qty: 90 TABLET | Refills: 3 | Status: SHIPPED | OUTPATIENT
Start: 2020-09-18 | End: 2021-06-30

## 2020-09-20 PROBLEM — J45.51 SEVERE PERSISTENT ASTHMA WITH ACUTE EXACERBATION: Chronic | Status: ACTIVE | Noted: 2020-08-07

## 2020-09-24 ENCOUNTER — OFFICE VISIT (OUTPATIENT)
Dept: FAMILY MEDICINE | Facility: CLINIC | Age: 45
End: 2020-09-24
Payer: COMMERCIAL

## 2020-09-24 ENCOUNTER — APPOINTMENT (OUTPATIENT)
Dept: LAB | Facility: CLINIC | Age: 45
End: 2020-09-24
Payer: COMMERCIAL

## 2020-09-24 VITALS
OXYGEN SATURATION: 96 % | HEART RATE: 89 BPM | WEIGHT: 177 LBS | TEMPERATURE: 98 F | SYSTOLIC BLOOD PRESSURE: 128 MMHG | DIASTOLIC BLOOD PRESSURE: 80 MMHG | BODY MASS INDEX: 28.45 KG/M2 | HEIGHT: 66 IN | RESPIRATION RATE: 16 BRPM

## 2020-09-24 DIAGNOSIS — E03.9 ACQUIRED HYPOTHYROIDISM: Chronic | ICD-10-CM

## 2020-09-24 DIAGNOSIS — N92.4 EXCESSIVE BLEEDING IN PREMENOPAUSAL PERIOD: ICD-10-CM

## 2020-09-24 DIAGNOSIS — F50.89 PICA: ICD-10-CM

## 2020-09-24 DIAGNOSIS — E61.1 IRON DEFICIENCY: ICD-10-CM

## 2020-09-24 DIAGNOSIS — H90.3 SENSORINEURAL HEARING LOSS (SNHL) OF BOTH EARS: Chronic | ICD-10-CM

## 2020-09-24 DIAGNOSIS — I10 ESSENTIAL HYPERTENSION: Chronic | ICD-10-CM

## 2020-09-24 DIAGNOSIS — Z00.00 WELLNESS EXAMINATION: Primary | ICD-10-CM

## 2020-09-24 DIAGNOSIS — Z23 NEED FOR VACCINATION: ICD-10-CM

## 2020-09-24 DIAGNOSIS — E55.9 VITAMIN D DEFICIENCY: Chronic | ICD-10-CM

## 2020-09-24 DIAGNOSIS — J45.51 SEVERE PERSISTENT ASTHMA WITH ACUTE EXACERBATION: ICD-10-CM

## 2020-09-24 DIAGNOSIS — Z12.31 ENCOUNTER FOR SCREENING MAMMOGRAM FOR MALIGNANT NEOPLASM OF BREAST: ICD-10-CM

## 2020-09-24 DIAGNOSIS — J45.51 SEVERE PERSISTENT ASTHMA WITH ACUTE EXACERBATION: Chronic | ICD-10-CM

## 2020-09-24 LAB
ANION GAP SERPL CALC-SCNC: 8 MMOL/L (ref 3–11)
BASOPHILS # BLD AUTO: 0.1 10*3/UL
BASOPHILS NFR BLD AUTO: 1 % (ref 0–2)
BUN SERPL-MCNC: 11 MG/DL (ref 7–25)
CALCIUM SERPL-MCNC: 9.1 MG/DL (ref 8.6–10.3)
CHLORIDE SERPL-SCNC: 103 MMOL/L (ref 98–107)
CO2 SERPL-SCNC: 27 MMOL/L (ref 21–32)
CREAT SERPL-MCNC: 0.65 MG/DL (ref 0.6–1.1)
EOSINOPHIL # BLD AUTO: 0.1 10*3/UL
EOSINOPHIL NFR BLD AUTO: 2 % (ref 0–3)
ERYTHROCYTE [DISTWIDTH] IN BLOOD BY AUTOMATED COUNT: 16.3 % (ref 11.5–14)
FERRITIN SERPL-MCNC: 4.7 NG/ML (ref 11–307)
GFR SERPL CREATININE-BSD FRML MDRD: 106 ML/MIN/1.73M*2
GLUCOSE SERPL-MCNC: 90 MG/DL (ref 70–105)
HCT VFR BLD AUTO: 34.7 % (ref 34–45)
HGB BLD-MCNC: 11.1 G/DL (ref 11.5–15.5)
HYPOCHROMIA PRESENCE IN BLOOD, ANALYZER: ABNORMAL
IRON SATN MFR SERPL: 4 % (ref 13–50)
IRON SERPL-MCNC: 21 UG/DL (ref 50–175)
LYMPHOCYTES # BLD AUTO: 2.1 10*3/UL
LYMPHOCYTES NFR BLD AUTO: 24 % (ref 11–47)
MCH RBC QN AUTO: 25.4 PG (ref 28–33)
MCHC RBC AUTO-ENTMCNC: 32 G/DL (ref 32–36)
MCV RBC AUTO: 79.4 FL (ref 81–97)
MICROCYTOSIS PRESENCE IN BLOOD, ANALYZER: ABNORMAL
MONOCYTES # BLD AUTO: 0.8 10*3/UL
MONOCYTES NFR BLD AUTO: 10 % (ref 3–11)
NEUTROPHILS # BLD AUTO: 5.4 10*3/UL
NEUTROPHILS NFR BLD AUTO: 63 % (ref 41–81)
PLATELET # BLD AUTO: 402 10*3/UL (ref 140–350)
PMV BLD AUTO: 7.8 FL (ref 6.9–10.8)
POTASSIUM SERPL-SCNC: 4.1 MMOL/L (ref 3.5–5.1)
RBC # BLD AUTO: 4.37 10*6/ΜL (ref 3.7–5.3)
SODIUM SERPL-SCNC: 138 MMOL/L (ref 135–145)
TIBC SERPL-MCNC: 495 UG/DL (ref 250–450)
TSH SERPL DL<=0.05 MIU/L-ACNC: 0.09 UIU/ML (ref 0.34–4.82)
UIBC SERPL-MCNC: 474 UG/DL (ref 155–355)
WBC # BLD AUTO: 8.6 10*3/UL (ref 4.5–10.5)

## 2020-09-24 PROCEDURE — 82306 VITAMIN D 25 HYDROXY: CPT | Performed by: FAMILY MEDICINE

## 2020-09-24 PROCEDURE — 82785 ASSAY OF IGE: CPT | Performed by: INTERNAL MEDICINE

## 2020-09-24 PROCEDURE — 90471 IMMUNIZATION ADMIN: CPT | Performed by: FAMILY MEDICINE

## 2020-09-24 PROCEDURE — 83540 ASSAY OF IRON: CPT | Performed by: FAMILY MEDICINE

## 2020-09-24 PROCEDURE — 99396 PREV VISIT EST AGE 40-64: CPT | Mod: 25 | Performed by: FAMILY MEDICINE

## 2020-09-24 PROCEDURE — 36415 COLL VENOUS BLD VENIPUNCTURE: CPT | Performed by: FAMILY MEDICINE

## 2020-09-24 PROCEDURE — 84443 ASSAY THYROID STIM HORMONE: CPT | Performed by: FAMILY MEDICINE

## 2020-09-24 PROCEDURE — 82728 ASSAY OF FERRITIN: CPT | Performed by: FAMILY MEDICINE

## 2020-09-24 PROCEDURE — 80048 BASIC METABOLIC PNL TOTAL CA: CPT | Performed by: FAMILY MEDICINE

## 2020-09-24 PROCEDURE — 83550 IRON BINDING TEST: CPT | Performed by: FAMILY MEDICINE

## 2020-09-24 PROCEDURE — 85025 COMPLETE CBC W/AUTO DIFF WBC: CPT | Performed by: INTERNAL MEDICINE

## 2020-09-24 PROCEDURE — 90732 PPSV23 VACC 2 YRS+ SUBQ/IM: CPT | Performed by: FAMILY MEDICINE

## 2020-09-24 RX ORDER — SPIRONOLACTONE 25 MG/1
25 TABLET ORAL
COMMUNITY
Start: 2019-09-06 | End: 2020-10-02

## 2020-09-24 ASSESSMENT — PAIN SCALES - GENERAL: PAINLEVEL: 0-NO PAIN

## 2020-09-24 NOTE — PATIENT INSTRUCTIONS
Female Wellness    Adopting a healthy lifestyle and getting preventive care can go a long way to promote health and wellness. Talk with your health care provider about what schedule of regular examinations is right for you. This is a good chance for you to check in with your provider about disease prevention and staying healthy.    In between checkups, there are plenty of things you can do on your own. Experts have done a lot of research about which lifestyle changes and preventive measures are most likely to keep you healthy. Ask your health care provider for more information.    Weight and diet  Eat a healthy diet  · Be sure to include plenty of vegetables, fruits, low-fat dairy products, and lean protein.  · Do not eat a lot of foods high in solid fats, added sugars, or salt.  · Get regular exercise. This is one of the most important things you can do for your health.  ? Most adults should exercise for at least 150 minutes each week. The exercise should increase your heart rate and make you sweat (moderate-intensity exercise).  ? Most adults should also do strengthening exercises at least twice a week. This is in addition to the moderate-intensity exercise.    Maintain a healthy weight  · Body mass index (BMI) is a measurement that can be used to identify possible weight problems. It estimates body fat based on height and weight. Your health care provider can help determine your BMI and help you achieve or maintain a healthy weight.  · For females 20 years of age and older:  ? A BMI below 18.5 is considered underweight.  ? A BMI of 18.5 to 24.9 is normal.  ? A BMI of 25 to 29.9 is considered overweight.  ? A BMI of 30 and above is considered obese.    Your BMI today was:  Body mass index is 28.57 kg/m².     Watch levels of cholesterol and blood lipids  · You should start having your blood tested for lipids and cholesterol at 20 years of age, then have this test every 5 years.  · You may need to have your  cholesterol levels checked more often if:  ? Your lipid or cholesterol levels are high.  ? You are older than 50 years of age.  ? You are at high risk for heart disease.    Cancer screening  Lung Cancer  · Lung cancer screening is recommended for adults 55-80 years old who are at high risk for lung cancer because of a history of smoking.  · A yearly low-dose CT scan of the lungs is recommended for people who:  ? Currently smoke.  ? Have quit within the past 15 years.  ? Have at least a 30-pack-year history of smoking. A pack year is smoking an average of one pack of cigarettes a day for 1 year.  · Yearly screening should continue until it has been 15 years since you quit.  · Yearly screening should stop if you develop a health problem that would prevent you from having lung cancer treatment.    Breast Cancer  · Practice breast self-awareness. This means understanding how your breasts normally appear and feel.  · It also means doing regular breast self-exams. Let your health care provider know about any changes, no matter how small.  · If you are in your 20s or 30s, you should have a clinical breast exam (CBE) by a health care provider every 1-3 years as part of a regular health exam.  · If you are 40 or older, have a CBE every year. Also consider having a breast X-ray (mammogram) every year.  · If you have a family history of breast cancer, talk to your health care provider about genetic screening.  · If you are at high risk for breast cancer, talk to your health care provider about having an MRI and a mammogram every year.  · Breast cancer gene (BRCA) assessment is recommended for women who have family members with BRCA-related cancers. BRCA-related cancers include:  ? Breast.  ? Ovarian.  ? Tubal.  ? Peritoneal cancers.  · Results of the assessment will determine the need for genetic counseling and BRCA1 and BRCA2 testing.    Cervical Cancer  Your health care provider may recommend that you be screened regularly  for cancer of the pelvic organs (ovaries, uterus, and vagina). This screening involves a pelvic examination, including checking for microscopic changes to the surface of your cervix (Pap test). You may be encouraged to have this screening done every 3 years, beginning at age 21.  · For women ages 30-65, health care providers may recommend pelvic exams and Pap testing every 3 years, or they may recommend the Pap and pelvic exam, combined with testing for human papilloma virus (HPV), every 5 years. Some types of HPV increase your risk of cervical cancer. Testing for HPV may also be done on women of any age with unclear Pap test results.  · Other health care providers may not recommend any screening for nonpregnant women who are considered low risk for pelvic cancer and who do not have symptoms. Ask your health care provider if a screening pelvic exam is right for you.    Colorectal Cancer  · This type of cancer can be detected and often prevented.  · Routine colorectal cancer screening usually begins at 50 years of age and continues through 75 years of age.  · Your health care provider may recommend screening at an earlier age if you have risk factors for colon cancer.  · Your health care provider may also recommend using home test kits to check for hidden blood in the stool if you are considered normal or low risk.  These tests are called FIT or Cologuard tests.  · A small camera at the end of a tube can be used to examine your colon directly (colonoscopy). This is done to check for the earliest forms of colorectal cancer.  · Routine screening usually begins at age 50.  · Direct examination of the colon should be repeated every 5-10 years through 75 years of age. However, you may need to be screened more often if early forms of precancerous polyps or small growths are found.    Skin Cancer  · Check your skin from head to toe regularly.  · Tell your health care provider about any new moles or changes in moles,  especially if there is a change in a mole's shape or color.  · Also tell your health care provider if you have a mole that is larger than the size of a pencil eraser.  · Always use sunscreen. Apply sunscreen liberally and repeatedly throughout the day.  · Protect yourself by wearing long sleeves, pants, a wide-brimmed hat, and sunglasses whenever you are outside.    Heart disease, diabetes, and high blood pressure  · High blood pressure causes heart disease and increases the risk of stroke. High blood pressure is more likely to develop in:  ? People who have blood pressure in the high end of the normal range (130-139/85-89 mm Hg).  ? People who are overweight or obese.  ? People who are .  · If you are 18-39 years of age, have your blood pressure checked every 3-5 years. If you are 40 years of age or older, have your blood pressure checked every year. You should have your blood pressure measured twice--once when you are at a hospital or clinic, and once when you are not at a hospital or clinic. Record the average of the two measurements. To check your blood pressure when you are not at a hospital or clinic, you can use:  ? An automated blood pressure machine at a pharmacy.  ? A home blood pressure monitor.  · If you are between 55 years and 79 years old, ask your health care provider if you should take aspirin to prevent strokes.  · Have regular diabetes screenings. This involves taking a blood sample to check your fasting blood sugar level.  ? If you are at a normal weight and have a low risk for diabetes, have this test once every three years after 45 years of age.  ? If you are overweight and have a high risk for diabetes, consider being tested at a younger age or more often.    Hepatitis C  · Blood testing is recommended for:  ? Everyone born from 1945 through 1965.  ? Anyone with known risk factors for hepatitis C.    Sexually transmitted infections (STIs)  · You should be screened for sexually  transmitted infections (STIs) including gonorrhea and chlamydia if:  ? You are sexually active and are younger than 24 years of age.  ? You are older than 24 years of age and your health care provider tells you that you are at risk for this type of infection.  ? Your sexual activity has changed since you were last screened and you are at an increased risk for chlamydia or gonorrhea. Ask your health care provider if you are at risk.  Pregnancy  · If you are premenopausal and you may become pregnant, ask your health care provider about preconception counseling.  · If you may become pregnant, take 400 to 800 micrograms (mcg) of folic acid every day.  · If you want to prevent pregnancy, talk to your health care provider about birth control (contraception).  ·   Osteoporosis and menopause  · Osteoporosis is a disease in which the bones lose minerals and strength with aging. This can result in serious bone fractures. Your risk for osteoporosis can be identified using a bone density scan.  · If you are 65 years of age or older, or if you are at risk for osteoporosis and fractures, ask your health care provider if you should be screened.  · Ask your health care provider whether you should take a calcium or vitamin D supplement to lower your risk for osteoporosis.      Follow these instructions at home:  · Schedule regular health, dental, and eye exams.  · Stay current with your immunizations.  · Do not use any tobacco products including cigarettes, chewing tobacco, electronic cigarettes or drugs.  · If you are pregnant, do not drink alcohol.  · If you are breastfeeding, limit how much and how often you drink alcohol.  · Limit alcohol intake to no more than 1 drink per day for nonpregnant women. One drink equals 12 ounces of beer, 5 ounces of wine, or 1½ ounces of hard liquor.  · Tell your health care provider if you often feel depressed.  · Tell your health care provider if you have ever been abused or do not feel safe at  home.        [employer]vier Interactive Patient Education © 2018 [employer]

## 2020-09-24 NOTE — PROGRESS NOTES
SUBJECTIVE:    Chief Complaint   Patient presents with   • Annual Exam         Tamiko Tiwari is a 45 y.o. female presenting for an annual exam.  She has asthma managed by pulmonology.  She was evaluated at [facility] and was diagnosed with eosinophilic asthma.  She has been off of her oral steroids since August and is doing quite well on Singulair, Advair 500/50 and Flonase.  Her exercise is currently unlimited, although she does admit to being a bit sedentary.    Her only other concern is that she has been having some heavy menstrual cycles since being on the steroids and she has also noticed some pica with ice chewing.  I recommended we check her iron levels today.  She is iron deficient with a ferritin of 4.7 and iron level 21.  I have recommended initiation of ferrous gluconate and vitamin C 500 daily and have her see gynecology.    Labs were completed her recent work screening and her lipid and glucose were acceptable.  She is due for thyroid testing and a BMP due to her hypertension.  She is also due for vitamin D level, as her last one in 2018 was quite low.    Patient Active Problem List   Diagnosis   • Acquired hypothyroidism   • Essential hypertension   • Left shoulder pain   • Vitamin D deficiency   • Cough   • Sensorineural hearing loss (SNHL) of both ears   • Severe persistent asthma with acute exacerbation   • Excessive bleeding in premenopausal period   • Iron deficiency       Outpatient Medications Prior to Visit   Medication Sig Dispense Refill   • spironolactone (ALDACTONE) 25 mg tablet Take 25 mg by mouth     • sertraline (ZOLOFT) 100 mg tablet TAKE 1 TABLET(100 MG) BY MOUTH DAILY 90 tablet 3   • budesonide (PULMICORT) 0.5 mg/2 mL nebulizer solution Add 1 respule to 8 ounces saline and irrigate each side of nose twice daily as directed.     • predniSONE 10 mg tablet Take 40 mg a day for 2 weeks, then 30 mg a day for 2 weeks, then 20 mg a day for 2 weeks, then 10 mg a day for 2 weeks and then  contact your pulmonologist     • montelukast (SINGULAIR) 10 mg tablet Take 1 tablet (10 mg total) by mouth nightly 30 tablet 11   • fluticasone propion-salmeteroL (ADVAIR DISKUS) 500-50 mcg/dose diskus inhaler Inhale 1 puff 2 (two) times a day Rinse mouth with water after use. Do not swallow. 180 Inhaler 1   • ipratropium-albuteroL (DUO-NEB) 0.5-2.5 mg/3 mL nebulizer solution U 3 ML VIA NEB Q 6 H PRF WHZ     • cetirizine (ZyrTEC) 10 mg tablet Take 10 mg by mouth daily     • esomeprazole (NexIUM) 40 mg capsule Take 1 capsule (40 mg total) by mouth every morning before breakfast 30 capsule 0   • albuterol HFA (PROVENTIL HFA;VENTOLIN HFA) 90 mcg/actuation inhaler Inhale 2 puffs every 2 (two) hours as needed for wheezing (every 2-3 hours as needed for cough/wheezing) 1 Inhaler 1   • levothyroxine (SYNTHROID, LEVOTHROID) 150 mcg tablet TAKE 1 TABLET(150 MCG) BY MOUTH DAILY 90 tablet 3   • fluticasone propionate (FLONASE) 50 mcg/actuation nasal spray Administer 2 sprays into each nostril daily (Patient not taking: Reported on 2020 ) 32 g 2     No facility-administered medications prior to visit.        Family Status   Relation Name Status   • Father Rudy Alive, age 76y   • Mother Izabela Alive, age 75y   • Sister Macy Alive, age 53y   • Brother Vinnie Alive, age 51y   • Maternal GM Lissy  at age 60s   • Maternal GF Henrik  at age 80s   • Paternal ARLETH Thomas Alive, age 96y   • Paternal GF Rudy  at age 90s   • Sister Lucia Alive, age 49y   • Pat Uncle  (Not Specified)   • Mat Aunt Pat (Not Specified)   • Mat Uncle  (Not Specified)   • Mat Uncle Shaggy (Not Specified)   • Son  Alive       Family History   Problem Relation Age of Onset   • Prostate cancer Father    • Hypertension Mother    • Other Sister         Hysterectomy due to benign tumors    • Hypertension Brother    • Heart failure Maternal Grandmother    • Alcohol abuse Maternal Grandfather    • Pacemaker/AICD Paternal Grandmother    • Alcohol  "abuse Paternal Grandfather    • Tachycardia Sister    • Multiple sclerosis Father's Brother    • Cancer Mother's Sister         unsure of what type   • Other Mother's Brother    • Stroke Mother's Brother    • Asthma Son    • Eczema Son          The patient's past medical history, medications, allergies, family history and social history were reviewed in her electronic medical record at today's visit.      REVIEW OF SYSTEMS:  Constitutional: Weight up 7 pounds  HEENT:  No change in vision or hearing.  Has worn hearing aids for years.  Pulmonary: No dyspnea on exertion, no wheezing, no shortness of breath, feels asthma is currently well controlled.  Cardiovascular: No exercise intolerance, no chest pain, no diaphoresis, no edema  Gastrointestinal: No abdominal pain, no change in bowel habits, no significant GERD  :  The patient denies frequent UTI, nocturia, frequency or incomplete voiding.  No dyspareunia. Menses: Heavy, somewhat irregular since being on prednisone  Skin/Integumentary: No abnormal skin lesions noted.  Neurologic: No chronic headaches.  Psychiatric: Denies depression or anxiety.  Some sleep disturbance due to leg cramping.  Musculoskeletal: Positive for recent cramping in her foot and legs for about the last week.    OBJECTIVE:   The patient appears well, in NAD. Mood and affect appropriate.  /80 (BP Location: Left arm, Patient Position: Sitting, Cuff Size: Long Adult)   Pulse 89   Temp 36.7 °C (98 °F) (Temporal)   Resp 16   Ht 1.676 m (5' 6\")   Wt 80.3 kg (177 lb)   LMP 09/03/2020   SpO2 96%   BMI 28.57 kg/m²    HEENT: Eyes without gross abnormality; Ears normal; TM's are clear. Throat and pharynx normal. Teeth in good repair.  Neck supple. No adenopathy or thyromegaly.  Lungs: Clear, good air entry, no wheezes, rhonchi or rales.   Heart: S1 and S2 normal, no murmurs, regular rate and rhythm. Carotids are without bruits bilaterally.  Breasts:  Symmetrical, without mass,nipple " discharge or axillary adenopathy.  Abdomen: Soft without tenderness, guarding, mass or hepatosplenomegaly.  No audible renal bruit.  Extremities: No edema, no varicosities. Distal pulses intact.  Skin: I note only benign skin findings. No unusual rashes or suspicious skin lesions noted. Nails appear normal.      Labs:    Recent Results (from the past 48 hour(s))   Basic metabolic panel Blood, Venous    Collection Time: 09/24/20  7:47 AM   Result Value Ref Range    Sodium 138 135 - 145 mmol/L    Potassium 4.1 3.5 - 5.1 mmol/L    Chloride 103 98 - 107 mmol/L    CO2 27 21 - 32 mmol/L    BUN 11 7 - 25 mg/dL    Creatinine 0.65 0.60 - 1.10 mg/dL    Glucose 90 70 - 105 mg/dL    Calcium 9.1 8.6 - 10.3 mg/dL    Anion Gap 8 3 - 11 mmol/L    eGFR 106 >60 mL/min/1.73m*2   Thyroid Stimulating Hormone, Ultrasensitive Blood, Venous    Collection Time: 09/24/20  7:47 AM   Result Value Ref Range    TSH 0.088 (L) 0.340 - 4.820 uIU/ml   Iron and TIBC Blood, Venous    Collection Time: 09/24/20  7:47 AM   Result Value Ref Range    Iron 21 (L) 50 - 175 ug/dL    TIBC 495 (H) 250 - 450 ug/dL    Iron Saturation 4 (L) 13 - 50 %    UIBC 474 (H) 155 - 355 ug/dL   Ferritin Blood, Venous    Collection Time: 09/24/20  7:47 AM   Result Value Ref Range    Ferritin 4.7 (L) 11.0 - 307.0 ng/mL   CBC w/auto differential Blood, Venous    Collection Time: 09/24/20  7:47 AM   Result Value Ref Range    WBC 8.6 4.5 - 10.5 10*3/uL    RBC 4.37 3.70 - 5.30 10*6/µL    Hemoglobin 11.1 (L) 11.5 - 15.5 g/dL    Hematocrit 34.7 34.0 - 45.0 %    MCV 79.4 (L) 81.0 - 97.0 fL    MCH 25.4 (L) 28.0 - 33.0 pg    MCHC 32.0 32.0 - 36.0 g/dL    RDW 16.3 (H) 11.5 - 14.0 %    Platelets 402 (H) 140 - 350 10*3/uL    MPV 7.8 6.9 - 10.8 fL    Neutrophils% 63 41 - 81 %    Lymphocytes% 24 11 - 47 %    Monocytes% 10 3 - 11 %    Eosinophils% 2 0 - 3 %    Basophils% 1 0 - 2 %    Neutrophils Absolute 5.40 10*3/uL    Lymphocytes Absolute 2.10 10*3/uL    Monocytes Absolute 0.80 10*3/uL     Eosinophils Absolute 0.10 10*3/uL    Basophils Absolute 0.10 10*3/uL    Microcytosis 1+     Hypochromia 1+         ASSESSMENT:    Diagnosis Plan   1. Wellness examination     2. Severe persistent asthma with acute exacerbation     3. Acquired hypothyroidism  Thyroid Stimulating Hormone, Ultrasensitive Blood, Venous    levothyroxine (SYNTHROID, LEVOTHROID) 137 mcg tablet    Thyroid Stimulating Hormone, Ultrasensitive Blood, Venous   4. Essential hypertension  Basic metabolic panel Blood, Venous   5. Encounter for screening mammogram for malignant neoplasm of breast  BI screening mammogram bilateral w lesley   6. Need for vaccination  Pneumococcal polysaccharide vaccine 23-valent greater than or equal to 3yo subcutaneous/IM   7. Vitamin D deficiency  25-Hydroxyvitamin D2 and D3, serum Blood, Venous   8. Pica  Iron panel Blood, Venous    Iron and TIBC Blood, Venous    Ferritin Blood, Venous   9. Excessive bleeding in premenopausal period  Iron panel Blood, Venous    Iron and TIBC Blood, Venous    Ferritin Blood, Venous   10. Sensorineural hearing loss (SNHL) of both ears     11. Iron deficiency  CBC w/auto differential Blood, Venous    Iron panel Blood, Venous        PLAN:   The patient is advised to continue current medications and continue current healthy lifestyle patterns.   She will increase her exercise, she is currently sedentary.    Due to asthma history, she is due for Pneumovax which was given today.  Flu vaccination will be given at work.  Her mammogram is due and I have ordered that at [place] [facility], due to her prior abnormals.    She is iron deficient and has menorrhagia.  She will start ferrous gluconate and vitamin C, see gynecology for further evaluation of her menorrhagia.  I will recheck her CBC and iron panel in 3 months.    TSH is over suppressed.  I am reducing her levothyroxine to 137 mcg daily and she will return for a recheck of her TSH in 3 months.    Return in about 1 year (around 9/24/2021)  for 30 MIN- PHYSICAL.    Jacinda Duran MD

## 2020-09-25 ENCOUNTER — PATIENT MESSAGE (OUTPATIENT)
Dept: FAMILY MEDICINE | Facility: CLINIC | Age: 45
End: 2020-09-25

## 2020-09-25 DIAGNOSIS — E61.1 IRON DEFICIENCY: ICD-10-CM

## 2020-09-25 DIAGNOSIS — N92.4 EXCESSIVE BLEEDING IN PREMENOPAUSAL PERIOD: Primary | ICD-10-CM

## 2020-09-25 RX ORDER — LEVOTHYROXINE SODIUM 137 UG/1
137 TABLET ORAL DAILY
Qty: 90 TABLET | Refills: 3 | Status: SHIPPED | OUTPATIENT
Start: 2020-09-25 | End: 2021-09-26

## 2020-09-28 LAB — IGE SERPL-ACNC: 46.8 KU/L

## 2020-09-29 LAB
25(OH)D2 SERPL-MCNC: <4 NG/ML
25(OH)D3 SERPL-MCNC: 35 NG/ML
25(OH)D3+25(OH)D2 SERPL-MCNC: 35 NG/ML

## 2020-10-01 DIAGNOSIS — I10 ESSENTIAL HYPERTENSION: Primary | Chronic | ICD-10-CM

## 2020-10-02 RX ORDER — SPIRONOLACTONE 25 MG/1
TABLET ORAL
Qty: 90 TABLET | Refills: 3 | Status: SHIPPED | OUTPATIENT
Start: 2020-10-02 | End: 2021-09-27 | Stop reason: SDUPTHER

## 2020-11-13 ENCOUNTER — TELEPHONE (OUTPATIENT)
Dept: FAMILY MEDICINE | Facility: CLINIC | Age: 45
End: 2020-11-13

## 2020-11-13 DIAGNOSIS — Z01.10 ENCOUNTER FOR HEARING EXAMINATION, UNSPECIFIED WHETHER ABNORMAL FINDINGS: Primary | ICD-10-CM

## 2020-11-13 NOTE — TELEPHONE ENCOUNTER
Caller would like to discuss (a) referral Writer has advised caller of a callback from within 24 hours.    Patient: Tamiko Tiwari    Caller Name (Last and first, relation/role): joaquina    Name of Facility: [address] (audiology)    Callback Number: [phone]    Best Availability: anytime    Fax Number: [phone]    Additional Info: calling to obtain referral for patient to get hearing test done for insurance purposes. 11/20/2020 is scheduled appointment for hearing    Did you confirm the message with the caller: Yes    Is it okay that the nurse communicates your response through [employer]? No

## 2020-11-18 NOTE — PROGRESS NOTES
Addendum 11/24/2020:  · Tamiko contacted the clinic and stated that she would like to move forward with a new hearing aid purchase.   · She would like to stick with battery operated devices with the same size 13 battery that she currently uses.   · An order was placed for the Phonak Audeo P90-13T in chestnut brown with size 1M receivers and custom earmolds.   · Fitting will be scheduled in about 2 weeks.       AUDIOLOGY VISIT NOTE: HEARING EVALUATION  SUBJECTIVE  Tamiko Tiwari is a 45 y.o. year old female who was seen for a hearing evaluation. Tamiko was referred by Jacinda Duran MD.   Tamiko was not accompanied by a significant other or caregiver.     *HA purchased Elsewhere*  Hearing Aid Make/Model/Serial#:  (programmed with [employer])               R:        Leo            Halo 2 i2400    [id number]              L:         Leo            Halo 2 i2400    [id number]              Receivers: #2 receivers              Custom ear molds:   [id number]   Wax protection: HearClear   Battery: 13              Warranty Ends: 6/16/2019      Date of last Audiological Exam:  10/4/2017    Case History:   • Hearing loss: Longstanding in both ears, no sudden changes reported. Tamiko reports that her hearing loss came on after she had her son in 2008. She is unsure if it was related to changes in hormones or a possible side effect of complications she had with the epidural (it had sent a shock through her body, down into her leg).   • Hearing aid history or complaint: She feels the hearing aids just aren't working as well for her as she would like. Possibly interested in new hearing aids. She had Phonak hearing aids previously but when she purchased this pair, it seemed like they had all the bells and whistles but she thinks that she actually preferred her Phonak ones more. She finds that she still struggles in meetings at work, in the car, and at [employer].   • Tinnitus: None  • Noise exposure:  None  • Ear problems/diseases: None  • Dizziness: None  • Family history of hearing loss: None  • Head injury or TBI: None  • Other reported problems: None  • Demonstrates difficulty understanding others during evaluation: Some difficulty unaided  • Clinical Barriers to Care: None       OBJECTIVE   Otoscopy:          Right: clear ear canal, tympanic membrane visible         Left: clear ear canal, tympanic membrane visible    Tympanometry:          Right: Type A - normal static admittance.         Left: Type A - normal static admittance.    Acoustic Reflexes:   Ipsilateral: 500 1000 2000 4000Hz         Right: 85 NR NR 95         Left: NR NR NR 95    Pure Tones:          Right: Mild steeply sloping to profound sensorineural hearing loss 500-8000Hz.         Left: Mild steeply sloping to profound sensorineural hearing loss 500-8000Hz.    Comparison to previous hearing test: 10/4/2017         Right: No significant decreases in thresholds.          Left: No significant decreases in thresholds.     Word Recognition Score (WRS):         Right:  80% @ 80dB masked         Left:   84% @ 80dB masked    Reliability: GOOD      Current Hearing Aids:  • Hearing aids were cleaned, checked, and found to be in goodworking condition.  • Hearing aids were adjusted to the current audiogram, feedback manager was calibrated.     Hearing Aid Order:  • Discussed amplification options, including types, size, and technology.   o Discussed ROB, BTE and ITE. ROB hearing aids with custom earmolds were recommended.   o Discussed batteries vs. rechargeable.   o Patient expressed interest in rechargeable.   • Needs Assessment for Assistive Listening Device(s): none at this time  • Discussed risks of impression procedure with patient.  Patient consented to impression procedure. Impressions taken of both ears without complication.  Otoscopy was unchanged following impressions.  • The following devices were discussed: Phonak Rewind Meeo P90-R in size 1M  receivers with custom earmolds.     Tamiko stated that she would like to consider her options and discuss with her . She will contact the clinic should she decide to move forward with the purchase.          ASSESSMENT  • Bilateral sensorineural hearing loss   • Fitting and adjustment of hearing aids     PLAN  • Continued use of current hearing aids.  • Patient will contact the clinic should she decide to place a new hearing aid order.   • Audiological evaluation in 2 years or as needed.  • Wear hearing protection in noisy environments.  • Patient is released back to Primary Care Provider (PCP).    EDUCATION  • Discussed results and recommendations.  • Patient verbally demonstrated an understanding of the information presented and actively participated in the treatment plan.        Sarah Epps, AUD

## 2020-11-20 ENCOUNTER — OFFICE VISIT NO LOS (OUTPATIENT)
Dept: AUDIOLOGY | Facility: CLINIC | Age: 45
End: 2020-11-20
Payer: COMMERCIAL

## 2020-11-20 DIAGNOSIS — H90.3 SENSORINEURAL HEARING LOSS (SNHL) OF BOTH EARS: Primary | Chronic | ICD-10-CM

## 2020-11-20 DIAGNOSIS — Z46.1 FITTING AND ADJUSTMENT OF HEARING AID: ICD-10-CM

## 2020-11-20 PROCEDURE — 92550 TYMPANOMETRY & REFLEX THRESH: CPT | Performed by: AUDIOLOGIST

## 2020-11-20 PROCEDURE — 92557 COMPREHENSIVE HEARING TEST: CPT | Performed by: AUDIOLOGIST

## 2020-11-20 PROCEDURE — V5010 ASSESSMENT FOR HEARING AID: HCPCS | Mod: NC | Performed by: AUDIOLOGIST

## 2020-11-20 NOTE — LETTER
[facility]  [address] [place] [address] [id number] [phone]  Dept: [phone]  11/20/20    Jacinda Duran MD  [address] [place] [id number]      Dear Dr. Duran,    Thank you for referring your patient, Tamiko Tiwari, to receive care in my office. I have enclosed a summary of the care provided to Tamiko on 11/20/20.    Please contact me with any questions you may have regarding the visit.    Sincerely,         Sarah Epps, SAW  [address] [place] [address] [id number] [phone]    CC: No Recipients

## 2020-12-09 NOTE — PROGRESS NOTES
AUDIOLOGY FOLLOW-UP VISIT NOTE: HEARING AID FITTING/DISPENSING  SUBJECTIVE  Tamiko Tiwari is a 45 y.o. year old female who was seen for a new hearing aid fitting.   Tamiko was not accompanied by a significant other or caregiver to today's appointment.    Hearing aid make/model/serial numbers:   R: Phonak Audeo P90-13T [id number] (Bluetooth ear)   L: Phonak Audeo P90-13T [id number]  : size 1M  Earmolds: acrylic cshell    R: [id number]    L: [id number]    Remake warranty: 3/31/2021  Wax Protection: Cerustop  Battery: 13              Warranty Ends: 2/29/2024   Trial Period Ends: 3/10/2021      Date of last Audiological Exam:  11/20/2020     OBJECTIVE:     OTOSCOPY:        Right: clear canal, visible tympanic membrane        Left:  clear canal, visible tympanic membrane      ADJUSTMENT        Factory settings or first fit set to: Phonak Adaptive Digital        Prescription changed as follows:        -Adaption level set as Experienced at 110% of target        -Set indicator tones        -Ran feedback         -Reset datalogging to begin today        -Verified tolerance and output        -Paired hearing aids to patient's cellphone        -Activated VC: Right raises, left lowers        -Speech mapping and MPO completed using Verifit; targets met bilaterally        -Based on speech mapping, sound recover was activated for 3000Hz and above       Memories set as follows:          Memory 1:    Universal Everyday           Memory 2:    T-Coil + hugo    **Request advanced remake of cshells with power receivers. We are pushing th output limits of the medium strength.     VERIFICATION   The patient reported good subjective benefit and loudnessdiscomfort was denied.     REAL EAR  Verification of an appropriate acoustic response was obtained using Real Ear measurements and NAL/NL2 target; a good match to target was obtained.      COUNSELING   · Use and care of hearing aids reviewed.  · Realistic expectations  discussed.  · Patient demonstrates understanding of hearing aid features.  · Patient was able to demonstrate the following:  · hearing aid insertion and removal  · manipulation of volume control  · Written reference materials and an initial supply of cleaning tools were provided to the patient.     Tamiko Tiwari agreed to the total hearing aid cost of $5400.00 and a non-refundable down payment of $250.00 was made at this time.   Tamiko acknowledges the trial period ends 3/10/2021 and that no changes to the hearing aid(s) can be made after that date. Patient will be billed remaining balance after trial period date has past.   Warranty End Date for Repair/Replacement endin2024    ASSESSMENT:  · Fitting / adjusting of hearing aids.  · Type of hearing loss: bilateral sensorineural hearing loss  · Hearing aids are appropriate for patient trial.   · While the patient does show improvement with amplification, she will always have word understanding problems due to the nature of hearing loss; the problems will be greater in background noise than in quiet listening situations.    PLAN:   · Full-time use of amplification, hearing conservation, and re-evaluation as needed.   · We will schedule post fitting follow-up appointment once the new earmolds arrive to address any concerns noted with the new hearing aids and check adjustment to amplification.     EDUCATION:   · Patient counseled per above.  · Patient verbally demonstrated an understanding of the information presented and actively participated in the treatment plan.  · Hand-outs given to patient include:  · Hearing Aids Care and Maintenance  · Vendor specific hearing aid educational materials    SAW Lynn

## 2020-12-10 ENCOUNTER — OFFICE VISIT NO LOS (OUTPATIENT)
Dept: AUDIOLOGY | Facility: CLINIC | Age: 45
End: 2020-12-10
Payer: COMMERCIAL

## 2020-12-10 DIAGNOSIS — H90.3 SENSORINEURAL HEARING LOSS (SNHL) OF BOTH EARS: Primary | Chronic | ICD-10-CM

## 2020-12-10 DIAGNOSIS — Z46.1 FITTING AND ADJUSTMENT OF HEARING AID: ICD-10-CM

## 2020-12-10 PROCEDURE — V5011 HEARING AID FITTING/CHECKING: HCPCS | Performed by: AUDIOLOGIST

## 2020-12-10 PROCEDURE — V5261 HEARING AID, DIGIT, BIN, BTE: HCPCS | Performed by: AUDIOLOGIST

## 2021-01-20 ENCOUNTER — OFFICE VISIT (OUTPATIENT)
Dept: URGENT CARE | Facility: URGENT CARE | Age: 46
End: 2021-01-20
Payer: COMMERCIAL

## 2021-01-20 VITALS
OXYGEN SATURATION: 95 % | BODY MASS INDEX: 28.12 KG/M2 | RESPIRATION RATE: 16 BRPM | HEART RATE: 98 BPM | WEIGHT: 175 LBS | SYSTOLIC BLOOD PRESSURE: 148 MMHG | TEMPERATURE: 99.5 F | DIASTOLIC BLOOD PRESSURE: 86 MMHG | HEIGHT: 66 IN

## 2021-01-20 DIAGNOSIS — J01.00 ACUTE NON-RECURRENT MAXILLARY SINUSITIS: Primary | ICD-10-CM

## 2021-01-20 PROCEDURE — 99213 OFFICE O/P EST LOW 20 MIN: CPT | Performed by: NURSE PRACTITIONER

## 2021-01-20 RX ORDER — AMOXICILLIN AND CLAVULANATE POTASSIUM 875; 125 MG/1; MG/1
1 TABLET, FILM COATED ORAL 2 TIMES DAILY
Qty: 14 TABLET | Refills: 0 | Status: SHIPPED | OUTPATIENT
Start: 2021-01-20 | End: 2021-01-27

## 2021-01-20 RX ORDER — NORGESTIMATE AND ETHINYL ESTRADIOL 0.25-0.035
1 KIT ORAL DAILY
COMMUNITY
Start: 2020-11-30 | End: 2021-10-06 | Stop reason: ALTCHOICE

## 2021-01-20 ASSESSMENT — PAIN SCALES - GENERAL: PAINLEVEL: 0-NO PAIN

## 2021-01-20 NOTE — PROGRESS NOTES
Subjective     Tamiko Tiwari is a 45 y.o. female who presents for acute bacterial sinusitis     HPI  Patient presenting for acute sinus pain and pressure for the past 28 days.  Patient has purulent nasal discharge and frontal pressure/headache.  Has had trouble sleeping at nighttime due to pressure.  No vision changes or balance problems.  Patient is tolerating oral intake without issue.  No GI problems    The following have been reviewed and updated as appropriate in this visit:  Allergies   Allergen Reactions   • Pollen Extracts      Runny nose, congestion     Current Outpatient Medications   Medication Sig Dispense Refill   • Estarylla 0.25-35 mg-mcg per tablet Take 1 tablet by mouth daily     • spironolactone (ALDACTONE) 25 mg tablet TAKE 1 TABLET(25 MG) BY MOUTH DAILY 90 tablet 3   • levothyroxine (SYNTHROID, LEVOTHROID) 137 mcg tablet Take 137 mcg by mouth daily 90 tablet 3   • sertraline (ZOLOFT) 100 mg tablet TAKE 1 TABLET(100 MG) BY MOUTH DAILY 90 tablet 3   • budesonide (PULMICORT) 0.5 mg/2 mL nebulizer solution Add 1 respule to 8 ounces saline and irrigate each side of nose twice daily as directed.     • montelukast (SINGULAIR) 10 mg tablet Take 1 tablet (10 mg total) by mouth nightly 30 tablet 11   • fluticasone propion-salmeteroL (ADVAIR DISKUS) 500-50 mcg/dose diskus inhaler Inhale 1 puff 2 (two) times a day Rinse mouth with water after use. Do not swallow. 180 Inhaler 1   • ipratropium-albuteroL (DUO-NEB) 0.5-2.5 mg/3 mL nebulizer solution U 3 ML VIA NEB Q 6 H PRF WHZ     • cetirizine (ZyrTEC) 10 mg tablet Take 10 mg by mouth daily     • albuterol HFA (PROVENTIL HFA;VENTOLIN HFA) 90 mcg/actuation inhaler Inhale 2 puffs every 2 (two) hours as needed for wheezing (every 2-3 hours as needed for cough/wheezing) 1 Inhaler 1   • amoxicillin-pot clavulanate (AUGMENTIN) 875-125 mg per tablet Take 1 tablet by mouth 2 (two) times a day for 7 days 14 tablet 0   • predniSONE 10 mg tablet Take 40 mg a day for 2  "weeks, then 30 mg a day for 2 weeks, then 20 mg a day for 2 weeks, then 10 mg a day for 2 weeks and then contact your pulmonologist     • fluticasone propionate (FLONASE) 50 mcg/actuation nasal spray Administer 2 sprays into each nostril daily (Patient not taking: Reported on 2020 ) 32 g 2   • esomeprazole (NexIUM) 40 mg capsule Take 1 capsule (40 mg total) by mouth every morning before breakfast (Patient not taking: Reported on 2021 ) 30 capsule 0     No current facility-administered medications for this visit.      Past Medical History:   Diagnosis Date   • Allergic Pollen -    • Depression    • Hypertension    • Hypothyroid    • Iron deficiency 2020   • Severe persistent asthma with acute exacerbation 2020    Eosinophilic asthma; [place]     Past Surgical History:   Procedure Laterality Date   •  SECTION           Review of Systems    Review of Systems   Constitutional: Positive for chills and fever.   HENT: Positive for ear pain and sinus pressure.    Eyes: Negative for discharge.   Respiratory: Negative for shortness of breath.    Gastrointestinal: Negative for abdominal pain.     Objective   /86   Pulse 98   Temp 37.5 °C (99.5 °F)   Resp 16   Ht 1.676 m (5' 6\")   Wt 79.4 kg (175 lb)   SpO2 95%   BMI 28.25 kg/m²     Physical Exam  Constitutional: She appears well-developed.   HENT:   Right Ear: Tympanic membrane normal.   Left Ear: Tympanic membrane normal.   Nose: Mucosal edema, rhinorrhea and sinus tenderness present.   Eyes: Conjunctivae are normal. Pupils are equal, round, and reactive to light.   Cardiovascular: Normal rate, regular rhythm and normal heart sounds.    Pulmonary/Chest: Effort normal and breath sounds normal.         Assessment/Plan   Diagnoses and all orders for this visit:    Acute non-recurrent maxillary sinusitis  -     amoxicillin-pot clavulanate (AUGMENTIN) 875-125 mg per tablet; Take 1 tablet by mouth 2 (two) times a day for 7 " days Jennifer G Franke, CNP     Clinical history and signs/symptoms are suggestive of an acute bacterial infection.  Take antibiotic as prescribed. Use over the counter tylenol or ibuprofen for fever and discomfort.  Sudafed during the day, benedryl at nighttime unless contraindicated.  Educated patient regarding potential blood pressure elevation with Sudafed.  May use saline rinses for congestion, hot steaming showers, and humidifier at nighttime.  Return to urgent care should you develop shortness of breath, chest pain with cough, inability to tolerate fluids, or worseing symptoms or concerns.  Patient agrees with plan,  verbalized understanding, and denies any further questions or concerns at this time.

## 2021-02-04 ENCOUNTER — CLINICAL SUPPORT (OUTPATIENT)
Dept: FAMILY MEDICINE | Facility: CLINIC | Age: 46
End: 2021-02-04

## 2021-02-04 DIAGNOSIS — Z23 ENCOUNTER FOR VACCINATION: Primary | ICD-10-CM

## 2021-02-18 NOTE — PROGRESS NOTES
AUDIOLOGY FOLLOW-UP VISIT NOTE: HEARING AID REPAIR AND/OR ADJUSTMENT  SUBJECTIVE  Tamiko Tiwari is a 45 y.o. year old female who was seen for fitting of new/stronger receivers and to assess her progress with the new hearing aids.  Tamiko was not accompanied to today's appointment.     Hearing aid make/model/serial numbers:              R: Phonak Audeo P90-13T     [id number] (Bluetooth ear)              L: Phonak Audeo P90-13T     [id number]  : size 1P  Earmolds: acrylic cshell                          R: [id number]                          L: [id number]                          Remake warranty: 3/31/2021  Wax Protection: Cerustop  Battery: 13              Warranty Ends: 2/29/2024              Trial Period Ends: 3/10/2021      Date of last Audiological Exam:  11/20/2020     ·  tells her that she needs more clarity.   · Overall though, Tamiko states that she has been very happy with the new hearing aids; she enjoys the hands-free bluetooth and she can hear the  at [employer] much better without having to adjust them.         OBJECTIVE  Otoscopy:   Right: clear canal, tympanic membrane visible  Left: clear canal, tympanic membrane visible      Right and left hearing aids examined.  · Cleaned amplification with appropriate disinfectant.  · New/stronger receivers were coupled to both hearing aids.   · Listening check revealed hearing aids to be of good sound quality.      Adjustment:    · Updated hearing aid acoustics to new receivers/earmolds.  · Calibrated feedback manager.  · Increased speech enhancer by two steps in the Calm Situation program.   · Overall gain increase by 3dB.   · Decreased Sound Recover to 3000Hz and above.      Patient reported increased comfort and satisfaction with sound quality following today's adjustments.         Counseling:  · Use and care reviewed.  · Realistic expectations discussed.  · Tamiko was reminded that her trial period ends 3/10/2020 and that no returns/exchanges  can be made after that time.   · Tamiko was advised that she is still eligible to receive her year supply of batteries (two boxes of 48-count size 13) that were included with her hearing aid purchase.             Original M power receivers/earmolds will be returned to the  as part of the advanced remake.      ASSESSMENT  · Hearing aid adjustment/modification, binaural.   · Type of hearing loss is sensorineural binaural.      PLAN  · Follow-up as needed for routine hearing aid maintenance and/or further programming adjustments.   · Tamiko was advised that she is still eligible to receive her year supply of batteries (two boxes of 48-count size 13) that were included with her hearing aid purchase.      EDUCATION  · Patient counseled as listed above.  · Patient communicated understanding and actively participated in plan of care.        SAW Lynn

## 2021-02-22 ENCOUNTER — OFFICE VISIT NO LOS (OUTPATIENT)
Dept: AUDIOLOGY | Facility: CLINIC | Age: 46
End: 2021-02-22
Payer: COMMERCIAL

## 2021-02-22 DIAGNOSIS — H90.3 SENSORINEURAL HEARING LOSS (SNHL) OF BOTH EARS: Primary | Chronic | ICD-10-CM

## 2021-02-22 DIAGNOSIS — Z46.1 FITTING AND ADJUSTMENT OF HEARING AID: ICD-10-CM

## 2021-02-22 PROCEDURE — V5014 HEARING AID REPAIR/MODIFYING: HCPCS | Mod: NC | Performed by: AUDIOLOGIST

## 2021-02-25 ENCOUNTER — CLINICAL SUPPORT (OUTPATIENT)
Dept: FAMILY MEDICINE | Facility: CLINIC | Age: 46
End: 2021-02-25

## 2021-02-25 DIAGNOSIS — Z23 ENCOUNTER FOR VACCINATION: Primary | ICD-10-CM

## 2021-03-11 ENCOUNTER — DOCUMENTATION (OUTPATIENT)
Dept: AUDIOLOGY | Facility: CLINIC | Age: 46
End: 2021-03-11

## 2021-03-11 NOTE — PROGRESS NOTES
Tamiko Tiwari returned my call this morning, advising that she would like us to proceed with mailing her 2 boxes of 48-count size 13 batteries to her home address at no charge. ** These were included in her hearing aid purchase.    Hearing aid make/model/serial numbers:              R: Phonak Audeo P90-13T     [id number] (Bluetooth ear)              L:  Phonak Audeo P90-13T     [id number]  : size 1P  Earmolds: acrylic cshell                          R: [id number]                          L: [id number]                          Remake warranty: 3/31/2021  Wax Protection: Cerustop  Battery: 13              Warranty Ends: 2/29/2024              Trial Period Ends: 3/10/2021      Date of last Audiological Exam:  11/20/2020    Mailing was accomplished 3/11/2021.      Carrie Esquivel  Audiology Assistant

## 2021-06-20 ENCOUNTER — HOSPITAL ENCOUNTER (EMERGENCY)
Facility: HOSPITAL | Age: 46
Discharge: HOME | End: 2021-06-20
Attending: EMERGENCY MEDICINE
Payer: COMMERCIAL

## 2021-06-20 ENCOUNTER — APPOINTMENT (OUTPATIENT)
Dept: CT IMAGING | Facility: HOSPITAL | Age: 46
End: 2021-06-20
Payer: COMMERCIAL

## 2021-06-20 ENCOUNTER — APPOINTMENT (OUTPATIENT)
Dept: ULTRASOUND IMAGING | Facility: HOSPITAL | Age: 46
End: 2021-06-20
Payer: COMMERCIAL

## 2021-06-20 VITALS
WEIGHT: 169.75 LBS | SYSTOLIC BLOOD PRESSURE: 118 MMHG | RESPIRATION RATE: 16 BRPM | HEART RATE: 88 BPM | BODY MASS INDEX: 27.28 KG/M2 | TEMPERATURE: 98.1 F | OXYGEN SATURATION: 93 % | HEIGHT: 66 IN | DIASTOLIC BLOOD PRESSURE: 70 MMHG

## 2021-06-20 DIAGNOSIS — R10.9 ABDOMINAL PAIN: Primary | ICD-10-CM

## 2021-06-20 DIAGNOSIS — N83.201 RIGHT OVARIAN CYST: ICD-10-CM

## 2021-06-20 LAB
ALBUMIN SERPL-MCNC: 3.4 G/DL (ref 3.5–5.3)
ALP SERPL-CCNC: 73 U/L (ref 39–100)
ALT SERPL-CCNC: 12 U/L (ref 7–52)
ANION GAP SERPL CALC-SCNC: 9 MMOL/L (ref 3–11)
AST SERPL-CCNC: 18 U/L
BACTERIA #/AREA URNS AUTO: NORMAL /HPF
BASOPHILS # BLD AUTO: 0.1 10*3/UL
BASOPHILS NFR BLD AUTO: 1 % (ref 0–2)
BILIRUB SERPL-MCNC: 0.35 MG/DL (ref 0.2–1.4)
BILIRUB UR QL STRIP.AUTO: NEGATIVE
BUN SERPL-MCNC: 15 MG/DL (ref 7–25)
CALCIUM ALBUM COR SERPL-MCNC: 8.5 MG/DL (ref 8.6–10.3)
CALCIUM SERPL-MCNC: 8 MG/DL (ref 8.6–10.3)
CHLORIDE SERPL-SCNC: 108 MMOL/L (ref 98–107)
CLARITY UR: CLEAR
CO2 SERPL-SCNC: 21 MMOL/L (ref 21–32)
COLOR UR: ABNORMAL
CREAT SERPL-MCNC: 0.61 MG/DL (ref 0.6–1.1)
EOSINOPHIL # BLD AUTO: 1 10*3/UL
EOSINOPHIL NFR BLD AUTO: 13 % (ref 0–3)
ERYTHROCYTE [DISTWIDTH] IN BLOOD BY AUTOMATED COUNT: 14.9 % (ref 11.5–14)
GFR SERPL CREATININE-BSD FRML MDRD: 108 ML/MIN/1.73M*2
GLUCOSE SERPL-MCNC: 102 MG/DL (ref 70–105)
GLUCOSE UR STRIP.AUTO-MCNC: NEGATIVE MG/DL
HCT VFR BLD AUTO: 36.6 % (ref 34–45)
HGB BLD-MCNC: 11.9 G/DL (ref 11.5–15.5)
HGB UR QL STRIP.AUTO: ABNORMAL
KETONES UR STRIP.AUTO-MCNC: NEGATIVE MG/DL
LEUKOCYTE ESTERASE UR QL STRIP: NEGATIVE
LYMPHOCYTES # BLD AUTO: 1.7 10*3/UL
LYMPHOCYTES NFR BLD AUTO: 22 % (ref 11–47)
MCH RBC QN AUTO: 27.7 PG (ref 28–33)
MCHC RBC AUTO-ENTMCNC: 32.6 G/DL (ref 32–36)
MCV RBC AUTO: 85 FL (ref 81–97)
MONOCYTES # BLD AUTO: 0.6 10*3/UL
MONOCYTES NFR BLD AUTO: 8 % (ref 3–11)
NEUTROPHILS # BLD AUTO: 4.2 10*3/UL
NEUTROPHILS NFR BLD AUTO: 55 % (ref 41–81)
NITRITE UR QL STRIP.AUTO: NEGATIVE
PH UR STRIP.AUTO: 7 PH
PLATELET # BLD AUTO: 301 10*3/UL (ref 140–350)
PMV BLD AUTO: 8.4 FL (ref 6.9–10.8)
POTASSIUM SERPL-SCNC: 4.2 MMOL/L (ref 3.5–5.1)
PROT SERPL-MCNC: 6.2 G/DL (ref 6–8.3)
PROT UR STRIP.AUTO-MCNC: NEGATIVE MG/DL
RBC # BLD AUTO: 4.31 10*6/ΜL (ref 3.7–5.3)
RBC #/AREA URNS AUTO: NEGATIVE /HPF
SODIUM SERPL-SCNC: 138 MMOL/L (ref 135–145)
SP GR UR STRIP.AUTO: 1.02 (ref 1–1.03)
SQUAMOUS #/AREA URNS AUTO: NORMAL /HPF
UROBILINOGEN UR STRIP.AUTO-MCNC: <2 E.U./DL
WBC # BLD AUTO: 7.5 10*3/UL (ref 4.5–10.5)
WBC #/AREA URNS AUTO: NORMAL /HPF

## 2021-06-20 PROCEDURE — 96374 THER/PROPH/DIAG INJ IV PUSH: CPT

## 2021-06-20 PROCEDURE — 36415 COLL VENOUS BLD VENIPUNCTURE: CPT | Performed by: EMERGENCY MEDICINE

## 2021-06-20 PROCEDURE — 2550000100 HC RX 255: Performed by: EMERGENCY MEDICINE

## 2021-06-20 PROCEDURE — 93976 VASCULAR STUDY: CPT

## 2021-06-20 PROCEDURE — 96376 TX/PRO/DX INJ SAME DRUG ADON: CPT

## 2021-06-20 PROCEDURE — 80053 COMPREHEN METABOLIC PANEL: CPT | Performed by: EMERGENCY MEDICINE

## 2021-06-20 PROCEDURE — 74177 CT ABD & PELVIS W/CONTRAST: CPT | Mod: ME

## 2021-06-20 PROCEDURE — 99284 EMERGENCY DEPT VISIT MOD MDM: CPT | Performed by: EMERGENCY MEDICINE

## 2021-06-20 PROCEDURE — 6360000200 HC RX 636 W HCPCS (ALT 250 FOR IP): Performed by: EMERGENCY MEDICINE

## 2021-06-20 PROCEDURE — 2580000300 HC RX 258: Performed by: EMERGENCY MEDICINE

## 2021-06-20 PROCEDURE — P9612 CATHETERIZE FOR URINE SPEC: HCPCS

## 2021-06-20 PROCEDURE — 96375 TX/PRO/DX INJ NEW DRUG ADDON: CPT

## 2021-06-20 PROCEDURE — 85025 COMPLETE CBC W/AUTO DIFF WBC: CPT | Performed by: EMERGENCY MEDICINE

## 2021-06-20 PROCEDURE — 81001 URINALYSIS AUTO W/SCOPE: CPT | Performed by: EMERGENCY MEDICINE

## 2021-06-20 RX ORDER — ONDANSETRON HYDROCHLORIDE 2 MG/ML
4 INJECTION, SOLUTION INTRAVENOUS ONCE
Status: COMPLETED | OUTPATIENT
Start: 2021-06-20 | End: 2021-06-20

## 2021-06-20 RX ORDER — IOPAMIDOL 755 MG/ML
105 INJECTION, SOLUTION INTRAVASCULAR ONCE
Status: COMPLETED | OUTPATIENT
Start: 2021-06-20 | End: 2021-06-20

## 2021-06-20 RX ORDER — SODIUM CHLORIDE 9 MG/ML
1000 INJECTION, SOLUTION INTRAVENOUS ONCE
Status: COMPLETED | OUTPATIENT
Start: 2021-06-20 | End: 2021-06-20

## 2021-06-20 RX ORDER — SODIUM CHLORIDE 9 MG/ML
100 INJECTION, SOLUTION INTRAVENOUS CONTINUOUS
Status: DISCONTINUED | OUTPATIENT
Start: 2021-06-20 | End: 2021-06-20 | Stop reason: HOSPADM

## 2021-06-20 RX ORDER — HYDROCODONE BITARTRATE AND ACETAMINOPHEN 5; 325 MG/1; MG/1
1 TABLET ORAL EVERY 6 HOURS PRN
Qty: 15 TABLET | Refills: 0 | Status: SHIPPED | OUTPATIENT
Start: 2021-06-20 | End: 2021-10-06 | Stop reason: ALTCHOICE

## 2021-06-20 RX ORDER — MORPHINE SULFATE 4 MG/ML
4 INJECTION, SOLUTION INTRAMUSCULAR; INTRAVENOUS
Status: DISCONTINUED | OUTPATIENT
Start: 2021-06-20 | End: 2021-06-20 | Stop reason: HOSPADM

## 2021-06-20 RX ORDER — ONDANSETRON 4 MG/1
4 TABLET, ORALLY DISINTEGRATING ORAL EVERY 8 HOURS PRN
Qty: 20 TABLET | Refills: 0 | Status: SHIPPED | OUTPATIENT
Start: 2021-06-20 | End: 2021-06-27

## 2021-06-20 RX ADMIN — SODIUM CHLORIDE 1000 ML: 9 INJECTION, SOLUTION INTRAVENOUS at 08:03

## 2021-06-20 RX ADMIN — ONDANSETRON 4 MG: 2 INJECTION INTRAMUSCULAR; INTRAVENOUS at 08:03

## 2021-06-20 RX ADMIN — MORPHINE SULFATE 4 MG: 4 INJECTION, SOLUTION INTRAMUSCULAR; INTRAVENOUS at 08:46

## 2021-06-20 RX ADMIN — IOPAMIDOL 105 ML: 755 INJECTION, SOLUTION INTRAVENOUS at 09:20

## 2021-06-20 RX ADMIN — MORPHINE SULFATE 4 MG: 4 INJECTION, SOLUTION INTRAMUSCULAR; INTRAVENOUS at 08:03

## 2021-06-20 RX ADMIN — SODIUM CHLORIDE 100 ML/HR: 9 INJECTION, SOLUTION INTRAVENOUS at 09:17

## 2021-06-20 NOTE — ED PROVIDER NOTES
Abdominal Pain (right flank pain that radiates around to rlq aea. sudden onset this morning)        HPI:    Patient is a 46 y.o. female presenting to the emergency department with right lower quadrant abdominal pain which started suddenly this morning about an hour prior to arrival. Her pain radiates into her right flank. Pain is 9/10 and is constant. Pain is sharp in nature. Denies any alleviating or exacerbating factors. She reports associated nausea. No vomiting or urinary symptoms. No fever. She last ate yesterday. Patient has a history of hypertension. She has had prior  section but no other abdominal surgeries. She is fully vaccinated against COVID-19.    Past Medical History:   Diagnosis Date   • Allergic Pollen -    • Depression    • Hypertension    • Hypothyroid    • Iron deficiency 2020   • Severe persistent asthma with acute exacerbation 2020    Eosinophilic asthma; Peterson       Past Surgical History:   Procedure Laterality Date   •  SECTION         Social History     Socioeconomic History   • Marital status:      Spouse name: Carl   • Number of children: 1   • Years of education: Not on file   • Highest education level: Bachelor's degree (e.g., BA, AB, BS)   Occupational History   • Occupation: Ambulatory Director     Employer: [employer]   Tobacco Use   • Smoking status: Never Smoker   • Smokeless tobacco: Never Used   Substance and Sexual Activity   • Alcohol use: Not Currently     Alcohol/week: 0.0 standard drinks   • Drug use: No   • Sexual activity: Yes     Partners: Male     Birth control/protection: Condom Male   Other Topics Concern   • Not on file   Social History Narrative   • Not on file     Social Determinants of Health     Financial Resource Strain: Low Risk    • Difficulty of Paying Living Expenses: Not very hard   Food Insecurity: No Food Insecurity   • Worried About Running Out of Food in the Last Year: Never true   • Ran Out of Food in the  Last Year: Never true   Transportation Needs: No Transportation Needs   • Lack of Transportation (Medical): No   • Lack of Transportation (Non-Medical): No   Physical Activity: Inactive   • Days of Exercise per Week: 0 days   • Minutes of Exercise per Session: 0 min   Stress: Stress Concern Present   • Feeling of Stress : To some extent   Social Connections: Unknown   • Frequency of Communication with Friends and Family: Once a week   • Frequency of Social Gatherings with Friends and Family: Not on file   • Attends [employer] Services: More than 4 times per year   • Active Member of Clubs or Organizations: Yes   • Attends Club or Organization Meetings: More than 4 times per year   • Marital Status:    Intimate Partner Violence:    • Fear of Current or Ex-Partner:    • Emotionally Abused:    • Physically Abused:    • Sexually Abused:        Family History   Problem Relation Age of Onset   • Prostate cancer Father    • Hypertension Mother    • Other Sister         Hysterectomy due to benign tumors    • Hypertension Brother    • Heart failure Maternal Grandmother    • Alcohol abuse Maternal Grandfather    • Pacemaker/AICD Paternal Grandmother    • Alcohol abuse Paternal Grandfather    • Tachycardia Sister    • Multiple sclerosis Father's Brother    • Cancer Mother's Sister         unsure of what type   • Other Mother's Brother    • Stroke Mother's Brother    • Asthma Son    • Eczema Son        Allergies   Allergen Reactions   • Pollen Extracts      Runny nose, congestion         Current Outpatient Medications:   •  ondansetron ODT (ZOFRAN-ODT) 4 mg disintegrating tablet, Take 1 tablet (4 mg total) by mouth every 8 (eight) hours as needed for nausea or vomiting for up to 7 days, Disp: 20 tablet, Rfl: 0  •  HYDROcodone-acetaminophen (NORCO) 5-325 mg per tablet, Take 1 tablet by mouth every 6 (six) hours as needed for pain scale 8-10/10 Max Daily Amount: 4 tablets, Disp: 15 tablet, Rfl: 0  •  Estarylla 0.25-35  mg-mcg per tablet, Take 1 tablet by mouth daily, Disp: , Rfl:   •  spironolactone (ALDACTONE) 25 mg tablet, TAKE 1 TABLET(25 MG) BY MOUTH DAILY, Disp: 90 tablet, Rfl: 3  •  levothyroxine (SYNTHROID, LEVOTHROID) 137 mcg tablet, Take 137 mcg by mouth daily, Disp: 90 tablet, Rfl: 3  •  sertraline (ZOLOFT) 100 mg tablet, TAKE 1 TABLET(100 MG) BY MOUTH DAILY, Disp: 90 tablet, Rfl: 3  •  budesonide (PULMICORT) 0.5 mg/2 mL nebulizer solution, Add 1 respule to 8 ounces saline and irrigate each side of nose twice daily as directed., Disp: , Rfl:   •  predniSONE 10 mg tablet, Take 40 mg a day for 2 weeks, then 30 mg a day for 2 weeks, then 20 mg a day for 2 weeks, then 10 mg a day for 2 weeks and then contact your pulmonologist, Disp: , Rfl:   •  montelukast (SINGULAIR) 10 mg tablet, Take 1 tablet (10 mg total) by mouth nightly, Disp: 30 tablet, Rfl: 11  •  ipratropium-albuteroL (DUO-NEB) 0.5-2.5 mg/3 mL nebulizer solution, U 3 ML VIA NEB Q 6 H PRF WHZ, Disp: , Rfl:   •  cetirizine (ZyrTEC) 10 mg tablet, Take 10 mg by mouth daily, Disp: , Rfl:   •  albuterol HFA (PROVENTIL HFA;VENTOLIN HFA) 90 mcg/actuation inhaler, Inhale 2 puffs every 2 (two) hours as needed for wheezing (every 2-3 hours as needed for cough/wheezing), Disp: 1 Inhaler, Rfl: 1      ROS:  Constitutional: Negative for fever.   HENT: Negative for sore throat.    Eyes: Negative for pain.   Respiratory: Negative for shortness of breath.    Cardiovascular: Negative for chest pain.   Gastrointestinal: positive for abdominal pain.   Endocrine: Negative for polyuria.   Genitourinary: positive for flank pain.   Musculoskeletal: Negative for back pain.   Neurological: Negative for headaches.   Hematological: Negative for bleeding.       ED Triage Vitals   Temp Heart Rate Resp BP SpO2   06/20/21 0740 06/20/21 0740 06/20/21 0740 06/20/21 0740 06/20/21 0740   36.7 °C (98.1 °F) 72 17 155/91 99 %      Temp Source Heart Rate Source Patient Position BP Location FiO2 (%)    06/20/21 0740 -- 06/20/21 0815 -- --   Oral  Head of bed 30 degrees or higher           Physical Exam:  Nursing note and vitals reviewed.  Constitutional: appears dehydrated. Moderate distress.   HENT: Dry mucous membranes.   Head: Normocephalic and atraumatic.   Eyes: Pupils are equal, round, and reactive to light.   Neck: Supple, no lymphadenopathy  Cardiovascular: Regular rate and rhythm.  Normal pulses.  Pulmonary/Chest: No respiratory distress.  Clear to auscultation bilaterally.  Abdominal: Tender right lower quadrant with no rebound or guarding.  Back: No CVA tenderness.  Musculoskeletal: No edema  Neurological: Alert and oriented.  No gross weakness.  Skin: Skin is warm and dry. No rash noted.   Psychiatric: Normal mood and affect.       Labs Reviewed   CBC WITH AUTO DIFFERENTIAL - Abnormal       Result Value    WBC 7.5      RBC 4.31      Hemoglobin 11.9      Hematocrit 36.6      MCV 85.0      MCH 27.7 (*)     MCHC 32.6      RDW 14.9 (*)     Platelets 301      MPV 8.4      Neutrophils% 55      Lymphocytes% 22      Monocytes% 8      Eosinophils% 13 (*)     Basophils% 1      ANC (auto diff) 4.20      Lymphocytes Absolute 1.70      Monocytes Absolute 0.60      Eosinophils Absolute 1.00      Basophils Absolute 0.10     COMPREHENSIVE METABOLIC PANEL - Abnormal    Sodium 138      Potassium 4.2      Chloride 108 (*)     CO2 21      Anion Gap 9      BUN 15      Creatinine 0.61      Glucose 102      Calcium 8.0 (*)     AST 18      ALT (SGPT) 12      Alkaline Phosphatase 73      Total Protein 6.2      Albumin 3.4 (*)     Total Bilirubin 0.35      eGFR 108      Corrected Calcium 8.5 (*)     Narrative:     Estimated GFR calculated using the 2009 CKD-EPI creatinine equation.   URINALYSIS, DIPSTICK ONLY, FOR USE WITH MICROSCOPIC PANEL - Abnormal    Color, Urine Straw      Clarity, Urine Clear      Specific Gravity, Urine 1.021      Leukocytes, Urine Negative      Nitrite, Urine Negative      Protein, Urine Negative       Ketones, Urine Negative      Urobilinogen, Urine <2.0      Bilirubin, Urine Negative      Blood, Urine Small (*)     Glucose, Urine Negative      pH, Urine 7.0     URINALYSIS, MICROSCOPIC ONLY - Normal    RBC, Urine Negative      WBC, Urine 0-4      Squamous Epithelial, Urine 0-4      Bacteria, Urine None seen     URINALYSIS WITH MICROSCOPIC    Narrative:     The following orders were created for panel order Urinalysis w/microscopic Urine, Straight Catheter.  Procedure                               Abnormality         Status                     ---------                               -----------         ------                     Urinalysis, microscopic U...[[id number]]  Normal              Final result               Urinalysis, dipstick Urin...[[id number]]  Abnormal            Final result                 Please view results for these tests on the individual orders.       US transvaginal and pelvic duplex   Final Result   IMPRESSION:       1. Interval increased size of the cystic lesion of the right ovary, with peripheral nodularity. This measures 3.6 cm. This could represent hemorrhagic cyst with retracting clot along the periphery, although solid components cannot be ruled out. Given interval increased size recommend gynecologic consultation, and recommend short interval follow-up ultrasound within 6 weeks.   2. Normal appearance of the left ovary.   3. Normal appearance of the uterus with endometrial thickness 9 mm.         CT ABDOMEN PELVIS W IV CONTRAST   Final Result   IMPRESSION:      1.  No acute inflammatory changes identified in the abdomen or pelvis.   2.  3 cm right ovarian lesion is nonspecific but favored to represent a hemorrhagic cyst. This could be confirmed with ultrasound.                   MDM:    Old records reviewed. Transvaginal ultrasound from October 2020 showed 2 cm right ovarian cyst. Comprehensive evaluation performed in the emergency department today. Continuous cardiac and respiratory  monitoring were performed in the ED. Nursing notes were reviewed as well as labs and imaging studies. IV fluids given here for rehydration as well as intravenous morphine for pain and antiemetics to prevent vomiting.  No evidence of acute cholecystitis, appendicitis, ovarian torsion, ectopic pregnancy, urinary tract infection, renal stone, mesenteric ischemia, bowel obstruction, pneumoperitoneum or peritonitis. Results were discussed with the patient and she was comfortable with outpatient follow-up. Patient was stable for discharge home. Return instructions were given. Prescriptions for Norco and Zofran were provided.    Procedures        Clinical Impression:  Final diagnoses:   [R10.9] Abdominal pain   [N83.201] Right ovarian cyst       By signing my name, I, Nikhil Castaneda, attest that this documentation has been prepared under the direction and in the presence of Dr. Lala, 6/20/2021, 7:44 AM.    I, Dr. Larry Lala, personally performed the services described in this documentation as typed by the scribe while in my presence and it is both accurate and complete.     A voice recognition program was used to aid in the documentation of this record. Sometimes words are not printed exactly as they were spoken. While efforts were made to carefully edit and correct any inaccuracies, some errors may be present; please take these into context. Please contact the provider if errors are identified.      Larry Lala MD  06/20/21 [id number]

## 2021-06-21 ENCOUNTER — OFFICE VISIT NO LOS (OUTPATIENT)
Dept: AUDIOLOGY | Facility: CLINIC | Age: 46
End: 2021-06-21
Payer: COMMERCIAL

## 2021-06-21 ENCOUNTER — TELEPHONE (OUTPATIENT)
Dept: FAMILY MEDICINE | Facility: CLINIC | Age: 46
End: 2021-06-21

## 2021-06-21 DIAGNOSIS — N83.209 CYST OF OVARY, UNSPECIFIED LATERALITY: Primary | ICD-10-CM

## 2021-06-21 DIAGNOSIS — Z46.1 HEARING AID FITTING OR ADJUSTMENT: Primary | ICD-10-CM

## 2021-06-21 PROCEDURE — V5014 HEARING AID REPAIR/MODIFYING: HCPCS | Mod: NC | Performed by: AUDIOLOGIST

## 2021-06-21 NOTE — TELEPHONE ENCOUNTER
Spoke with patient and referral has been made, patient does not have any other questions or concerns at this time.

## 2021-06-21 NOTE — TELEPHONE ENCOUNTER
Caller would like to discuss (a) return call Writer has advised caller of a callback from within 24 hours.    Patient: Tamiko Tiwari    Caller Name (Last and first, relation/role): self    Name of Facility: n/a    Callback Number: [phone]    Best Availability: anytime     Fax Number: n/a    Additional Info: Patient would like to get a referral to see Consuelo Dunham at [facility]. Patient also wanted to let the Dr know that she was at Emergency room on 6/20/2021 for ovarian cyst and possible canceer    Did you confirm the message with the caller: Yes    Is it okay that the nurse communicates your response through MyChart? No

## 2021-06-23 NOTE — PROGRESS NOTES
Hearing Aid Adjustment    Tamiko Tiwari was seen on 6/21/2021 [facility]      Subjective   Patient reports that she is not doing as well with her hearing aids as she would like. She was fit with Phonak P90 -R hearing aids in December of 2020, she previously wore Leo hearing aids. Tamiko was previously working with Dr. Epps before she left [facility] in March. Her  accompanied her to the appointment today and also reports that he does not feel like she is hearing well with the new aids.     Objective / Assessment   Cleaned hearing aids and completed a listening check - hearing aids are in good working order.   Connected hearing aids to the computer. Hearing aids were verified using on ear speech measures with NAL-NL2 targets and measured RECDs. Hearing aids were near targets, a few adjustments were made meet targets.     Discussed communication strategies and provided the 5 keys book for auditory rehab use.     Plan  Ms. Tiwari should return to the clinic in one month.          No Charge today.

## 2021-06-23 NOTE — TELEPHONE ENCOUNTER
Per pt would like to see Dr. Gayle in [place] instead, requesting referral. Confirmation through my chart, please.

## 2021-06-30 DIAGNOSIS — G47.00 INSOMNIA, UNSPECIFIED TYPE: ICD-10-CM

## 2021-06-30 RX ORDER — SERTRALINE HYDROCHLORIDE 100 MG/1
TABLET, FILM COATED ORAL
Qty: 90 TABLET | Refills: 1 | Status: SHIPPED | OUTPATIENT
Start: 2021-06-30 | End: 2021-11-22

## 2021-06-30 NOTE — TELEPHONE ENCOUNTER
Care Due:                  Date            Visit Type   Department     Provider  --------------------------------------------------------------------------------                                           [facility]  Last Visit: 09-      [facility]       DENNYS RAYGOZA  Next Visit: None Scheduled  None         None Found                                                            Last  Test          Frequency    Reason                     Performed    Due Date  --------------------------------------------------------------------------------  Office Visit  6 months...  spironolactone...........  09- 03-    Powered by [employer]. Reference number: [id number]. 6/30/2021 7:03:39 AM MIKO

## 2021-06-30 NOTE — TELEPHONE ENCOUNTER
Rx refill request is not from the patient's listed PCP in their chart.  Returning to primary nurse to review.  Thank you.

## 2021-07-02 ENCOUNTER — APPOINTMENT (OUTPATIENT)
Dept: LAB | Facility: CLINIC | Age: 46
End: 2021-07-02
Payer: COMMERCIAL

## 2021-07-02 ENCOUNTER — CONSULT (OUTPATIENT)
Dept: OBSTETRICS AND GYNECOLOGY | Facility: CLINIC | Age: 46
End: 2021-07-02
Payer: COMMERCIAL

## 2021-07-02 VITALS
WEIGHT: 176.5 LBS | SYSTOLIC BLOOD PRESSURE: 138 MMHG | HEIGHT: 66 IN | BODY MASS INDEX: 28.37 KG/M2 | DIASTOLIC BLOOD PRESSURE: 84 MMHG

## 2021-07-02 DIAGNOSIS — N93.9 ABNORMAL UTERINE BLEEDING (AUB): ICD-10-CM

## 2021-07-02 DIAGNOSIS — N83.00 CYST, OVARY, FOLLICULAR: ICD-10-CM

## 2021-07-02 DIAGNOSIS — E03.9 HYPOTHYROIDISM, UNSPECIFIED TYPE: ICD-10-CM

## 2021-07-02 DIAGNOSIS — E03.9 HYPOTHYROIDISM, UNSPECIFIED TYPE: Primary | ICD-10-CM

## 2021-07-02 LAB
CANCER AG125 SERPL-ACNC: 28.2 U/ML (ref 0–35.9)
TSH SERPL DL<=0.05 MIU/L-ACNC: 1.77 UIU/ML (ref 0.34–4.82)

## 2021-07-02 PROCEDURE — 84443 ASSAY THYROID STIM HORMONE: CPT | Performed by: INTERNAL MEDICINE

## 2021-07-02 PROCEDURE — 99243 OFF/OP CNSLTJ NEW/EST LOW 30: CPT | Performed by: OBSTETRICS & GYNECOLOGY

## 2021-07-02 PROCEDURE — 36415 COLL VENOUS BLD VENIPUNCTURE: CPT | Performed by: INTERNAL MEDICINE

## 2021-07-02 PROCEDURE — 86304 IMMUNOASSAY TUMOR CA 125: CPT | Performed by: INTERNAL MEDICINE

## 2021-07-02 RX ORDER — DROSPIRENONE 4 MG/1
4 TABLET, FILM COATED ORAL DAILY
Qty: 30 TABLET | Refills: 11 | Status: ON HOLD | OUTPATIENT
Start: 2021-07-02 | End: 2021-11-29 | Stop reason: ALTCHOICE

## 2021-07-02 NOTE — PROGRESS NOTES
Subjective     Chief Complaint   Patient presents with   • Ovarian Cyst       Tamiko Tiwari is a 46 y.o. female who presents for evaluation of ovarian cyst. She initially had a TVUS on 10/7/20 showing uterus 10.8x4.3x4.2cm with possible 1.9x0.9x1.4cm posterior intramural fibroid, ES 6mm. Right ovarian cyst 1.8x1.5x1.0cm, simple appearing but poor quality and difficult to visualize when images were reviewed. She was then seen by Dr. Dunham and placed on OCPs and she reports she had a negative EMB, I do not have those results. She reports she has spotting the week before her placebo pills. She reports periods are still heavy with clots, only slightly improved. She has to change super tampon every hour on heavy days. She reports usually 2 heavy days.     She was then seen in the ED on 21 for acute right sided abdominal pain she thought was an appendicitis. She had CT scan showing 3cm right ovarian cyst, possibly hemorrhagic, remainder of exam normal. She had TVUS showing interval increase in right ovarian cyst with peripheral nodularity, measuring 3.6cm. Uterus was also measured 14x7.1x4.8cm (but on review of images this was when measured transabdominally, when measured transvaginally it measured 8.6x4.7x5.6cm, ES 9mm, fibroid not well visualized). Ovarian cyst does now appear complex with blood flow. No free fluid.     She reports pain has improved. She would like a second opinion about these findings. She was told by Dr. Dunham that she was not worried about the cyst, but was worried about the increase in uterine size.     She does have asthma, HTN, irone deficiency anemia, and hypothyroidism. TSH has not been checked for over a year. She is take oral Fe. Her only surgery was a  section. Normal pap 2018, neg HPV. She reports both of her sisters had hysterectomies for heavy periods fibroids. No known family history of uterine, colon, ovarian cancer. Her  was just diagnosed with colon cancer and  has surgery next week with Dr. Gil.     She also questions recent hgb showing elevated eosinophils. She reports her asthma was related to this but has been under good control.     Current Medications:  Current Outpatient Medications   Medication Sig Dispense Refill   • sertraline (ZOLOFT) 100 mg tablet TAKE 1 TABLET(100 MG) BY MOUTH DAILY 90 tablet 1   • spironolactone (ALDACTONE) 25 mg tablet TAKE 1 TABLET(25 MG) BY MOUTH DAILY 90 tablet 3   • levothyroxine (SYNTHROID, LEVOTHROID) 137 mcg tablet Take 137 mcg by mouth daily 90 tablet 3   • montelukast (SINGULAIR) 10 mg tablet Take 1 tablet (10 mg total) by mouth nightly 30 tablet 11   • albuterol HFA (PROVENTIL HFA;VENTOLIN HFA) 90 mcg/actuation inhaler Inhale 2 puffs every 2 (two) hours as needed for wheezing (every 2-3 hours as needed for cough/wheezing) 1 Inhaler 1   • drospirenone, contraceptive, (Slynd) 4 mg (28) tablet Take 4 mg by mouth daily 30 tablet 11   • HYDROcodone-acetaminophen (NORCO) 5-325 mg per tablet Take 1 tablet by mouth every 6 (six) hours as needed for pain scale 8-10/10 Max Daily Amount: 4 tablets 15 tablet 0   • Estarylla 0.25-35 mg-mcg per tablet Take 1 tablet by mouth daily     • budesonide (PULMICORT) 0.5 mg/2 mL nebulizer solution Add 1 respule to 8 ounces saline and irrigate each side of nose twice daily as directed.       No current facility-administered medications for this visit.       Allergies:  Allergies   Allergen Reactions   • Pollen Extracts      Runny nose, congestion       Problem List  does not have any pertinent problems on file.    OBGyn History  OB History        2    Para   1    Term                AB   1    Living   1       SAB        TAB        Ectopic        Molar        Multiple        Live Births                    Past Medical History  Past Medical History:   Diagnosis Date   • Allergic Pollen -    • Depression    • Hypertension    • Hypothyroid    • Iron deficiency 2020   • Severe  "persistent asthma with acute exacerbation 2020    Eosinophilic asthma; Peterson        Family History  Family History   Problem Relation Age of Onset   • Prostate cancer Father    • Hypertension Mother    • Other Sister         Hysterectomy due to benign tumors    • Hypertension Brother    • Heart failure Maternal Grandmother    • Alcohol abuse Maternal Grandfather    • Pacemaker/AICD Paternal Grandmother    • Alcohol abuse Paternal Grandfather    • Tachycardia Sister    • Multiple sclerosis Father's Brother    • Cancer Mother's Sister         unsure of what type   • Other Mother's Brother    • Stroke Mother's Brother    • Asthma Son    • Eczema Son        Surgical History  Past Surgical History:   Procedure Laterality Date   •  SECTION         Social History  Social History     Tobacco Use   • Smoking status: Never Smoker   • Smokeless tobacco: Never Used   Substance Use Topics   • Alcohol use: Yes     Alcohol/week: 0.0 standard drinks     Comment: occ   • Drug use: No       Review of Systems   Constitutional: Negative for chills, fatigue and fever.   Respiratory: Negative for cough and shortness of breath.    Cardiovascular: Negative for chest pain.   Gastrointestinal: Negative for abdominal pain, constipation, diarrhea, nausea and vomiting.   Genitourinary: Positive for menstrual problem, pelvic pain and vaginal bleeding. Negative for difficulty urinating, dyspareunia, dysuria, frequency, hematuria, urgency and vaginal discharge.   Neurological: Negative for headaches.       Objective     /84 (BP Location: Left arm, Patient Position: Sitting, Cuff Size: Regular Adult)   Ht 1.676 m (5' 6\")   Wt 80.1 kg (176 lb 8 oz)   BMI 28.49 kg/m²     Physical Exam  Vitals reviewed.   Constitutional:       General: She is not in acute distress.     Appearance: Normal appearance.   HENT:      Head: Normocephalic and atraumatic.      Nose: No congestion or rhinorrhea.   Pulmonary:      Effort: Pulmonary " effort is normal.   Abdominal:      General: Abdomen is flat.      Palpations: Abdomen is soft.      Tenderness: There is no abdominal tenderness.   Genitourinary:     General: Normal vulva.      Vagina: Normal.      Cervix: Normal.      Uterus: Normal.       Adnexa: Left adnexa normal.        Right: Tenderness present. No mass.        Comments: Mild right adnexal tenderness  Musculoskeletal:      Cervical back: Normal range of motion.   Neurological:      General: No focal deficit present.      Mental Status: She is alert.   Psychiatric:         Mood and Affect: Mood normal.         Behavior: Behavior normal.         Diagnoses and all orders for this visit:    Hypothyroidism, unspecified type  -     Thyroid Stimulating Hormone, Ultrasensitive Blood, Venous; Future    Cyst, ovary, follicular  -     drospirenone, contraceptive, (Slynd) 4 mg (28) tablet; Take 4 mg by mouth daily  -     US transvaginal; Future        Assessment/Plan    1. Ovarian cyst- we discussed findings most likely benign but not able to determine that without removal. We discussed ca-125 as screening test, but that this can be normal in early stage ovarian cancer. We discussed options of oophorectomy vs. Continued observation. Would recommend f/u TVUS in 6 weeks if ca-125 is normal, if any change, would recommend removal at that time. This was scheduled for [facility] weeks at [facility]. We discussed ovarian torsion is rare with cyst this small. Discussed if acute pain again needs to present to ED.    2. AUB- We discussed evaluation of AUB to include labs and ultrasound. Ultrasound did not show any cause of bleeding. Recommend TSH to make sure thyroid levels are adequate and meds do not need to be adjusted. We discussed management options including medical and surgical. Medical options include POPs, depo provera, Mirena IUD. I discussed taking her off combined OCPs given age and HTN and switching to Slynd, coupon provided. We discussed surgical  options to include endometrial ablation and hysterectomy. We discussed she would need permanent contraception with ablation. She was given information on Mirena, ablation, and hysterectomy. They will discuss options and let us know how they would like to proceed. Will request records from Dr. Dunham of prior EMB.       Nina Isaacs, DO

## 2021-07-05 ASSESSMENT — ENCOUNTER SYMPTOMS
FEVER: 0
COUGH: 0
FATIGUE: 0
DIFFICULTY URINATING: 0
HEADACHES: 0
NAUSEA: 0
VOMITING: 0
SHORTNESS OF BREATH: 0
HEMATURIA: 0
CHILLS: 0
ABDOMINAL PAIN: 0
CONSTIPATION: 0
DIARRHEA: 0
DYSURIA: 0
FREQUENCY: 0

## 2021-07-13 ENCOUNTER — TELEPHONE (OUTPATIENT)
Dept: AUDIOLOGY | Facility: CLINIC | Age: 46
End: 2021-07-13

## 2021-07-20 ENCOUNTER — OFFICE VISIT NO LOS (OUTPATIENT)
Dept: AUDIOLOGY | Facility: CLINIC | Age: 46
End: 2021-07-20
Payer: COMMERCIAL

## 2021-07-20 DIAGNOSIS — Z46.1 HEARING AID FITTING OR ADJUSTMENT: Primary | ICD-10-CM

## 2021-07-20 PROCEDURE — V5014 HEARING AID REPAIR/MODIFYING: HCPCS | Mod: NC | Performed by: AUDIOLOGIST

## 2021-07-20 NOTE — PROGRESS NOTES
Hearing Aid Adjustment    Tamiko Tiwari was seen on 7/20/2021 for a hearing aid adjustment at [facility].      Subjective   Patient reports that she is needing more clarity with her hearing aids.      Objective / Assessment   Cleaned and checked hearing aids - there was a static to both hearing aids. They were vacuumed which improved the static sound.   Connected hearing aids to the computer. Adjusted settings by reducing low frequency to help with background noise. Turned sound recover on, then changed from comfort to clarity to help with speech clarity.     Plan  Ms. Tiwari should return to the clinic as needed.          No Charge today.

## 2021-08-03 DIAGNOSIS — N83.00 CYST, OVARY, FOLLICULAR: Primary | ICD-10-CM

## 2021-08-03 NOTE — PROGRESS NOTES
Called patient with u/s results. Cyst is stable. Patient doing well on slynd. Would like to continue medical management. sHe will have f/u ultrasound in 3 months and call with any concerns. She will call if she desires surgical management.    Nina Isaacs, DO

## 2021-08-23 DIAGNOSIS — J45.51 SEVERE PERSISTENT ASTHMA WITH ACUTE EXACERBATION: ICD-10-CM

## 2021-08-23 RX ORDER — MONTELUKAST SODIUM 10 MG/1
TABLET ORAL
Qty: 30 TABLET | Refills: 11 | OUTPATIENT
Start: 2021-08-23

## 2021-09-17 DIAGNOSIS — J45.51 SEVERE PERSISTENT ASTHMA WITH ACUTE EXACERBATION: ICD-10-CM

## 2021-09-17 RX ORDER — MONTELUKAST SODIUM 10 MG/1
TABLET ORAL
Qty: 30 TABLET | Refills: 11 | OUTPATIENT
Start: 2021-09-17

## 2021-09-20 ENCOUNTER — TELEPHONE (OUTPATIENT)
Dept: OBSTETRICS AND GYNECOLOGY | Facility: CLINIC | Age: 46
End: 2021-09-20

## 2021-09-20 NOTE — TELEPHONE ENCOUNTER
I would have pt continue on the pill for now and set up a consult with Dr Isaacs. If the bleeding becomes heavy she can stop pill x 5 days and then restart new pill pack until she is seen but if the bleeding isn't heavy, continue on the active pills as Lisa advised.

## 2021-09-20 NOTE — TELEPHONE ENCOUNTER
Please let her know this:   Any progesterone only method can cause irregular or unscheduled bleeding.   She is not a candidate for estrogen due to her age and hypertension.   Dr. Isaacs offered: Mirena, ablation, or hysterectomy. If she doesn't like the spotting, or the spotting is getting worse, then she has these other options.   She can schedule a consult with Dr. Isaacs, but would be best to stay on this pill until she figures out the next step.

## 2021-09-20 NOTE — TELEPHONE ENCOUNTER
"Pt started on Slynd 7/2/2021, hx of right ovarian cyst.   Pt reports that she has had irreg spotting mid cycle, but her period started last Saturday while on this medication, \" like a regular period with small clots. Pt denies pain or any right ovarian pain. Pt has questions about continuing this pill , due to this period mid cycle.  "

## 2021-09-21 NOTE — TELEPHONE ENCOUNTER
Patient reports that the heavy vaginal bleeding has lessened, had continued her drospirenone thru the bleeding cycle. Pt advised to schedule appointment with Dr Isaacs . Given ANGELO Goldberg's advise regarding questions regarding . Slynd administration. Transferred to scheduling to make appointment with Dr Isaacs.

## 2021-09-22 DIAGNOSIS — I10 ESSENTIAL HYPERTENSION: Chronic | ICD-10-CM

## 2021-09-22 DIAGNOSIS — E03.9 ACQUIRED HYPOTHYROIDISM: Chronic | ICD-10-CM

## 2021-09-22 RX ORDER — LEVOTHYROXINE SODIUM 137 UG/1
TABLET ORAL
Qty: 90 TABLET | Refills: 3 | OUTPATIENT
Start: 2021-09-22

## 2021-09-22 RX ORDER — SPIRONOLACTONE 25 MG/1
TABLET ORAL
Qty: 90 TABLET | Refills: 3 | OUTPATIENT
Start: 2021-09-22

## 2021-09-22 NOTE — TELEPHONE ENCOUNTER
Care Due:                  Date            Visit Type   Department     Provider  --------------------------------------------------------------------------------                                           [facility]  Last Visit: 09-      [facility]       [place] RAYGOZA  Next Visit: None Scheduled  None         None Found                                                            Last  Test          Frequency    Reason                     Performed    Due Date  --------------------------------------------------------------------------------  Office Visit  6 months...  levothyroxine,             09- 03-                             sertraline,                             spironolactone...........    Powered by [employer]. Reference number: [id number]. 9/22/2021 5:01:08 AM MIKO

## 2021-09-25 DIAGNOSIS — E03.9 ACQUIRED HYPOTHYROIDISM: Chronic | ICD-10-CM

## 2021-09-25 NOTE — TELEPHONE ENCOUNTER
No new care gaps identified.  Powered by [employer]. Reference number: [id number]. 9/25/2021 10:37:29 AM MIKO

## 2021-09-26 RX ORDER — LEVOTHYROXINE SODIUM 137 UG/1
TABLET ORAL
Qty: 90 TABLET | Refills: 0 | Status: SHIPPED | OUTPATIENT
Start: 2021-09-26 | End: 2021-11-22

## 2021-09-26 NOTE — TELEPHONE ENCOUNTER
Medication refill request:  Call Center request for office visit (routed to Call Center)  90-day Courtesy Refill given

## 2021-09-27 DIAGNOSIS — I10 ESSENTIAL HYPERTENSION: Chronic | ICD-10-CM

## 2021-09-27 RX ORDER — SPIRONOLACTONE 25 MG/1
25 TABLET ORAL DAILY
Qty: 30 TABLET | Refills: 0 | Status: SHIPPED | OUTPATIENT
Start: 2021-09-27 | End: 2021-11-24

## 2021-10-04 ENCOUNTER — OFFICE VISIT (OUTPATIENT)
Dept: OTOLARYNGOLOGY | Facility: CLINIC | Age: 46
End: 2021-10-04
Payer: COMMERCIAL

## 2021-10-04 VITALS
BODY MASS INDEX: 28.73 KG/M2 | TEMPERATURE: 97.5 F | DIASTOLIC BLOOD PRESSURE: 84 MMHG | WEIGHT: 178 LBS | HEART RATE: 86 BPM | OXYGEN SATURATION: 95 % | SYSTOLIC BLOOD PRESSURE: 157 MMHG

## 2021-10-04 DIAGNOSIS — J32.9 CHRONIC SINUSITIS, UNSPECIFIED LOCATION: Primary | ICD-10-CM

## 2021-10-04 PROCEDURE — 99214 OFFICE O/P EST MOD 30 MIN: CPT | Performed by: OTOLARYNGOLOGY

## 2021-10-04 RX ORDER — AMOXICILLIN AND CLAVULANATE POTASSIUM 875; 125 MG/1; MG/1
1 TABLET, FILM COATED ORAL 2 TIMES DAILY
Qty: 42 TABLET | Refills: 0 | Status: SHIPPED | OUTPATIENT
Start: 2021-10-04 | End: 2021-10-25

## 2021-10-04 RX ORDER — FLUTICASONE PROPIONATE AND SALMETEROL 250; 50 UG/1; UG/1
1 POWDER RESPIRATORY (INHALATION) 2 TIMES DAILY
COMMUNITY
Start: 2021-08-27 | End: 2021-10-06 | Stop reason: SDUPTHER

## 2021-10-04 RX ORDER — FLUTICASONE PROPIONATE AND SALMETEROL 250; 50 UG/1; UG/1
POWDER RESPIRATORY (INHALATION)
COMMUNITY
Start: 2021-04-26 | End: 2021-11-22

## 2021-10-04 RX ORDER — PREDNISONE 50 MG/1
TABLET ORAL
Qty: 5 TABLET | Refills: 0 | Status: SHIPPED | OUTPATIENT
Start: 2021-10-04 | End: 2021-10-27 | Stop reason: ALTCHOICE

## 2021-10-04 ASSESSMENT — ENCOUNTER SYMPTOMS
COUGH: 1
FATIGUE: 1
FEVER: 0
HEADACHES: 1
TROUBLE SWALLOWING: 0
SORE THROAT: 0
CHILLS: 0
SINUS PRESSURE: 1
RHINORRHEA: 0

## 2021-10-04 NOTE — PROGRESS NOTES
Subjective    CC: Follow-up sinusitis    HPI: Tamiko Tiwari is a 46 y.o. female who I saw a year and a half ago for chronic sinusitis.  She was treated with aggressive medical therapy and had a posttreatment CT scan which revealed moderate chronic sinusitis.  She ended up not going ahead with surgery at that time.    She does have associated asthma.    In the time since then she has in general noticed gradual worsening of her sinusitis symptoms.  She feels like it is making it harder for her to control the cough that is associated with her asthma.  She has pressure in the sinuses and discharge as well as nasal obstruction.    Past Medical History:   Diagnosis Date   • Allergic Pollen -    • Depression    • Hypertension    • Hypothyroid    • Iron deficiency 2020   • Severe persistent asthma with acute exacerbation 2020    Eosinophilic asthma; [place]       Past Surgical History:   Procedure Laterality Date   •  SECTION           Current Outpatient Medications:   •  fluticasone propion-salmeteroL (Wixela Inhub) 250-50 mcg/dose diskus inhaler, INHALE 1 PUFF TWICE DAILY RINSE MOUTH TO REDUCE AFTERTASTE DO NOT SWALLOW, Disp: , Rfl:   •  Wixela Inhub 250-50 mcg/dose diskus inhaler, 1 puff 2 (two) times a day, Disp: , Rfl:   •  spironolactone (ALDACTONE) 25 mg tablet, Take 1 tablet (25 mg total) by mouth daily, Disp: 30 tablet, Rfl: 0  •  levothyroxine (SYNTHROID, LEVOTHROID) 137 mcg tablet, TAKE 1 TABLET BY MOUTH DAILY, Disp: 90 tablet, Rfl: 0  •  drospirenone, contraceptive, (Slynd) 4 mg (28) tablet, Take 4 mg by mouth daily, Disp: 30 tablet, Rfl: 11  •  sertraline (ZOLOFT) 100 mg tablet, TAKE 1 TABLET(100 MG) BY MOUTH DAILY, Disp: 90 tablet, Rfl: 1  •  budesonide (PULMICORT) 0.5 mg/2 mL nebulizer solution, Add 1 respule to 8 ounces saline and irrigate each side of nose twice daily as directed., Disp: , Rfl:   •  albuterol HFA (PROVENTIL HFA;VENTOLIN HFA) 90 mcg/actuation inhaler, Inhale 2  puffs every 2 (two) hours as needed for wheezing (every 2-3 hours as needed for cough/wheezing), Disp: 1 Inhaler, Rfl: 1  •  HYDROcodone-acetaminophen (NORCO) 5-325 mg per tablet, Take 1 tablet by mouth every 6 (six) hours as needed for pain scale 8-10/10 Max Daily Amount: 4 tablets, Disp: 15 tablet, Rfl: 0  •  Estarylla 0.25-35 mg-mcg per tablet, Take 1 tablet by mouth daily, Disp: , Rfl:     Allergies   Allergen Reactions   • Pollen Extracts      Runny nose, congestion       family history includes Alcohol abuse in her maternal grandfather and paternal grandfather; Asthma in her son; Cancer in her mother's sister; Eczema in her son; Heart failure in her maternal grandmother; Hypertension in her brother and mother; Multiple sclerosis in her father's brother; Other in her mother's brother and sister; Pacemaker/AICD in her paternal grandmother; Prostate cancer in her father; Stroke in her mother's brother; Tachycardia in her sister.    Social History     Tobacco Use   • Smoking status: Never Smoker   • Smokeless tobacco: Never Used   Substance Use Topics   • Alcohol use: Yes     Alcohol/week: 0.0 standard drinks     Comment: occ   • Drug use: No       Review of Systems   Constitutional: Positive for fatigue. Negative for chills and fever.   HENT: Positive for congestion, hearing loss, postnasal drip and sinus pressure. Negative for ear discharge, ear pain, nosebleeds, rhinorrhea, sneezing, sore throat, tinnitus and trouble swallowing.         Ears plugged up   Respiratory: Positive for cough.         Trouble breathing   Neurological: Positive for headaches.       Objective    /84 (BP Location: Left arm, Patient Position: Sitting, Cuff Size: Long Adult)   Pulse 86   Temp 36.4 °C (97.5 °F) (Temporal)   Wt 80.7 kg (178 lb)   SpO2 95%   BMI 28.73 kg/m²     PHYSICAL EXAMINATION:      GENERAL:  Well-developed, well-nourished.  The patient is alert, oriented and in no acute distress.        PSYCH: Normal mood and  affect.        SKIN: No evidence of significant rash or concerning skin lesion on the head or neck.      HEAD/FACE:  Normally formed without evidence of mass or trauma.  The sinuses are nontender.        EARS: Bilateral external ears are normal.  Bilateral external auditory canals are normal.  Bilateral tympanic membranes intact and normal.      NOSE:  The external nose appears normal.  The septum is mostly midline.  The turbinates appear normal.  No pus or polyps.  Mucosa is somewhat edematous around the middle meatus.      ORAL CAVITY/OROPHARYNX:  There are no focal masses or lesions.  There are normal mucous membranes.  The tonsils appear normal without mass or focal lesion.  Oropharynx mucosa appears normal.      NECK:  There is no palpable adenopathy, goiter or mass.  The trachea is midline.      Diagnosis    1. Chronic sinusitis, unspecified location        Recommendations    1.  Patient with chronic pansinusitis based on CT scan done last year.  I did review the results of that scan with her and showed her the pictures.  It reveals moderate maxillary mucosal thickening with more extensive ethmoid involvement left greater than right.  There is some extension into the frontal sinus and sphenoid sinus as well.  At this point I recommend repeat aggressive medical therapy 3 weeks of Augmentin +5 days of prednisone and a posttreatment CT scan with an eye toward endoscopic sinus surgery unless she improves tremendously and has a clear posttreatment CT.

## 2021-10-06 ENCOUNTER — OFFICE VISIT (OUTPATIENT)
Dept: PULMONOLOGY | Facility: CLINIC | Age: 46
End: 2021-10-06
Payer: COMMERCIAL

## 2021-10-06 VITALS
BODY MASS INDEX: 28.93 KG/M2 | WEIGHT: 180 LBS | HEART RATE: 84 BPM | SYSTOLIC BLOOD PRESSURE: 144 MMHG | DIASTOLIC BLOOD PRESSURE: 78 MMHG | OXYGEN SATURATION: 96 % | HEIGHT: 66 IN

## 2021-10-06 DIAGNOSIS — J45.51 SEVERE PERSISTENT ASTHMA WITH ACUTE EXACERBATION: Chronic | ICD-10-CM

## 2021-10-06 DIAGNOSIS — J45.50 SEVERE PERSISTENT ASTHMA WITHOUT COMPLICATION: Primary | ICD-10-CM

## 2021-10-06 PROCEDURE — 99214 OFFICE O/P EST MOD 30 MIN: CPT | Performed by: INTERNAL MEDICINE

## 2021-10-06 RX ORDER — MONTELUKAST SODIUM 10 MG/1
10 TABLET ORAL NIGHTLY
Qty: 30 TABLET | Refills: 11 | Status: SHIPPED | OUTPATIENT
Start: 2021-10-06 | End: 2022-10-06

## 2021-10-06 ASSESSMENT — PAIN SCALES - GENERAL: PAINLEVEL: 0-NO PAIN

## 2021-10-06 NOTE — PROGRESS NOTES
Pulmonary Follow-up Encounter  Primary Care Physician: Jacinda Duran MD  Referring Provider: No ref. provider found      Chief Complaint: Follow-up for asthma     HPI:  Tamiko Tiwari is a 46 y.o. female who presents for follow-up for asthma.  Patient states that she has had a fair amount of sinus disease recently.  She was recently started on prednisone and antibiotics by her ENT.  She is otherwise doing fairly well on her breathing.  She states that she only is using her albuterol about once a week.  She continues on Advair and has not had any issues compared to her previous exacerbation.  She denies any sinus polyps on previous exams.  She denies any mononeuropathy.  She does have increased cough when she lays down at night.  She also has some difficulty sleeping at night.  She blames both of these on her sinus disease.  She continues to work with ENT to try to resolve this.  She has no other current complaints            Complete review of systems performed and found to be negative other than what's mentioned in the HPI.      The following have been reviewed and updated as appropriate in this visit:         Allergies   Allergen Reactions   • Pollen Extracts      Runny nose, congestion     Current Outpatient Medications   Medication Sig Dispense Refill   • fluticasone propion-salmeteroL (Wixela Inhub) 250-50 mcg/dose diskus inhaler INHALE 1 PUFF TWICE DAILY RINSE MOUTH TO REDUCE AFTERTASTE DO NOT SWALLOW     • amoxicillin-pot clavulanate (AUGMENTIN) 875-125 mg per tablet Take 1 tablet by mouth 2 (two) times a day for 21 days 42 tablet 0   • predniSONE 50 mg tablet Take 1 tablet PO once daily with food for 5 days 5 tablet 0   • spironolactone (ALDACTONE) 25 mg tablet Take 1 tablet (25 mg total) by mouth daily 30 tablet 0   • levothyroxine (SYNTHROID, LEVOTHROID) 137 mcg tablet TAKE 1 TABLET BY MOUTH DAILY 90 tablet 0   • drospirenone, contraceptive, (Slynd) 4 mg (28) tablet Take 4 mg by mouth daily 30  "tablet 11   • sertraline (ZOLOFT) 100 mg tablet TAKE 1 TABLET(100 MG) BY MOUTH DAILY 90 tablet 1   • budesonide (PULMICORT) 0.5 mg/2 mL nebulizer solution Add 1 respule to 8 ounces saline and irrigate each side of nose twice daily as directed.     • albuterol HFA (PROVENTIL HFA;VENTOLIN HFA) 90 mcg/actuation inhaler Inhale 2 puffs every 2 (two) hours as needed for wheezing (every 2-3 hours as needed for cough/wheezing) 1 Inhaler 1   • montelukast (SINGULAIR) 10 mg tablet Take 1 tablet (10 mg total) by mouth nightly 30 tablet 11     No current facility-administered medications for this visit.         Objective   /78 (BP Location: Left arm, Patient Position: Sitting, Cuff Size: Regular Adult)   Pulse 84   Ht 1.676 m (5' 6\")   Wt 81.6 kg (180 lb)   SpO2 96%   BMI 29.05 kg/m²     Physical Exam  Vitals reviewed.   Constitutional:       General: She is not in acute distress.     Appearance: Normal appearance. She is not diaphoretic.   HENT:      Head: Normocephalic and atraumatic.      Mouth/Throat:      Mouth: Mucous membranes are moist.      Pharynx: Oropharynx is clear.   Eyes:      General: No scleral icterus.     Conjunctiva/sclera: Conjunctivae normal.   Cardiovascular:      Rate and Rhythm: Normal rate and regular rhythm.      Pulses: Normal pulses.      Heart sounds: No murmur heard.   No gallop.    Pulmonary:      Effort: No respiratory distress.      Breath sounds: No wheezing, rhonchi or rales.   Skin:     General: Skin is warm and dry.   Neurological:      Mental Status: She is alert and oriented to person, place, and time.           Imaging:No new imaging for review    Lab Results   Component Value Date    GLUCOSE 102 06/20/2021    CALCIUM 8.0 (L) 06/20/2021     06/20/2021    K 4.2 06/20/2021    CO2 21 06/20/2021     (H) 06/20/2021    BUN 15 06/20/2021    CREATININE 0.61 06/20/2021    ANIONGAP 9 06/20/2021     Lab Results   Component Value Date    WBC 7.5 06/20/2021    HGB 11.9 " 06/20/2021    HCT 36.6 06/20/2021    MCV 85.0 06/20/2021     06/20/2021     No results found for: PT, INR, APTT     ASSESSMENT AND PLAN   Severe persistent asthma with acute exacerbation  Patient has eosinophilic asthma.  Her most recent CBC off prednisone did show elevated eosinophil count.  She has however been well controlled on Singulair and Wixela (250/50).  Aside from her sinuses patient really feels like her shortness of breath and cough is fairly well controlled.  She is only needing her albuterol once a week.  She does not really feel like her asthma inhibits her. Agree with continuing to work with ENT. She does not have persistent polyps and I'm not clear that dupixent would be of great benefit at this point.         Follow up in 1 year(s)    Mellissa Lauren MD  10/7/2021 8:29 AM

## 2021-10-06 NOTE — LETTER
[facility] PULMONOLOGY  [address] [facility] [address]  [phone]  Dept: [phone]  10/07/21      Jacinda Duran MD  [address] [facility] [id number]        To: Jacinda Duran MD      Thank you for referring your patient, Tamiko Tiwari, to receive care in my office. I have enclosed a summary of the care provided to Tamiko on 10/07/21.    Please contact me with any questions you may have regarding the visit.    Sincerely,         Mellissa Lauren MD  [address] [facility] [address]  [phone]    CC: Ally Carmen MD

## 2021-10-07 NOTE — ASSESSMENT & PLAN NOTE
Patient has eosinophilic asthma.  Her most recent CBC off prednisone did show elevated eosinophil count.  She has however been well controlled on Singulair and Wixela (250/50).  Aside from her sinuses patient really feels like her shortness of breath and cough is fairly well controlled.  She is only needing her albuterol once a week.  She does not really feel like her asthma inhibits her. Agree with continuing to work with ENT. She does not have persistent polyps and I'm not clear that dupixent would be of great benefit at this point.

## 2021-10-25 ENCOUNTER — HOSPITAL ENCOUNTER (OUTPATIENT)
Dept: CT IMAGING | Facility: HOSPITAL | Age: 46
Discharge: HOME | End: 2021-10-25
Payer: COMMERCIAL

## 2021-10-25 ENCOUNTER — OFFICE VISIT (OUTPATIENT)
Dept: OTOLARYNGOLOGY | Facility: CLINIC | Age: 46
End: 2021-10-25
Payer: COMMERCIAL

## 2021-10-25 VITALS
HEART RATE: 96 BPM | WEIGHT: 177.5 LBS | TEMPERATURE: 98 F | OXYGEN SATURATION: 96 % | HEIGHT: 66 IN | SYSTOLIC BLOOD PRESSURE: 147 MMHG | DIASTOLIC BLOOD PRESSURE: 95 MMHG | BODY MASS INDEX: 28.53 KG/M2

## 2021-10-25 DIAGNOSIS — Z11.59 SCREENING FOR VIRAL DISEASE: ICD-10-CM

## 2021-10-25 DIAGNOSIS — J32.9 CHRONIC SINUSITIS, UNSPECIFIED LOCATION: ICD-10-CM

## 2021-10-25 DIAGNOSIS — J32.4 CHRONIC PANSINUSITIS: Primary | ICD-10-CM

## 2021-10-25 DIAGNOSIS — J34.3 HYPERTROPHY OF NASAL TURBINATES: ICD-10-CM

## 2021-10-25 PROCEDURE — G1004 CDSM NDSC: HCPCS | Performed by: INTERNAL MEDICINE

## 2021-10-25 PROCEDURE — 70486 CT MAXILLOFACIAL W/O DYE: CPT | Mod: MG

## 2021-10-25 PROCEDURE — 99213 OFFICE O/P EST LOW 20 MIN: CPT | Performed by: OTOLARYNGOLOGY

## 2021-10-25 PROCEDURE — 70486 CT MAXILLOFACIAL W/O DYE: CPT | Mod: 26,MG | Performed by: INTERNAL MEDICINE

## 2021-10-25 ASSESSMENT — ENCOUNTER SYMPTOMS
SORE THROAT: 0
TROUBLE SWALLOWING: 0
FEVER: 0
CHILLS: 0
SINUS PRESSURE: 0
RHINORRHEA: 0
CONSTITUTIONAL NEGATIVE: 1

## 2021-10-25 NOTE — PROGRESS NOTES
Subjective    CC: f/u sinus treatment    HPI: Tamiko Tiwari is a 46 y.o. female who completed aggressive medical rx for chronic sinusitis. She states she has some residual nasal congestion but overall she is 80% improved.  She did have a CT scan of the sinuses done today as well as one done about a year and a half ago for similar symptoms.  At that point a year and a half ago surgery was recommended but she decided not to proceed at that time.    I reviewed CT scan of the sinuses from today with the patient present.  We compared this to the previous CT sinuses from .  Overall the maxillary sinuses show mild circumferential mucosal thickening with what appears to be obstruction of the ostiomeatal unit bilaterally.  Ethmoid sinuses show right greater than left coalescent mucosal thickening and opacification.  Interval worsening of right sphenoid sinus which is now completely opacified.  Subtotal opacification of the left sphenoid sinus.  Frontal sinuses moderately involved.  Significant inferior turbinate hypertrophy.    Past Medical History:   Diagnosis Date   • Allergic Pollen -    • Depression    • Hypertension    • Hypothyroid    • Iron deficiency 2020   • Severe persistent asthma with acute exacerbation 2020    Eosinophilic asthma; [place]       Past Surgical History:   Procedure Laterality Date   •  SECTION           Current Outpatient Medications:   •  montelukast (SINGULAIR) 10 mg tablet, Take 1 tablet (10 mg total) by mouth nightly, Disp: 30 tablet, Rfl: 11  •  fluticasone propion-salmeteroL (Wixela Inhub) 250-50 mcg/dose diskus inhaler, INHALE 1 PUFF TWICE DAILY RINSE MOUTH TO REDUCE AFTERTASTE DO NOT SWALLOW, Disp: , Rfl:   •  amoxicillin-pot clavulanate (AUGMENTIN) 875-125 mg per tablet, Take 1 tablet by mouth 2 (two) times a day for 21 days, Disp: 42 tablet, Rfl: 0  •  predniSONE 50 mg tablet, Take 1 tablet PO once daily with food for 5 days, Disp: 5 tablet, Rfl: 0  •   spironolactone (ALDACTONE) 25 mg tablet, Take 1 tablet (25 mg total) by mouth daily, Disp: 30 tablet, Rfl: 0  •  levothyroxine (SYNTHROID, LEVOTHROID) 137 mcg tablet, TAKE 1 TABLET BY MOUTH DAILY, Disp: 90 tablet, Rfl: 0  •  drospirenone, contraceptive, (Slynd) 4 mg (28) tablet, Take 4 mg by mouth daily, Disp: 30 tablet, Rfl: 11  •  sertraline (ZOLOFT) 100 mg tablet, TAKE 1 TABLET(100 MG) BY MOUTH DAILY, Disp: 90 tablet, Rfl: 1  •  budesonide (PULMICORT) 0.5 mg/2 mL nebulizer solution, Add 1 respule to 8 ounces saline and irrigate each side of nose twice daily as directed., Disp: , Rfl:   •  albuterol HFA (PROVENTIL HFA;VENTOLIN HFA) 90 mcg/actuation inhaler, Inhale 2 puffs every 2 (two) hours as needed for wheezing (every 2-3 hours as needed for cough/wheezing), Disp: 1 Inhaler, Rfl: 1    Allergies   Allergen Reactions   • Pollen Extracts      Runny nose, congestion       family history includes Alcohol abuse in her maternal grandfather and paternal grandfather; Asthma in her son; Cancer in her mother's sister; Eczema in her son; Heart failure in her maternal grandmother; Hypertension in her brother and mother; Multiple sclerosis in her father's brother; Other in her mother's brother and sister; Pacemaker/AICD in her paternal grandmother; Prostate cancer in her father; Stroke in her mother's brother; Tachycardia in her sister.    Social History     Tobacco Use   • Smoking status: Never Smoker   • Smokeless tobacco: Never Used   Substance Use Topics   • Alcohol use: Yes     Alcohol/week: 0.0 standard drinks     Comment: occ   • Drug use: No       Review of Systems   Constitutional: Negative.  Negative for chills and fever.   HENT: Negative.  Negative for congestion, ear discharge, ear pain, hearing loss, nosebleeds, postnasal drip, rhinorrhea, sinus pressure, sneezing, sore throat, tinnitus and trouble swallowing.        Objective    /95 (BP Location: Left arm, Patient Position: Sitting, Cuff Size: Long Adult)  "  Pulse 96   Temp 36.7 °C (98 °F) (Temporal)   Ht 1.676 m (5' 6\")   Wt 80.5 kg (177 lb 8 oz)   SpO2 96%   BMI 28.65 kg/m²     PHYSICAL EXAMINATION:      GENERAL:  Well-developed, well-nourished.  The patient is alert, oriented and in no acute distress.        PSYCH: Normal mood and affect.        SKIN: No evidence of significant rash or concerning skin lesion on the head or neck.      HEAD/FACE:  Normally formed without evidence of mass or trauma.  The sinuses are nontender.        EARS: Bilateral external ears are normal.  Bilateral external auditory canals are normal.  Bilateral tympanic membranes intact and normal.      NOSE:  The external nose appears normal.  The septum is mostly midline.  The turbinates appear normal.        ORAL CAVITY/OROPHARYNX:  There are no focal masses or lesions.  There are normal mucous membranes.  The tonsils appear normal without mass or focal lesion.  Oropharynx mucosa appears normal.      NECK:  There is no palpable adenopathy, goiter or mass.  The trachea is midline.      Diagnosis    1. Chronic pansinusitis    2. Hypertrophy of nasal turbinates        Recommendations    1.  Patient with chronic pansinusitis.  This has been demonstrated on 2 CT scans of the sinuses done a year and a half apart.  I do recommend she go ahead with endoscopic sinus surgery plus bilateral inferior turbinate reduction.  She states her desire to proceed with surgery as recommended.  The surgery will include all sinuses bilaterally.  Risks benefits and alternatives of endoscopic sinus surgery were discussed with the patient.  These risks include failure to resolve symptoms, bleeding, infection, need for further surgery, damage to surrounding structures including eyes and brain.  The patient states understanding of these risks and their desire to proceed with surgery.  "

## 2021-10-25 NOTE — H&P (VIEW-ONLY)
Subjective    CC: f/u sinus treatment    HPI: Tamiko Tiwari is a 46 y.o. female who completed aggressive medical rx for chronic sinusitis. She states she has some residual nasal congestion but overall she is 80% improved.  She did have a CT scan of the sinuses done today as well as one done about a year and a half ago for similar symptoms.  At that point a year and a half ago surgery was recommended but she decided not to proceed at that time.    I reviewed CT scan of the sinuses from today with the patient present.  We compared this to the previous CT sinuses from .  Overall the maxillary sinuses show mild circumferential mucosal thickening with what appears to be obstruction of the ostiomeatal unit bilaterally.  Ethmoid sinuses show right greater than left coalescent mucosal thickening and opacification.  Interval worsening of right sphenoid sinus which is now completely opacified.  Subtotal opacification of the left sphenoid sinus.  Frontal sinuses moderately involved.  Significant inferior turbinate hypertrophy.    Past Medical History:   Diagnosis Date   • Allergic Pollen -    • Depression    • Hypertension    • Hypothyroid    • Iron deficiency 2020   • Severe persistent asthma with acute exacerbation 2020    Eosinophilic asthma; [place]       Past Surgical History:   Procedure Laterality Date   •  SECTION           Current Outpatient Medications:   •  montelukast (SINGULAIR) 10 mg tablet, Take 1 tablet (10 mg total) by mouth nightly, Disp: 30 tablet, Rfl: 11  •  fluticasone propion-salmeteroL (Wixela Inhub) 250-50 mcg/dose diskus inhaler, INHALE 1 PUFF TWICE DAILY RINSE MOUTH TO REDUCE AFTERTASTE DO NOT SWALLOW, Disp: , Rfl:   •  amoxicillin-pot clavulanate (AUGMENTIN) 875-125 mg per tablet, Take 1 tablet by mouth 2 (two) times a day for 21 days, Disp: 42 tablet, Rfl: 0  •  predniSONE 50 mg tablet, Take 1 tablet PO once daily with food for 5 days, Disp: 5 tablet, Rfl: 0  •   spironolactone (ALDACTONE) 25 mg tablet, Take 1 tablet (25 mg total) by mouth daily, Disp: 30 tablet, Rfl: 0  •  levothyroxine (SYNTHROID, LEVOTHROID) 137 mcg tablet, TAKE 1 TABLET BY MOUTH DAILY, Disp: 90 tablet, Rfl: 0  •  drospirenone, contraceptive, (Slynd) 4 mg (28) tablet, Take 4 mg by mouth daily, Disp: 30 tablet, Rfl: 11  •  sertraline (ZOLOFT) 100 mg tablet, TAKE 1 TABLET(100 MG) BY MOUTH DAILY, Disp: 90 tablet, Rfl: 1  •  budesonide (PULMICORT) 0.5 mg/2 mL nebulizer solution, Add 1 respule to 8 ounces saline and irrigate each side of nose twice daily as directed., Disp: , Rfl:   •  albuterol HFA (PROVENTIL HFA;VENTOLIN HFA) 90 mcg/actuation inhaler, Inhale 2 puffs every 2 (two) hours as needed for wheezing (every 2-3 hours as needed for cough/wheezing), Disp: 1 Inhaler, Rfl: 1    Allergies   Allergen Reactions   • Pollen Extracts      Runny nose, congestion       family history includes Alcohol abuse in her maternal grandfather and paternal grandfather; Asthma in her son; Cancer in her mother's sister; Eczema in her son; Heart failure in her maternal grandmother; Hypertension in her brother and mother; Multiple sclerosis in her father's brother; Other in her mother's brother and sister; Pacemaker/AICD in her paternal grandmother; Prostate cancer in her father; Stroke in her mother's brother; Tachycardia in her sister.    Social History     Tobacco Use   • Smoking status: Never Smoker   • Smokeless tobacco: Never Used   Substance Use Topics   • Alcohol use: Yes     Alcohol/week: 0.0 standard drinks     Comment: occ   • Drug use: No       Review of Systems   Constitutional: Negative.  Negative for chills and fever.   HENT: Negative.  Negative for congestion, ear discharge, ear pain, hearing loss, nosebleeds, postnasal drip, rhinorrhea, sinus pressure, sneezing, sore throat, tinnitus and trouble swallowing.        Objective    /95 (BP Location: Left arm, Patient Position: Sitting, Cuff Size: Long Adult)  "  Pulse 96   Temp 36.7 °C (98 °F) (Temporal)   Ht 1.676 m (5' 6\")   Wt 80.5 kg (177 lb 8 oz)   SpO2 96%   BMI 28.65 kg/m²     PHYSICAL EXAMINATION:      GENERAL:  Well-developed, well-nourished.  The patient is alert, oriented and in no acute distress.        PSYCH: Normal mood and affect.        SKIN: No evidence of significant rash or concerning skin lesion on the head or neck.      HEAD/FACE:  Normally formed without evidence of mass or trauma.  The sinuses are nontender.        EARS: Bilateral external ears are normal.  Bilateral external auditory canals are normal.  Bilateral tympanic membranes intact and normal.      NOSE:  The external nose appears normal.  The septum is mostly midline.  The turbinates appear normal.        ORAL CAVITY/OROPHARYNX:  There are no focal masses or lesions.  There are normal mucous membranes.  The tonsils appear normal without mass or focal lesion.  Oropharynx mucosa appears normal.      NECK:  There is no palpable adenopathy, goiter or mass.  The trachea is midline.      Diagnosis    1. Chronic pansinusitis    2. Hypertrophy of nasal turbinates        Recommendations    1.  Patient with chronic pansinusitis.  This has been demonstrated on 2 CT scans of the sinuses done a year and a half apart.  I do recommend she go ahead with endoscopic sinus surgery plus bilateral inferior turbinate reduction.  She states her desire to proceed with surgery as recommended.  The surgery will include all sinuses bilaterally.  Risks benefits and alternatives of endoscopic sinus surgery were discussed with the patient.  These risks include failure to resolve symptoms, bleeding, infection, need for further surgery, damage to surrounding structures including eyes and brain.  The patient states understanding of these risks and their desire to proceed with surgery.  "

## 2021-10-27 ENCOUNTER — OFFICE VISIT (OUTPATIENT)
Dept: FAMILY MEDICINE | Facility: CLINIC | Age: 46
End: 2021-10-27
Payer: COMMERCIAL

## 2021-10-27 ENCOUNTER — APPOINTMENT (OUTPATIENT)
Dept: LAB | Facility: CLINIC | Age: 46
End: 2021-10-27
Payer: COMMERCIAL

## 2021-10-27 VITALS
HEIGHT: 66 IN | WEIGHT: 181 LBS | SYSTOLIC BLOOD PRESSURE: 144 MMHG | HEART RATE: 91 BPM | OXYGEN SATURATION: 97 % | TEMPERATURE: 97.7 F | BODY MASS INDEX: 29.09 KG/M2 | DIASTOLIC BLOOD PRESSURE: 88 MMHG

## 2021-10-27 DIAGNOSIS — J32.4 CHRONIC PANSINUSITIS: Primary | ICD-10-CM

## 2021-10-27 DIAGNOSIS — J45.40 MODERATE PERSISTENT ASTHMA WITHOUT COMPLICATION: ICD-10-CM

## 2021-10-27 DIAGNOSIS — J34.3 HYPERTROPHY OF NASAL TURBINATES: ICD-10-CM

## 2021-10-27 DIAGNOSIS — Z79.899 HIGH RISK MEDICATION USE: ICD-10-CM

## 2021-10-27 LAB
ANION GAP SERPL CALC-SCNC: 7 MMOL/L (ref 3–11)
BASOPHILS # BLD AUTO: 0.1 10*3/UL
BASOPHILS NFR BLD AUTO: 2 % (ref 0–2)
BUN SERPL-MCNC: 10 MG/DL (ref 7–25)
CALCIUM SERPL-MCNC: 8.6 MG/DL (ref 8.6–10.3)
CHLORIDE SERPL-SCNC: 105 MMOL/L (ref 98–107)
CO2 SERPL-SCNC: 25 MMOL/L (ref 21–32)
CREAT SERPL-MCNC: 1.07 MG/DL (ref 0.6–1.1)
EOSINOPHIL # BLD AUTO: 0.4 10*3/UL
EOSINOPHIL NFR BLD AUTO: 5 % (ref 0–3)
ERYTHROCYTE [DISTWIDTH] IN BLOOD BY AUTOMATED COUNT: 16.9 % (ref 11.5–14)
GFR SERPL CREATININE-BSD FRML MDRD: 62 ML/MIN/1.73M*2
GLUCOSE SERPL-MCNC: 94 MG/DL (ref 70–105)
HCT VFR BLD AUTO: 39.3 % (ref 34–45)
HGB BLD-MCNC: 12.6 G/DL (ref 11.5–15.5)
LYMPHOCYTES # BLD AUTO: 2.3 10*3/UL
LYMPHOCYTES NFR BLD AUTO: 32 % (ref 11–47)
MCH RBC QN AUTO: 27.1 PG (ref 28–33)
MCHC RBC AUTO-ENTMCNC: 32.2 G/DL (ref 32–36)
MCV RBC AUTO: 84 FL (ref 81–97)
MONOCYTES # BLD AUTO: 0.7 10*3/UL
MONOCYTES NFR BLD AUTO: 10 % (ref 3–11)
NEUTROPHILS # BLD AUTO: 3.7 10*3/UL
NEUTROPHILS NFR BLD AUTO: 51 % (ref 41–81)
PLATELET # BLD AUTO: 357 10*3/UL (ref 140–350)
PMV BLD AUTO: 8.1 FL (ref 6.9–10.8)
POTASSIUM SERPL-SCNC: 3.8 MMOL/L (ref 3.5–5.1)
RBC # BLD AUTO: 4.67 10*6/ΜL (ref 3.7–5.3)
SODIUM SERPL-SCNC: 137 MMOL/L (ref 135–145)
WBC # BLD AUTO: 7.3 10*3/UL (ref 4.5–10.5)

## 2021-10-27 PROCEDURE — 80048 BASIC METABOLIC PNL TOTAL CA: CPT | Performed by: FAMILY MEDICINE

## 2021-10-27 PROCEDURE — 85025 COMPLETE CBC W/AUTO DIFF WBC: CPT | Performed by: FAMILY MEDICINE

## 2021-10-27 PROCEDURE — 99214 OFFICE O/P EST MOD 30 MIN: CPT | Performed by: FAMILY MEDICINE

## 2021-10-27 PROCEDURE — 36415 COLL VENOUS BLD VENIPUNCTURE: CPT | Performed by: FAMILY MEDICINE

## 2021-10-27 ASSESSMENT — PAIN SCALES - GENERAL: PAINLEVEL: 0-NO PAIN

## 2021-10-27 NOTE — PROGRESS NOTES
PRE-OP EVALUATION:    Chief Complaint   Patient presents with   • Pre-op Exam     21 ENT         Tamiko Tiwari  : 1975    HPI:  Tamiko Tiwari is a 46 y.o. female presents for pre-operative evaluation as requested by Dr. Carmen.  She requires evaluation and anesthesia clearance prior to undergoing surgery/procedure for treatment of chronic sinusitis.  Proposed procedure: .    FUNCTIONAL ENDOSCOPIC SINUS SURGERY WITH COMPUTER NAVIGATION - BILATERAL MAXILLAY and  ANTERIOR/POSTERIOR ETHMOID and SPHENOID  AND FRONTAL and BILATERAL INFERIOR TURBINATE REDUCTION (2 HR) Bilateral General   BILATERAL INFERIOR TURBINATE REDUCTION        Due to her chronic medical problems, I have been asked to see the patient for a pre-operative evaluation.      Date of Surgery/Procedure: 2021  Hospital/Surgical Facility: [facility]  Primary Care Physician:  Jacinda Duran MD   Type of Anesthesia Anticipated: General  Preoperative Considerations:  • Alcohol or substance use/abuse/dependence:  No  • Aortic stenosis  No  • Atrial fibrillation  No  • CHF  No  • MI in the past 6 months  No  • Anesthesia complications, personal or family  No  • Anticoagulation  No  • Bleeding tendency, personal or family history  No  • Current or recent illness  No  • Recent steroids  Yes, describe: was on prednisone x 5 days; finished 2+ weeks ago.  • C spine concerns (RA)  No  • Asthma or COPD  Yes, describe: asthma well controlled  • Current smoker  No  • Diabetes  No  • At risk for Delirium  No  • Hepatitis, HIV, blood transfusion  No  • Medications requiring perioperative management (steroids, insulin, metformin, HRT)  No  • MEDHAT  No  • DVT prophylaxis  No  • CKD  No  • Liver disease/cirrhosis  No        Her chronic medical problems include menorrhagia with iron deficiency.  She is potentially considering intervention on this later in the year and will continue with her hormones to help regulate her bleeding at this time.   CBC is obtained today and shows no anemia at this time.  She does have microcytic indices with her MCH being a bit low.  She will follow-up with gynecology after she is recovered from her sinus surgery.    She has eosinophilic asthma, currently well controlled with Wixela and Singulair.  She has very rare use of her rescue inhaler.  Last dose used was 2 weeks ago when she shoveled snow and she averages about 2 uses of her rescue inhaler per year.     She has hypertension, typically well controlled on spironolactone 25 mg daily.  Kidney function is normal.  We may need to consider a dose increase, depending on home blood pressure readings.  Continue to do home monitoring.      Patient Active Problem List   Diagnosis   • Acquired hypothyroidism   • Essential hypertension   • Left shoulder pain   • Vitamin D deficiency   • Cough   • Sensorineural hearing loss (SNHL) of both ears   • Severe persistent asthma with acute exacerbation   • Excessive bleeding in premenopausal period   • Iron deficiency   • Chronic pansinusitis   • Hypertrophy of nasal turbinates       PAST MEDICAL HISTORY:  Past Medical History:   Diagnosis Date   • Allergic Pollen - 2019   • Depression    • Hypertension    • Hypothyroid    • Iron deficiency 9/25/2020   • Severe persistent asthma with acute exacerbation 08/07/2020    Eosinophilic asthma; [place]       IMMUNIZATION HISTORY:  Immunization History   Administered Date(s) Administered   • Fluarix/Flulaval/Fluzone Quad 11/02/2020   • Influenza TIV (IM) 10/09/2002, 11/09/2005, 11/02/2007, 10/30/2008, 10/08/2009, 10/05/2010, 09/28/2011, 10/01/2012, 10/29/2013, 10/31/2014, 10/26/2015, 10/19/2016   • Influenza, Unspecified 08/01/2017   • MMR 11/28/2014   • PFIZER-BIONTECH SARS-COV-2 VACCINATION 02/04/2021, 02/25/2021   • Pneumococcal Polysaccharide - PPSV23 09/24/2020   • Td 08/10/2013   • Tdap 08/10/2013   • Tetanus 08/01/2013   • Zoster SQ 11/28/2014       PAST SURGICAL HISTORY:  Past Surgical History:    Procedure Laterality Date   •  SECTION         FAMILY MEDICAL HISTORY:  Family Status   Relation Name Status   • Father Rudy Alive, age 77y   • Mother Izabela Alive, age 76y   • Sister Macy Alive, age 54y   • Brother Vinnie Alive, age 52y   • Maternal GM Lissy  at age 60s   • Maternal GF Henrik  at age 80s   • Paternal GM Martha Alive, age 97y   • Paternal GF Rudy  at age 90s   • Sister Lucia Alive, age 50y   • Pat Uncle  (Not Specified)   • Mat Aunt Pat (Not Specified)   • Mat Uncle  (Not Specified)   • Mat Uncle Shaggy (Not Specified)   • Son  Alive      Family History   Problem Relation Age of Onset   • Prostate cancer Father    • Hypertension Mother    • Other Sister         Hysterectomy due to benign tumors    • Hypertension Brother    • Heart failure Maternal Grandmother    • Alcohol abuse Maternal Grandfather    • Pacemaker/AICD Paternal Grandmother    • Alcohol abuse Paternal Grandfather    • Tachycardia Sister    • Multiple sclerosis Father's Brother    • Cancer Mother's Sister         unsure of what type   • Other Mother's Brother    • Stroke Mother's Brother    • Asthma Son    • Eczema Son          SOCIAL HISTORY:  Social History     Tobacco Use   • Smoking status: Never Smoker   • Smokeless tobacco: Never Used   Substance Use Topics   • Alcohol use: Yes     Alcohol/week: 0.0 standard drinks     Comment: occ       DRUG HISTORY:  Social History     Substance and Sexual Activity   Drug Use No       HOME MEDICATIONS:  Current Outpatient Medications   Medication Sig Dispense Refill   • montelukast (SINGULAIR) 10 mg tablet Take 1 tablet (10 mg total) by mouth nightly 30 tablet 11   • fluticasone propion-salmeteroL (Wixela Inhub) 250-50 mcg/dose diskus inhaler INHALE 1 PUFF TWICE DAILY RINSE MOUTH TO REDUCE AFTERTASTE DO NOT SWALLOW     • spironolactone (ALDACTONE) 25 mg tablet Take 1 tablet (25 mg total) by mouth daily 30 tablet 0   • levothyroxine (SYNTHROID, LEVOTHROID) 137  "mcg tablet TAKE 1 TABLET BY MOUTH DAILY 90 tablet 0   • drospirenone, contraceptive, (Slynd) 4 mg (28) tablet Take 4 mg by mouth daily 30 tablet 11   • sertraline (ZOLOFT) 100 mg tablet TAKE 1 TABLET(100 MG) BY MOUTH DAILY 90 tablet 1   • albuterol HFA (PROVENTIL HFA;VENTOLIN HFA) 90 mcg/actuation inhaler Inhale 2 puffs every 2 (two) hours as needed for wheezing (every 2-3 hours as needed for cough/wheezing) 1 Inhaler 1     No current facility-administered medications for this visit.        ALLERGIES:  Allergies   Allergen Reactions   • Pollen Extracts      Runny nose, congestion        Latex Allergy: No    REVIEW OF SYSTEMS:  General ROS: weight is stable; no recent fevers or other illness.  ENT ROS: denies sore throat or any loose/chipped teeth.  Positive for chronic sinusitis.  Respiratory ROS: no cough, shortness of breath, or wheezing  Cardiovascular ROS: no chest pain or dyspnea on exertion  Gastrointestinal ROS: no abdominal pain, change in bowel habits, or black or bloody stools  Genito-Urinary ROS: no dysuria, trouble voiding, or hematuria; LMP- current.  On OCP.  Neurological ROS: negative for headaches  Dermatological ROS: no rash, open skin areas, jaundice.  Musculoskeletal: negative  Psychological ROS: negative      VITALS:   /88 (BP Location: Left arm, Patient Position: Sitting, Cuff Size: Regular Adult)   Pulse 91   Temp 36.5 °C (97.7 °F) (Temporal)   Ht 1.676 m (5' 6\")   Wt 82.1 kg (181 lb)   SpO2 97%   BMI 29.21 kg/m²    Body mass index is 29.21 kg/m².    PHYSICAL EXAM:  General appearance: in no apparent distress.  Psych:  Affect/mood normal.  Eye exam - conjunctiva clear.  ENT exam reveals -TMs are normal, pharynx is clear with teeth in good condition.  Neck exam - supple and no adenopathy.  Thyroid exam-thyroid is normal in size without nodules or tenderness.  Respiratory exam reveals: normal inspiratory/expiratory effort; no wheezes or rhonchi.  CVS exam: RRR with no murmur audible; " no JVD.  Abdominal exam: soft, non-tender without hepatosplenomegaly or mass  Exam of extremities: no pedal edema noted  Skin exam - normal coloration and turgor, no rashes, no suspicious skin lesions noted.      DIAGNOSTICS:  Recent Results (from the past 48 hour(s))   Basic metabolic panel Blood, Venous    Collection Time: 10/27/21  1:28 PM   Result Value Ref Range    Sodium 137 135 - 145 mmol/L    Potassium 3.8 3.5 - 5.1 MMOL/L    Chloride 105 98 - 107 mmol/L    CO2 25 21 - 32 mmol/L    BUN 10 7 - 25 mg/dL    Creatinine 1.07 0.60 - 1.10 mg/dL    Glucose 94 70 - 105 mg/dL    Calcium 8.6 8.6 - 10.3 mg/dL    Anion Gap 7 3 - 11 mmol/L    eGFR 62 >60 mL/min/1.73m*2   CBC w/auto differential Blood, Venous    Collection Time: 10/27/21  1:28 PM   Result Value Ref Range    WBC 7.3 4.5 - 10.5 10*3/uL    RBC 4.67 3.70 - 5.30 10*6/µL    Hemoglobin 12.6 11.5 - 15.5 g/dL    Hematocrit 39.3 34.0 - 45.0 %    MCV 84.0 81.0 - 97.0 fL    MCH 27.1 (L) 28.0 - 33.0 pg    MCHC 32.2 32.0 - 36.0 g/dL    RDW 16.9 (H) 11.5 - 14.0 %    Platelets 357 (H) 140 - 350 10*3/uL    MPV 8.1 6.9 - 10.8 fL    Neutrophils% 51 41 - 81 %    Lymphocytes% 32 11 - 47 %    Monocytes% 10 3 - 11 %    Eosinophils% 5 (H) 0 - 3 %    Basophils% 2 0 - 2 %    ANC (auto diff) 3.70 10*3/UL    Lymphocytes Absolute 2.30 10*3/uL    Monocytes Absolute 0.70 10*3/uL    Eosinophils Absolute 0.40 10*3/uL    Basophils Absolute 0.10 10*3/uL         RCRI:  RCI RISK CLASS I (0 risk factors, risk of major cardiac compl. appr. 0.5%)    IMPRESSION:   Diagnosis Plan   1. Chronic pansinusitis  Ambulatory referral to Family Practice   2. Hypertrophy of nasal turbinates  Ambulatory referral to [facility]   3. High risk medication use  Basic metabolic panel Blood, Venous    CBC w/auto differential Blood, Venous   4. Moderate persistent asthma without complication           RECOMMENDATIONS:  Proceed with surgery as planned.    She will have Covid testing the week prior to her  surgery.    Risks of surgery discussed with the patient by the surgeon.  She is medically optimized to undergo the proposed procedure.    A copy of this dictation is forwarded to the requesting physician.    Jacinda Duran MD

## 2021-11-01 NOTE — TELEPHONE ENCOUNTER
Called and spoke to patient. Informed patient that we do not have any openings today. Patient recently prescribed an abx by urgent care for a sinus infection that has been having symptoms for the last 2 months. Patient does not feel as if she is getting any better. Patient is doing supportive measures such as benadryl and saline rinses without relief. Spoke to Sahara Montoya CNP and stated that we can put in a referral to ENT if patient would like due to the length of sinus issue complaint. Patient would like to see ENT. Will place referral.    87

## 2021-11-09 ENCOUNTER — ANESTHESIA EVENT (OUTPATIENT)
Dept: GASTROENTEROLOGY | Facility: HOSPITAL | Age: 46
End: 2021-11-09
Payer: COMMERCIAL

## 2021-11-09 NOTE — ANESTHESIA PREPROCEDURE EVALUATION
"Pre-Procedure Assessment    Patient: Tamiko Tiwari, female, 46 y.o.    Ht Readings from Last 1 Encounters:   11/17/21 1.676 m (5' 6\")     Wt Readings from Last 1 Encounters:   11/17/21 79.8 kg (176 lb)       Last Vitals  /96 (11/17/21 0818)    Temp 36.9 °C (98.5 °F) (11/17/21 0818)    Pulse 83 (11/17/21 0818)   Resp 16 (11/17/21 0818)    SpO2 96 % (11/17/21 0818)    Pain Score 0 - No pain (11/17/21 0818)       Problem list reviewed and Medical history reviewed           Airway   Mallampati: II  TM distance: >3 FB  Neck ROM: full      Dental - normal exam     Pulmonary - normal exam    breath sounds clear to auscultation  (+) asthma,   Cardiovascular - normal exam  Exercise tolerance: good (4-7 METS)  (+) hypertension well controlled,     Rhythm: regular  Rate: normal    Mental Status/Neuro/Psych    Pt is alert.      (+) psychiatric history (Depression.),     GI/Hepatic/Renal - negative ROS     Endo/Other    (+) hypothyroidism,   Abdominal  - normal exam          Social History     Tobacco Use   • Smoking status: Never Smoker   • Smokeless tobacco: Never Used   Substance Use Topics   • Alcohol use: Yes     Alcohol/week: 0.0 standard drinks     Comment: occ      Hematology   WBC   Date Value Ref Range Status   10/27/2021 7.3 4.5 - 10.5 10*3/uL Final     RBC   Date Value Ref Range Status   10/27/2021 4.67 3.70 - 5.30 10*6/µL Final     MCV   Date Value Ref Range Status   10/27/2021 84.0 81.0 - 97.0 fL Final     Hemoglobin   Date Value Ref Range Status   10/27/2021 12.6 11.5 - 15.5 g/dL Final     Hematocrit   Date Value Ref Range Status   10/27/2021 39.3 34.0 - 45.0 % Final     Platelets   Date Value Ref Range Status   10/27/2021 357 (H) 140 - 350 10*3/uL Final      Coagulation No results found for: PT, APTT, INR   General Chemistry   Calcium   Date Value Ref Range Status   10/27/2021 8.6 8.6 - 10.3 mg/dL Final     BUN   Date Value Ref Range Status   10/27/2021 10 7 - 25 mg/dL Final     Creatinine   Date Value " Ref Range Status   10/27/2021 1.07 0.60 - 1.10 mg/dL Final     Glucose   Date Value Ref Range Status   10/27/2021 94 70 - 105 mg/dL Final     Sodium   Date Value Ref Range Status   10/27/2021 137 135 - 145 mmol/L Final     Potassium   Date Value Ref Range Status   10/27/2021 3.8 3.5 - 5.1 MMOL/L Final     Magnesium   Date Value Ref Range Status   05/31/2018 2.1 1.8 - 2.4 mg/dL Final     CO2   Date Value Ref Range Status   10/27/2021 25 21 - 32 mmol/L Final     Chloride   Date Value Ref Range Status   10/27/2021 105 98 - 107 mmol/L Final     Anesthesia Plan    ASA 2   NPO status reviewed: > 8 hours    General         Induction: intravenous   Airway Planning: oral ET tube          Plan for postoperative opioid use.    Anesthetic plan and risks discussed with patient and spouse.  Use of blood products discussed with patient and spouse who consented to blood products.

## 2021-11-10 ENCOUNTER — APPOINTMENT (OUTPATIENT)
Dept: LAB | Facility: CLINIC | Age: 46
End: 2021-11-10
Payer: COMMERCIAL

## 2021-11-10 DIAGNOSIS — Z11.59 SCREENING FOR VIRAL DISEASE: ICD-10-CM

## 2021-11-10 PROCEDURE — 87635 SARS-COV-2 COVID-19 AMP PRB: CPT | Performed by: INTERNAL MEDICINE

## 2021-11-10 PROCEDURE — U0005 INFEC AGEN DETEC AMPLI PROBE: HCPCS | Performed by: INTERNAL MEDICINE

## 2021-11-10 PROCEDURE — 99211 OFF/OP EST MAY X REQ PHY/QHP: CPT | Mod: CS,LAB | Performed by: INTERNAL MEDICINE

## 2021-11-11 ENCOUNTER — APPOINTMENT (OUTPATIENT)
Dept: LAB | Facility: CLINIC | Age: 46
End: 2021-11-11
Payer: COMMERCIAL

## 2021-11-11 DIAGNOSIS — N83.299 COMPLEX OVARIAN CYST: ICD-10-CM

## 2021-11-11 DIAGNOSIS — N83.00 CYST, OVARY, FOLLICULAR: Primary | ICD-10-CM

## 2021-11-11 DIAGNOSIS — N83.00 CYST, OVARY, FOLLICULAR: ICD-10-CM

## 2021-11-11 LAB
CANCER AG125 SERPL-ACNC: 32.6 U/ML (ref 0–35.9)
SARS-COV-2 RNA RESP QL NAA+PROBE: NEGATIVE

## 2021-11-11 PROCEDURE — 86304 IMMUNOASSAY TUMOR CA 125: CPT | Performed by: INTERNAL MEDICINE

## 2021-11-11 PROCEDURE — 36415 COLL VENOUS BLD VENIPUNCTURE: CPT | Performed by: INTERNAL MEDICINE

## 2021-11-11 NOTE — PROGRESS NOTES
Called patient with normal CA-125 and u/s concerning for complex cyst now with mural nodules, possible borderline vs. Malignancy. Imaging was reviewed with Dr. Cao who recommends surgical removal with frozen section. I discussed doing surgery with frozen section and Dr. Cao on call if needed for staging with patient vs. Dr. Cao performing the surgery and that she will likely be able to get in quicker with Dr. Cao and she desires surgery sooner so would like referral.    Nina Isaacs, DO

## 2021-11-16 ENCOUNTER — OFFICE VISIT (OUTPATIENT)
Dept: ONCOLOGY | Facility: CLINIC | Age: 46
End: 2021-11-16
Payer: COMMERCIAL

## 2021-11-16 VITALS
TEMPERATURE: 98.7 F | OXYGEN SATURATION: 97 % | DIASTOLIC BLOOD PRESSURE: 74 MMHG | WEIGHT: 181 LBS | HEART RATE: 88 BPM | SYSTOLIC BLOOD PRESSURE: 173 MMHG | BODY MASS INDEX: 29.09 KG/M2 | HEIGHT: 66 IN | RESPIRATION RATE: 16 BRPM

## 2021-11-16 DIAGNOSIS — N94.89 ADNEXAL MASS: ICD-10-CM

## 2021-11-16 DIAGNOSIS — N83.299 COMPLEX OVARIAN CYST: ICD-10-CM

## 2021-11-16 DIAGNOSIS — N92.1 MENOMETRORRHAGIA: ICD-10-CM

## 2021-11-16 PROCEDURE — G0463 HOSPITAL OUTPT CLINIC VISIT: HCPCS

## 2021-11-16 PROCEDURE — 99205 OFFICE O/P NEW HI 60 MIN: CPT | Performed by: OBSTETRICS & GYNECOLOGY

## 2021-11-16 ASSESSMENT — ENCOUNTER SYMPTOMS
VOMITING: 0
DYSURIA: 0
SHORTNESS OF BREATH: 0
DIFFICULTY URINATING: 0
ABDOMINAL PAIN: 0
CONSTIPATION: 0
ABDOMINAL DISTENTION: 0
NAUSEA: 0
DIARRHEA: 0
CHEST TIGHTNESS: 0

## 2021-11-16 ASSESSMENT — PAIN SCALES - GENERAL: PAINLEVEL: 0-NO PAIN

## 2021-11-16 NOTE — ASSESSMENT & PLAN NOTE
· We discussed the range of possibilities including benign, borderline, and malignant masses.  Because of the nodularity seen, the mass is more concerning for a borderline rather than this being a truly benign mass.  Malignancy also remains in the differential  · We discussed that a borderline tumor is what it sounds like and that it is not a fulminant cancer but it is not completely benign.  We discussed that there is risk of recurrence particularly if the other ovary is left in place.  In her situation, I would consider leaving the left ovary if it appeared completely normal even with a borderline tumor in the right ovary as the risk of recurrence is anywhere between 10 and 30%.  Additionally a normal CA-125 is possible in an early stage cancer or in a borderline tumor and so that being normal does not completely rule out the possibility of a cancer.  · Based on the currently available information, we discussed that the best option would be to proceed with a hysterectomy for management of her likely perimenopausal bleeding, as well as a right salpingo-oophorectomy and left salpingectomy.  I would plan to send the uterus and right ovary for frozen section.  We discussed that if a borderline tumor was identified in the left ovary was normal, I would leave the left ovary behind and take an omental biopsy.  If there was evidence of a cancer, I would perform a staging operation to include an omentectomy and removal of the bilateral pelvic nodes and periaortic lymph nodes.  · If on diagnostic laparoscopy there is evidence of extensive malignancy, then a laparotomy may be required.  We did discuss that this could be staged laparoscopically, and I would only open in the event that it was necessary to perform an optimal surgery.  · She is scheduled to have sinus surgery tomorrow and I spoke with Dr. Carmen who will be performing her surgery.  He recommended no wait time following the sinus surgery and is okay with us  proceeding following Thanksgiving on November 29 which is Ms. Tiwari's preferred date.  · Have posted this in the main operating room and communicated this with her and her .

## 2021-11-16 NOTE — ASSESSMENT & PLAN NOTE
· She has had an adequate trial of oral contraceptives without improvement  · She desires definitive management and does not have any desire for future childbearing.  For this reason I think it is very reasonable to perform a hysterectomy as it will definitively manage her heavy bleeding  · We reviewed the risks and benefits of proceeding with a hysterectomy including particularly risk of injury to bladder or ureters with her prior  section

## 2021-11-16 NOTE — H&P (VIEW-ONLY)
[facility]  Gynecologic Oncology  New Patient History & Physical    Referring Provider: Nina Isaacs, DO  [address] [place] [address]    Chief Complaint: Complex right ovarian cyst    History of Present Illness:   Tamiko Tiwari is a 46 y.o.  here today for evaluation of right adnexal mass.  This was originally seen on CT scan 2021 and has been followed on ultrasound.  The mass has been relatively stable in size, it is increased from 3.6 cm to about 4 cm and there is nodularity within it that seems to be increasing since the last ultrasound in July.  Dr. Isaacs and I reviewed the images.  Her CA-125 has been completely normal but because of the appearance, a frozen section will be warranted intraoperatively and so the procedure really should be done here in [place].  Because of OR availability, I was able to get Ms. Tiwari into the operating room sooner here so she is referred for me to evaluate and for us to make a plan to go to the operating room.    She has been having some significant heavy menstrual bleeding and irregular cycles likely due to perimenopausal bleeding.  She has had a trial of oral contraceptive pills without any improvement.  She previously had an endometrial biopsy that was reportedly normal.  And has been on birth control pills.    Review of Systems   Review of Systems   HENT:   Positive for hearing loss.    Respiratory: Negative for chest tightness and shortness of breath.    Gastrointestinal: Negative for abdominal distention, abdominal pain, constipation, diarrhea, nausea and vomiting.   Genitourinary: Positive for vaginal bleeding. Negative for difficulty urinating and dysuria.    All other systems reviewed and are negative.     Past Medical History:  Past Medical History:   Diagnosis Date   • Allergic Pollen - 2019   • Depression    • Endocrine disorder    • Hypertension    • Hypothyroid    • Iron deficiency 2020   • Psychiatric illness     depression    • Severe persistent asthma with acute exacerbation 2020    Eosinophilic asthma; [place]       Current Medications:    Current Outpatient Medications:   •  montelukast (SINGULAIR) 10 mg tablet, Take 1 tablet (10 mg total) by mouth nightly, Disp: 30 tablet, Rfl: 11  •  fluticasone propion-salmeteroL (Wixela Inhub) 250-50 mcg/dose diskus inhaler, INHALE 1 PUFF TWICE DAILY RINSE MOUTH TO REDUCE AFTERTASTE DO NOT SWALLOW, Disp: , Rfl:   •  spironolactone (ALDACTONE) 25 mg tablet, Take 1 tablet (25 mg total) by mouth daily, Disp: 30 tablet, Rfl: 0  •  levothyroxine (SYNTHROID, LEVOTHROID) 137 mcg tablet, TAKE 1 TABLET BY MOUTH DAILY, Disp: 90 tablet, Rfl: 0  •  sertraline (ZOLOFT) 100 mg tablet, TAKE 1 TABLET(100 MG) BY MOUTH DAILY, Disp: 90 tablet, Rfl: 1  •  albuterol HFA (PROVENTIL HFA;VENTOLIN HFA) 90 mcg/actuation inhaler, Inhale 2 puffs every 2 (two) hours as needed for wheezing (every 2-3 hours as needed for cough/wheezing), Disp: 1 Inhaler, Rfl: 1  •  drospirenone, contraceptive, (Slynd) 4 mg (28) tablet, Take 4 mg by mouth daily (Patient not taking: Reported on 2021 ), Disp: 30 tablet, Rfl: 11     Allergies:  The patient is allergic to pollen extracts.    Past Surgical History:  Past Surgical History:   Procedure Laterality Date   •  SECTION         Family History:  Cancer-related family history includes Cancer in her mother's sister; Prostate cancer in her father. There is no history of Ovarian cancer, Pancreatic cancer, Melanoma, Breast cancer, or Uterine cancer.    Social History:  Social History     Tobacco Use   • Smoking status: Never Smoker   • Smokeless tobacco: Never Used   Vaping Use   • Vaping Use: Never used   Substance Use Topics   • Alcohol use: Yes     Alcohol/week: 0.0 standard drinks     Comment: occ   • Drug use: No       OB/GYN History: 1 SAB, 1 C/S, no h/o abnormal pap smears    Physical Exam:  Vitals:    21 1446   BP: 173/74   Temp: 37.1 °C (98.7 °F)   Pulse: 88  "  Resp: 16   SpO2: 97%   Height: 1.676 m (5' 6\")   Weight: 82.1 kg (181 lb)   PainSc: 0-No pain     Weights (last 180 days)     Date/Time Weight    21 1446 82.1 kg (181 lb)        ECOG Performance Status: 0    Physical Exam  Constitutional:       Appearance: Normal appearance.   HENT:      Head: Normocephalic.   Cardiovascular:      Rate and Rhythm: Normal rate and regular rhythm.      Pulses: Normal pulses.      Heart sounds: Normal heart sounds.   Pulmonary:      Effort: Pulmonary effort is normal.      Breath sounds: Normal breath sounds. No wheezing, rhonchi or rales.   Abdominal:      General: Abdomen is flat. There is no distension.      Palpations: Abdomen is soft. There is no mass.      Tenderness: There is no abdominal tenderness. There is no guarding or rebound.      Hernia: No hernia is present.      Comments: Transverse incision is well-healed from prior  section   Genitourinary:     General: Normal vulva.      Comments: The cervix is grossly normal in appearance and there is brown blood in the vaginal vault.  The uterus feels adhesed to the anterior abdominal wall on exam.  There are no palpable adnexal masses  Musculoskeletal:      Cervical back: Normal range of motion.   Skin:     General: Skin is warm and dry.   Neurological:      General: No focal deficit present.      Mental Status: She is alert and oriented to person, place, and time.   Psychiatric:         Mood and Affect: Mood normal.         Behavior: Behavior normal.         Thought Content: Thought content normal.         Judgment: Judgment normal.        Labs:  Lab Results   Component Value Date    WBC 7.3 10/27/2021    NEUTROABS 3.70 10/27/2021    HGB 12.6 10/27/2021     (H) 10/27/2021    K 3.8 10/27/2021    CREATININE 1.07 10/27/2021    BILITOT 0.35 2021    ALKPHOS 73 2021    AST 18 2021    ALT 12 2021    CALCIUM 8.6 10/27/2021     Lab Results   Component Value Date     32.6 2021 "       Imaging Reports:  TVUS 2021:  IMPRESSION:   Slight interval enlargement of the mildly complex right ovarian cyst with appearance of increased mural nodularity. Neoplasm must be considered.    Assessment:  Tamiko Tiwari is a 46 y.o. with a complex right adnexal mass and heavy and abnormal menstrual bleeding here today to discuss treatment options. After discussion, we have developed the following plan with shared decision making.    Plan:  Menometrorrhagia  · She has had an adequate trial of oral contraceptives without improvement  · She desires definitive management and does not have any desire for future childbearing.  For this reason I think it is very reasonable to perform a hysterectomy as it will definitively manage her heavy bleeding  · We reviewed the risks and benefits of proceeding with a hysterectomy including particularly risk of injury to bladder or ureters with her prior  section    Adnexal mass  · We discussed the range of possibilities including benign, borderline, and malignant masses.  Because of the nodularity seen, the mass is more concerning for a borderline rather than this being a truly benign mass.  Malignancy also remains in the differential  · We discussed that a borderline tumor is what it sounds like and that it is not a fulminant cancer but it is not completely benign.  We discussed that there is risk of recurrence particularly if the other ovary is left in place.  In her situation, I would consider leaving the left ovary if it appeared completely normal even with a borderline tumor in the right ovary as the risk of recurrence is anywhere between 10 and 30%.  Additionally a normal CA-125 is possible in an early stage cancer or in a borderline tumor and so that being normal does not completely rule out the possibility of a cancer.  · Based on the currently available information, we discussed that the best option would be to proceed with a hysterectomy for management  of her likely perimenopausal bleeding, as well as a right salpingo-oophorectomy and left salpingectomy.  I would plan to send the uterus and right ovary for frozen section.  We discussed that if a borderline tumor was identified in the left ovary was normal, I would leave the left ovary behind and take an omental biopsy.  If there was evidence of a cancer, I would perform a staging operation to include an omentectomy and removal of the bilateral pelvic nodes and periaortic lymph nodes.  · If on diagnostic laparoscopy there is evidence of extensive malignancy, then a laparotomy may be required.  We did discuss that this could be staged laparoscopically, and I would only open in the event that it was necessary to perform an optimal surgery.  · She is scheduled to have sinus surgery tomorrow and I spoke with Dr. Carmen who will be performing her surgery.  He recommended no wait time following the sinus surgery and is okay with us proceeding following Thanksgiving on November 29 which is Ms. Tiwari's preferred date.  · Have posted this in the main operating room and communicated this with her and her .    All questions answered to patient's apparent satisfaction.    On this date of service 60 minutes of total time was spent on this encounter.     I would like to thank Dr Isaacs  for this consultation.     This note was generated using voice recognition software. Inadvertent word errors may have occurred, which were not recognized during proofreading process.     April Cao MD  [phone]

## 2021-11-16 NOTE — LETTER
[facility]  [address] [facility] [place] [address]  [phone]  Dept: [phone]  2021     Nina Isaacs DO  [address] [place] [address]    Patient: Tamiko Tiwari   YOB: 1975   Date of Visit: 2021       To:  Nina Isaacs DO    Our mutual patient, Tamiko Tiwari, was seen in my office on 2021.  Thank you so much for the referral!  I have her set up for surgery on .  I will let you know what I find in the operating room.  Hope you have a happy Thanksgiving!     Mary Ellen    Below are my notes.     April Cao MD  2021  5:02 PM  Signed  [facility]  Gynecologic Oncology  New Patient History & Physical    Referring Provider: Nina Isaacs DO  [address] [place] [address]    Chief Complaint: Complex right ovarian cyst    History of Present Illness:   Tamiko Tiwari is a 46 y.o.  here today for evaluation of right adnexal mass.  This was originally seen on CT scan 2021 and has been followed on ultrasound.  The mass has been relatively stable in size, it is increased from 3.6 cm to about 4 cm and there is nodularity within it that seems to be increasing since the last ultrasound in July.  Dr. Isaacs and I reviewed the images.  Her CA-125 has been completely normal but because of the appearance, a frozen section will be warranted intraoperatively and so the procedure really should be done here in [place].  Because of OR availability, I was able to get Ms. Tiwari into the operating room sooner here so she is referred for me to evaluate and for us to make a plan to go to the operating room.    She has been having some significant heavy menstrual bleeding and irregular cycles likely due to perimenopausal bleeding.  She has had a trial of oral contraceptive pills without any improvement.  She previously had an endometrial biopsy that was reportedly normal.  And has been on birth  control pills.    Review of Systems   Review of Systems   HENT:   Positive for hearing loss.    Respiratory: Negative for chest tightness and shortness of breath.    Gastrointestinal: Negative for abdominal distention, abdominal pain, constipation, diarrhea, nausea and vomiting.   Genitourinary: Positive for vaginal bleeding. Negative for difficulty urinating and dysuria.    All other systems reviewed and are negative.     Past Medical History:  Past Medical History:   Diagnosis Date   • Allergic Pollen -    • Depression    • Endocrine disorder    • Hypertension    • Hypothyroid    • Iron deficiency 2020   • Psychiatric illness     depression   • Severe persistent asthma with acute exacerbation 2020    Eosinophilic asthma; [place]       Current Medications:    Current Outpatient Medications:   •  montelukast (SINGULAIR) 10 mg tablet, Take 1 tablet (10 mg total) by mouth nightly, Disp: 30 tablet, Rfl: 11  •  fluticasone propion-salmeteroL (Wixela Inhub) 250-50 mcg/dose diskus inhaler, INHALE 1 PUFF TWICE DAILY RINSE MOUTH TO REDUCE AFTERTASTE DO NOT SWALLOW, Disp: , Rfl:   •  spironolactone (ALDACTONE) 25 mg tablet, Take 1 tablet (25 mg total) by mouth daily, Disp: 30 tablet, Rfl: 0  •  levothyroxine (SYNTHROID, LEVOTHROID) 137 mcg tablet, TAKE 1 TABLET BY MOUTH DAILY, Disp: 90 tablet, Rfl: 0  •  sertraline (ZOLOFT) 100 mg tablet, TAKE 1 TABLET(100 MG) BY MOUTH DAILY, Disp: 90 tablet, Rfl: 1  •  albuterol HFA (PROVENTIL HFA;VENTOLIN HFA) 90 mcg/actuation inhaler, Inhale 2 puffs every 2 (two) hours as needed for wheezing (every 2-3 hours as needed for cough/wheezing), Disp: 1 Inhaler, Rfl: 1  •  drospirenone, contraceptive, (Slynd) 4 mg (28) tablet, Take 4 mg by mouth daily (Patient not taking: Reported on 2021 ), Disp: 30 tablet, Rfl: 11     Allergies:  The patient is allergic to pollen extracts.    Past Surgical History:  Past Surgical History:   Procedure Laterality Date   •  SECTION    "      Family History:  Cancer-related family history includes Cancer in her mother's sister; Prostate cancer in her father. There is no history of Ovarian cancer, Pancreatic cancer, Melanoma, Breast cancer, or Uterine cancer.    Social History:  Social History     Tobacco Use   • Smoking status: Never Smoker   • Smokeless tobacco: Never Used   Vaping Use   • Vaping Use: Never used   Substance Use Topics   • Alcohol use: Yes     Alcohol/week: 0.0 standard drinks     Comment: occ   • Drug use: No       OB/GYN History: 1 SAB, 1 C/S, no h/o abnormal pap smears    Physical Exam:  Vitals:    21 1446   BP: 173/74   Temp: 37.1 °C (98.7 °F)   Pulse: 88   Resp: 16   SpO2: 97%   Height: 1.676 m (5' 6\")   Weight: 82.1 kg (181 lb)   PainSc: 0-No pain     Weights (last 180 days)     Date/Time Weight    21 1446 82.1 kg (181 lb)        ECOG Performance Status: 0    Physical Exam  Constitutional:       Appearance: Normal appearance.   HENT:      Head: Normocephalic.   Cardiovascular:      Rate and Rhythm: Normal rate and regular rhythm.      Pulses: Normal pulses.      Heart sounds: Normal heart sounds.   Pulmonary:      Effort: Pulmonary effort is normal.      Breath sounds: Normal breath sounds. No wheezing, rhonchi or rales.   Abdominal:      General: Abdomen is flat. There is no distension.      Palpations: Abdomen is soft. There is no mass.      Tenderness: There is no abdominal tenderness. There is no guarding or rebound.      Hernia: No hernia is present.      Comments: Transverse incision is well-healed from prior  section   Genitourinary:     General: Normal vulva.      Comments: The cervix is grossly normal in appearance and there is brown blood in the vaginal vault.  The uterus feels adhesed to the anterior abdominal wall on exam.  There are no palpable adnexal masses  Musculoskeletal:      Cervical back: Normal range of motion.   Skin:     General: Skin is warm and dry.   Neurological:      General: " No focal deficit present.      Mental Status: She is alert and oriented to person, place, and time.   Psychiatric:         Mood and Affect: Mood normal.         Behavior: Behavior normal.         Thought Content: Thought content normal.         Judgment: Judgment normal.        Labs:  Lab Results   Component Value Date    WBC 7.3 10/27/2021    NEUTROABS 3.70 10/27/2021    HGB 12.6 10/27/2021     (H) 10/27/2021    K 3.8 10/27/2021    CREATININE 1.07 10/27/2021    BILITOT 0.35 2021    ALKPHOS 73 2021    AST 18 2021    ALT 12 2021    CALCIUM 8.6 10/27/2021     Lab Results   Component Value Date     32.6 2021       Imaging Reports:  TVUS 2021:  IMPRESSION:   Slight interval enlargement of the mildly complex right ovarian cyst with appearance of increased mural nodularity. Neoplasm must be considered.    Assessment:  Tamiko Tiawri is a 46 y.o. with a complex right adnexal mass and heavy and abnormal menstrual bleeding here today to discuss treatment options. After discussion, we have developed the following plan with shared decision making.    Plan:  Menometrorrhagia  · She has had an adequate trial of oral contraceptives without improvement  · She desires definitive management and does not have any desire for future childbearing.  For this reason I think it is very reasonable to perform a hysterectomy as it will definitively manage her heavy bleeding  · We reviewed the risks and benefits of proceeding with a hysterectomy including particularly risk of injury to bladder or ureters with her prior  section    Adnexal mass  · We discussed the range of possibilities including benign, borderline, and malignant masses.  Because of the nodularity seen, the mass is more concerning for a borderline rather than this being a truly benign mass.  Malignancy also remains in the differential  · We discussed that a borderline tumor is what it sounds like and that it is not a  fulminant cancer but it is not completely benign.  We discussed that there is risk of recurrence particularly if the other ovary is left in place.  In her situation, I would consider leaving the left ovary if it appeared completely normal even with a borderline tumor in the right ovary as the risk of recurrence is anywhere between 10 and 30%.  Additionally a normal CA-125 is possible in an early stage cancer or in a borderline tumor and so that being normal does not completely rule out the possibility of a cancer.  · Based on the currently available information, we discussed that the best option would be to proceed with a hysterectomy for management of her likely perimenopausal bleeding, as well as a right salpingo-oophorectomy and left salpingectomy.  I would plan to send the uterus and right ovary for frozen section.  We discussed that if a borderline tumor was identified in the left ovary was normal, I would leave the left ovary behind and take an omental biopsy.  If there was evidence of a cancer, I would perform a staging operation to include an omentectomy and removal of the bilateral pelvic nodes and periaortic lymph nodes.  · If on diagnostic laparoscopy there is evidence of extensive malignancy, then a laparotomy may be required.  We did discuss that this could be staged laparoscopically, and I would only open in the event that it was necessary to perform an optimal surgery.  · She is scheduled to have sinus surgery tomorrow and I spoke with Dr. Carmen who will be performing her surgery.  He recommended no wait time following the sinus surgery and is okay with us proceeding following Thanksgiving on November 29 which is Ms. Tiwari's preferred date.  · Have posted this in the main operating room and communicated this with her and her .    All questions answered to patient's apparent satisfaction.    On this date of service 60 minutes of total time was spent on this encounter.     I would like to thank  Dr Isaacs  for this consultation.     This note was generated using voice recognition software. Inadvertent word errors may have occurred, which were not recognized during proofreading process.     April Cao MD  [phone]                 If you have questions, please do not hesitate to call me. I look forward to following your patient along with you.         Sincerely,        April Cao MD        CC: No Recipients

## 2021-11-16 NOTE — PROGRESS NOTES
Formerly [facility], [facility]–1983  Gynecologic Oncology  New Patient History & Physical    Referring Provider: Nina Isaacs, DO  [address] [facility] [place] [address]    Chief Complaint: Complex right ovarian cyst    History of Present Illness:   Tamiko Tiwari is a 46 y.o.  here today for evaluation of right adnexal mass.  This was originally seen on CT scan 2021 and has been followed on ultrasound.  The mass has been relatively stable in size, it is increased from 3.6 cm to about 4 cm and there is nodularity within it that seems to be increasing since the last ultrasound in July.  Dr. Isaacs and I reviewed the images.  Her CA-125 has been completely normal but because of the appearance, a frozen section will be warranted intraoperatively and so the procedure really should be done here in [place].  Because of OR availability, I was able to get Ms. Tiwari into the operating room sooner here so she is referred for me to evaluate and for us to make a plan to go to the operating room.    She has been having some significant heavy menstrual bleeding and irregular cycles likely due to perimenopausal bleeding.  She has had a trial of oral contraceptive pills without any improvement.  She previously had an endometrial biopsy that was reportedly normal.  And has been on birth control pills.    Review of Systems   Review of Systems   HENT:   Positive for hearing loss.    Respiratory: Negative for chest tightness and shortness of breath.    Gastrointestinal: Negative for abdominal distention, abdominal pain, constipation, diarrhea, nausea and vomiting.   Genitourinary: Positive for vaginal bleeding. Negative for difficulty urinating and dysuria.    All other systems reviewed and are negative.     Past Medical History:  Past Medical History:   Diagnosis Date   • Allergic Pollen - 2019   • Depression    • Endocrine disorder    • Hypertension    • Hypothyroid    • Iron deficiency 2020   • Psychiatric illness     depression    • Severe persistent asthma with acute exacerbation 2020    Eosinophilic asthma; [place]       Current Medications:    Current Outpatient Medications:   •  montelukast (SINGULAIR) 10 mg tablet, Take 1 tablet (10 mg total) by mouth nightly, Disp: 30 tablet, Rfl: 11  •  fluticasone propion-salmeteroL (Wixela Inhub) 250-50 mcg/dose diskus inhaler, INHALE 1 PUFF TWICE DAILY RINSE MOUTH TO REDUCE AFTERTASTE DO NOT SWALLOW, Disp: , Rfl:   •  spironolactone (ALDACTONE) 25 mg tablet, Take 1 tablet (25 mg total) by mouth daily, Disp: 30 tablet, Rfl: 0  •  levothyroxine (SYNTHROID, LEVOTHROID) 137 mcg tablet, TAKE 1 TABLET BY MOUTH DAILY, Disp: 90 tablet, Rfl: 0  •  sertraline (ZOLOFT) 100 mg tablet, TAKE 1 TABLET(100 MG) BY MOUTH DAILY, Disp: 90 tablet, Rfl: 1  •  albuterol HFA (PROVENTIL HFA;VENTOLIN HFA) 90 mcg/actuation inhaler, Inhale 2 puffs every 2 (two) hours as needed for wheezing (every 2-3 hours as needed for cough/wheezing), Disp: 1 Inhaler, Rfl: 1  •  drospirenone, contraceptive, (Slynd) 4 mg (28) tablet, Take 4 mg by mouth daily (Patient not taking: Reported on 2021 ), Disp: 30 tablet, Rfl: 11     Allergies:  The patient is allergic to pollen extracts.    Past Surgical History:  Past Surgical History:   Procedure Laterality Date   •  SECTION         Family History:  Cancer-related family history includes Cancer in her mother's sister; Prostate cancer in her father. There is no history of Ovarian cancer, Pancreatic cancer, Melanoma, Breast cancer, or Uterine cancer.    Social History:  Social History     Tobacco Use   • Smoking status: Never Smoker   • Smokeless tobacco: Never Used   Vaping Use   • Vaping Use: Never used   Substance Use Topics   • Alcohol use: Yes     Alcohol/week: 0.0 standard drinks     Comment: occ   • Drug use: No       OB/GYN History: 1 SAB, 1 C/S, no h/o abnormal pap smears    Physical Exam:  Vitals:    21 1446   BP: 173/74   Temp: 37.1 °C (98.7 °F)   Pulse: 88  "  Resp: 16   SpO2: 97%   Height: 1.676 m (5' 6\")   Weight: 82.1 kg (181 lb)   PainSc: 0-No pain     Weights (last 180 days)     Date/Time Weight    21 1446 82.1 kg (181 lb)        ECOG Performance Status: 0    Physical Exam  Constitutional:       Appearance: Normal appearance.   HENT:      Head: Normocephalic.   Cardiovascular:      Rate and Rhythm: Normal rate and regular rhythm.      Pulses: Normal pulses.      Heart sounds: Normal heart sounds.   Pulmonary:      Effort: Pulmonary effort is normal.      Breath sounds: Normal breath sounds. No wheezing, rhonchi or rales.   Abdominal:      General: Abdomen is flat. There is no distension.      Palpations: Abdomen is soft. There is no mass.      Tenderness: There is no abdominal tenderness. There is no guarding or rebound.      Hernia: No hernia is present.      Comments: Transverse incision is well-healed from prior  section   Genitourinary:     General: Normal vulva.      Comments: The cervix is grossly normal in appearance and there is brown blood in the vaginal vault.  The uterus feels adhesed to the anterior abdominal wall on exam.  There are no palpable adnexal masses  Musculoskeletal:      Cervical back: Normal range of motion.   Skin:     General: Skin is warm and dry.   Neurological:      General: No focal deficit present.      Mental Status: She is alert and oriented to person, place, and time.   Psychiatric:         Mood and Affect: Mood normal.         Behavior: Behavior normal.         Thought Content: Thought content normal.         Judgment: Judgment normal.        Labs:  Lab Results   Component Value Date    WBC 7.3 10/27/2021    NEUTROABS 3.70 10/27/2021    HGB 12.6 10/27/2021     (H) 10/27/2021    K 3.8 10/27/2021    CREATININE 1.07 10/27/2021    BILITOT 0.35 2021    ALKPHOS 73 2021    AST 18 2021    ALT 12 2021    CALCIUM 8.6 10/27/2021     Lab Results   Component Value Date     32.6 2021 "       Imaging Reports:  TVUS 2021:  IMPRESSION:   Slight interval enlargement of the mildly complex right ovarian cyst with appearance of increased mural nodularity. Neoplasm must be considered.    Assessment:  Tamiko Tiwari is a 46 y.o. with a complex right adnexal mass and heavy and abnormal menstrual bleeding here today to discuss treatment options. After discussion, we have developed the following plan with shared decision making.    Plan:  Menometrorrhagia  · She has had an adequate trial of oral contraceptives without improvement  · She desires definitive management and does not have any desire for future childbearing.  For this reason I think it is very reasonable to perform a hysterectomy as it will definitively manage her heavy bleeding  · We reviewed the risks and benefits of proceeding with a hysterectomy including particularly risk of injury to bladder or ureters with her prior  section    Adnexal mass  · We discussed the range of possibilities including benign, borderline, and malignant masses.  Because of the nodularity seen, the mass is more concerning for a borderline rather than this being a truly benign mass.  Malignancy also remains in the differential  · We discussed that a borderline tumor is what it sounds like and that it is not a fulminant cancer but it is not completely benign.  We discussed that there is risk of recurrence particularly if the other ovary is left in place.  In her situation, I would consider leaving the left ovary if it appeared completely normal even with a borderline tumor in the right ovary as the risk of recurrence is anywhere between 10 and 30%.  Additionally a normal CA-125 is possible in an early stage cancer or in a borderline tumor and so that being normal does not completely rule out the possibility of a cancer.  · Based on the currently available information, we discussed that the best option would be to proceed with a hysterectomy for management  of her likely perimenopausal bleeding, as well as a right salpingo-oophorectomy and left salpingectomy.  I would plan to send the uterus and right ovary for frozen section.  We discussed that if a borderline tumor was identified in the left ovary was normal, I would leave the left ovary behind and take an omental biopsy.  If there was evidence of a cancer, I would perform a staging operation to include an omentectomy and removal of the bilateral pelvic nodes and periaortic lymph nodes.  · If on diagnostic laparoscopy there is evidence of extensive malignancy, then a laparotomy may be required.  We did discuss that this could be staged laparoscopically, and I would only open in the event that it was necessary to perform an optimal surgery.  · She is scheduled to have sinus surgery tomorrow and I spoke with Dr. Carmen who will be performing her surgery.  He recommended no wait time following the sinus surgery and is okay with us proceeding following Thanksgiving on November 29 which is Ms. Tiwari's preferred date.  · Have posted this in the main operating room and communicated this with her and her .    All questions answered to patient's apparent satisfaction.    On this date of service 60 minutes of total time was spent on this encounter.     I would like to thank Dr Isaacs  for this consultation.     This note was generated using voice recognition software. Inadvertent word errors may have occurred, which were not recognized during proofreading process.     April Cao MD  [phone]

## 2021-11-17 ENCOUNTER — ANESTHESIA (OUTPATIENT)
Dept: GASTROENTEROLOGY | Facility: HOSPITAL | Age: 46
End: 2021-11-17
Payer: COMMERCIAL

## 2021-11-17 ENCOUNTER — HOSPITAL ENCOUNTER (OUTPATIENT)
Facility: HOSPITAL | Age: 46
Setting detail: OUTPATIENT SURGERY
Discharge: HOME | End: 2021-11-17
Attending: OTOLARYNGOLOGY | Admitting: OTOLARYNGOLOGY
Payer: COMMERCIAL

## 2021-11-17 VITALS
HEIGHT: 66 IN | SYSTOLIC BLOOD PRESSURE: 155 MMHG | DIASTOLIC BLOOD PRESSURE: 94 MMHG | HEART RATE: 96 BPM | WEIGHT: 176 LBS | RESPIRATION RATE: 16 BRPM | TEMPERATURE: 97.8 F | BODY MASS INDEX: 28.28 KG/M2 | OXYGEN SATURATION: 95 %

## 2021-11-17 DIAGNOSIS — Z01.818 PREOP TESTING: ICD-10-CM

## 2021-11-17 DIAGNOSIS — J32.4 CHRONIC PANSINUSITIS: Primary | ICD-10-CM

## 2021-11-17 DIAGNOSIS — N83.299 COMPLEX OVARIAN CYST: Primary | ICD-10-CM

## 2021-11-17 DIAGNOSIS — J34.3 HYPERTROPHY OF NASAL TURBINATES: ICD-10-CM

## 2021-11-17 LAB
HCG, URINE (AMB): NEGATIVE
INTERNAL QC PASS?: YES

## 2021-11-17 PROCEDURE — (BLANK) HC RECOVERY PHASE-1 1ST  HOUR ACUITY LEVEL 3: Performed by: OTOLARYNGOLOGY

## 2021-11-17 PROCEDURE — C2625 STENT, NON-COR, TEM W/DEL SY: HCPCS | Performed by: OTOLARYNGOLOGY

## 2021-11-17 PROCEDURE — 6360000200 HC RX 636 W HCPCS (ALT 250 FOR IP): Performed by: NURSE ANESTHETIST, CERTIFIED REGISTERED

## 2021-11-17 PROCEDURE — 31257 NSL/SINS NDSC TOT W/SPHENDT: CPT | Mod: 50,51 | Performed by: OTOLARYNGOLOGY

## 2021-11-17 PROCEDURE — 6370000100 HC RX 637 (ALT 250 FOR IP): Performed by: NURSE PRACTITIONER

## 2021-11-17 PROCEDURE — 00160 ANES PX NOSE&SINUS NOS: CPT | Performed by: NURSE ANESTHETIST, CERTIFIED REGISTERED

## 2021-11-17 PROCEDURE — 30140 RESECT INFERIOR TURBINATE: CPT | Mod: 50,AS,51,NC | Performed by: NURSE PRACTITIONER

## 2021-11-17 PROCEDURE — 31253 NSL/SINS NDSC TOTAL: CPT | Mod: 50,AS,NC | Performed by: NURSE PRACTITIONER

## 2021-11-17 PROCEDURE — 2580000300 HC RX 258: Performed by: NURSE PRACTITIONER

## 2021-11-17 PROCEDURE — 30140 RESECT INFERIOR TURBINATE: CPT | Mod: 50,51 | Performed by: OTOLARYNGOLOGY

## 2021-11-17 PROCEDURE — 31257 NSL/SINS NDSC TOT W/SPHENDT: CPT | Mod: 50,AS,51,NC | Performed by: NURSE PRACTITIONER

## 2021-11-17 PROCEDURE — 2500000200 HC RX 250 WO HCPCS: Performed by: NURSE ANESTHETIST, CERTIFIED REGISTERED

## 2021-11-17 PROCEDURE — 2580000300 HC RX 258: Performed by: NURSE ANESTHETIST, CERTIFIED REGISTERED

## 2021-11-17 PROCEDURE — (BLANK) HC OR LEVEL 2 PROC EACH ADDITIONAL MIN: Performed by: OTOLARYNGOLOGY

## 2021-11-17 PROCEDURE — 6360000200 HC RX 636 W HCPCS (ALT 250 FOR IP): Performed by: NURSE PRACTITIONER

## 2021-11-17 PROCEDURE — (BLANK) HC RECOVERY PHASE-2 1ST 1/2 HOUR ACUITY LEVEL 1: Performed by: OTOLARYNGOLOGY

## 2021-11-17 PROCEDURE — (BLANK) HC OR LEVEL 2 PROC 1ST 15MIN: Performed by: OTOLARYNGOLOGY

## 2021-11-17 PROCEDURE — 31267 ENDOSCOPY MAXILLARY SINUS: CPT | Mod: 50,AS,51,NC | Performed by: NURSE PRACTITIONER

## 2021-11-17 PROCEDURE — (BLANK) HC RECOVERY PHASE-2 EACH ADDITIONAL 1/2 HOUR ACUITY LEVEL 1: Performed by: OTOLARYNGOLOGY

## 2021-11-17 PROCEDURE — 31267 ENDOSCOPY MAXILLARY SINUS: CPT | Mod: 50 | Performed by: OTOLARYNGOLOGY

## 2021-11-17 PROCEDURE — 81025 URINE PREGNANCY TEST: CPT | Performed by: NURSE ANESTHETIST, CERTIFIED REGISTERED

## 2021-11-17 PROCEDURE — 31253 NSL/SINS NDSC TOTAL: CPT | Mod: 50 | Performed by: OTOLARYNGOLOGY

## 2021-11-17 PROCEDURE — (BLANK) HC GENERAL ANESTHESIA FACILITY CHARGE 1ST 15 MIN: Performed by: OTOLARYNGOLOGY

## 2021-11-17 PROCEDURE — (BLANK) HC GENERAL ANESTHESIA FACILITY CHARGE EACH ADDITIONAL MIN: Performed by: OTOLARYNGOLOGY

## 2021-11-17 DEVICE — STENT PROPEL CONTOUR IFU: Type: IMPLANTABLE DEVICE | Site: NOSE | Status: FUNCTIONAL

## 2021-11-17 RX ORDER — FENTANYL CITRATE/PF 50 MCG/ML
PLASTIC BAG, INJECTION (ML) INTRAVENOUS AS NEEDED
Status: DISCONTINUED | OUTPATIENT
Start: 2021-11-17 | End: 2021-11-17 | Stop reason: SURG

## 2021-11-17 RX ORDER — MIDAZOLAM HYDROCHLORIDE 1 MG/ML
1 INJECTION INTRAMUSCULAR; INTRAVENOUS EVERY 5 MIN PRN
Status: DISCONTINUED | OUTPATIENT
Start: 2021-11-17 | End: 2021-11-17 | Stop reason: HOSPADM

## 2021-11-17 RX ORDER — AMOXICILLIN AND CLAVULANATE POTASSIUM 875; 125 MG/1; MG/1
1 TABLET, FILM COATED ORAL 2 TIMES DAILY
Qty: 20 TABLET | Refills: 0 | Status: SHIPPED | OUTPATIENT
Start: 2021-11-17 | End: 2021-11-27

## 2021-11-17 RX ORDER — SODIUM CHLORIDE, SODIUM LACTATE, POTASSIUM CHLORIDE, AND CALCIUM CHLORIDE .6; .31; .03; .02 G/100ML; G/100ML; G/100ML; G/100ML
250 INJECTION, SOLUTION INTRAVENOUS ONCE AS NEEDED
Status: DISCONTINUED | OUTPATIENT
Start: 2021-11-17 | End: 2021-11-17 | Stop reason: HOSPADM

## 2021-11-17 RX ORDER — ACETAMINOPHEN 500 MG
1000 TABLET ORAL ONCE
Status: DISCONTINUED | OUTPATIENT
Start: 2021-11-17 | End: 2021-11-17 | Stop reason: HOSPADM

## 2021-11-17 RX ORDER — METOPROLOL TARTRATE 1 MG/ML
1 INJECTION, SOLUTION INTRAVENOUS EVERY 5 MIN PRN
Status: DISCONTINUED | OUTPATIENT
Start: 2021-11-17 | End: 2021-11-17 | Stop reason: HOSPADM

## 2021-11-17 RX ORDER — HYDROMORPHONE HYDROCHLORIDE 1 MG/ML
0.5 INJECTION, SOLUTION INTRAMUSCULAR; INTRAVENOUS; SUBCUTANEOUS EVERY 5 MIN PRN
Status: DISCONTINUED | OUTPATIENT
Start: 2021-11-17 | End: 2021-11-17 | Stop reason: HOSPADM

## 2021-11-17 RX ORDER — SODIUM CHLORIDE, SODIUM LACTATE, POTASSIUM CHLORIDE, AND CALCIUM CHLORIDE .6; .31; .03; .02 G/100ML; G/100ML; G/100ML; G/100ML
500 INJECTION, SOLUTION INTRAVENOUS ONCE AS NEEDED
Status: DISCONTINUED | OUTPATIENT
Start: 2021-11-17 | End: 2021-11-17 | Stop reason: HOSPADM

## 2021-11-17 RX ORDER — SODIUM CHLORIDE, SODIUM LACTATE, POTASSIUM CHLORIDE, CALCIUM CHLORIDE 600; 310; 30; 20 MG/100ML; MG/100ML; MG/100ML; MG/100ML
100 INJECTION, SOLUTION INTRAVENOUS CONTINUOUS
Status: DISCONTINUED | OUTPATIENT
Start: 2021-11-17 | End: 2021-11-17 | Stop reason: HOSPADM

## 2021-11-17 RX ORDER — ONDANSETRON HYDROCHLORIDE 2 MG/ML
4 INJECTION, SOLUTION INTRAVENOUS ONCE AS NEEDED
Status: DISCONTINUED | OUTPATIENT
Start: 2021-11-17 | End: 2021-11-17 | Stop reason: HOSPADM

## 2021-11-17 RX ORDER — LABETALOL HYDROCHLORIDE 5 MG/ML
10 INJECTION, SOLUTION INTRAVENOUS EVERY 10 MIN PRN
Status: DISCONTINUED | OUTPATIENT
Start: 2021-11-17 | End: 2021-11-17 | Stop reason: HOSPADM

## 2021-11-17 RX ORDER — DEXMEDETOMIDINE HYDROCHLORIDE 4 UG/ML
INJECTION, SOLUTION INTRAVENOUS AS NEEDED
Status: DISCONTINUED | OUTPATIENT
Start: 2021-11-17 | End: 2021-11-17 | Stop reason: SURG

## 2021-11-17 RX ORDER — LIDOCAINE HYDROCHLORIDE 20 MG/ML
INJECTION, SOLUTION EPIDURAL; INFILTRATION; INTRACAUDAL; PERINEURAL CONTINUOUS PRN
Status: DISCONTINUED | OUTPATIENT
Start: 2021-11-17 | End: 2021-11-17 | Stop reason: SURG

## 2021-11-17 RX ORDER — LIDOCAINE HYDROCHLORIDE AND EPINEPHRINE 10; 10 UG/ML; MG/ML
INJECTION, SOLUTION INFILTRATION; PERINEURAL AS NEEDED
Status: DISCONTINUED | OUTPATIENT
Start: 2021-11-17 | End: 2021-11-17 | Stop reason: HOSPADM

## 2021-11-17 RX ORDER — METOCLOPRAMIDE HYDROCHLORIDE 5 MG/ML
10 INJECTION INTRAMUSCULAR; INTRAVENOUS ONCE AS NEEDED
Status: DISCONTINUED | OUTPATIENT
Start: 2021-11-17 | End: 2021-11-17 | Stop reason: HOSPADM

## 2021-11-17 RX ORDER — DEXAMETHASONE SODIUM PHOSPHATE 4 MG/ML
12 INJECTION, SOLUTION INTRA-ARTICULAR; INTRALESIONAL; INTRAMUSCULAR; INTRAVENOUS; SOFT TISSUE ONCE
Status: COMPLETED | OUTPATIENT
Start: 2021-11-17 | End: 2021-11-17

## 2021-11-17 RX ORDER — HYDROCODONE BITARTRATE AND ACETAMINOPHEN 5; 325 MG/1; MG/1
1-2 TABLET ORAL EVERY 6 HOURS PRN
Qty: 20 TABLET | Refills: 0 | Status: ON HOLD | OUTPATIENT
Start: 2021-11-17 | End: 2021-11-29 | Stop reason: ALTCHOICE

## 2021-11-17 RX ORDER — ROCURONIUM BROMIDE 50 MG/5 ML
SYRINGE (ML) INTRAVENOUS AS NEEDED
Status: DISCONTINUED | OUTPATIENT
Start: 2021-11-17 | End: 2021-11-17 | Stop reason: SURG

## 2021-11-17 RX ORDER — NALOXONE HYDROCHLORIDE 0.4 MG/ML
0.2 INJECTION, SOLUTION INTRAMUSCULAR; INTRAVENOUS; SUBCUTANEOUS AS NEEDED
Status: DISCONTINUED | OUTPATIENT
Start: 2021-11-17 | End: 2021-11-17 | Stop reason: HOSPADM

## 2021-11-17 RX ORDER — OXYMETAZOLINE HCL 0.05 %
2 SPRAY, NON-AEROSOL (ML) NASAL
Status: COMPLETED | OUTPATIENT
Start: 2021-11-17 | End: 2021-11-17

## 2021-11-17 RX ORDER — MAGNESIUM SULFATE HEPTAHYDRATE 40 MG/ML
INJECTION, SOLUTION INTRAVENOUS AS NEEDED
Status: DISCONTINUED | OUTPATIENT
Start: 2021-11-17 | End: 2021-11-17 | Stop reason: SURG

## 2021-11-17 RX ORDER — ONDANSETRON HYDROCHLORIDE 2 MG/ML
INJECTION, SOLUTION INTRAVENOUS AS NEEDED
Status: DISCONTINUED | OUTPATIENT
Start: 2021-11-17 | End: 2021-11-17 | Stop reason: SURG

## 2021-11-17 RX ORDER — FENTANYL CITRATE/PF 50 MCG/ML
50 PLASTIC BAG, INJECTION (ML) INTRAVENOUS EVERY 5 MIN PRN
Status: DISCONTINUED | OUTPATIENT
Start: 2021-11-17 | End: 2021-11-17 | Stop reason: HOSPADM

## 2021-11-17 RX ORDER — PROPOFOL 10 MG/ML
INJECTION, EMULSION INTRAVENOUS AS NEEDED
Status: DISCONTINUED | OUTPATIENT
Start: 2021-11-17 | End: 2021-11-17 | Stop reason: SURG

## 2021-11-17 RX ADMIN — OXYMETAZOLINE HYDROCHLORIDE 2 SPRAY: 0.05 SPRAY NASAL at 08:21

## 2021-11-17 RX ADMIN — Medication 10 MG: at 10:08

## 2021-11-17 RX ADMIN — PROPOFOL 200 MG: 10 INJECTION, EMULSION INTRAVENOUS at 10:08

## 2021-11-17 RX ADMIN — LIDOCAINE HYDROCHLORIDE 1 MG/KG/HR: 20 INJECTION, SOLUTION EPIDURAL; INFILTRATION; INTRACAUDAL; PERINEURAL at 10:15

## 2021-11-17 RX ADMIN — ONDANSETRON 4 MG: 2 INJECTION INTRAMUSCULAR; INTRAVENOUS at 12:31

## 2021-11-17 RX ADMIN — METOPROLOL TARTRATE 1 MG: 5 INJECTION INTRAVENOUS at 13:16

## 2021-11-17 RX ADMIN — FENTANYL CITRATE 50 MCG: 50 INJECTION, SOLUTION INTRAMUSCULAR; INTRAVENOUS at 11:53

## 2021-11-17 RX ADMIN — FENTANYL CITRATE 50 MCG: 50 INJECTION, SOLUTION INTRAMUSCULAR; INTRAVENOUS at 10:48

## 2021-11-17 RX ADMIN — LABETALOL HYDROCHLORIDE 10 MG: 5 INJECTION, SOLUTION INTRAVENOUS at 13:33

## 2021-11-17 RX ADMIN — FENTANYL CITRATE 50 MCG: 50 INJECTION, SOLUTION INTRAMUSCULAR; INTRAVENOUS at 10:08

## 2021-11-17 RX ADMIN — MAGNESIUM SULFATE HEPTAHYDRATE 2 G: 40 INJECTION, SOLUTION INTRAVENOUS at 10:17

## 2021-11-17 RX ADMIN — Medication 10 MG: at 11:31

## 2021-11-17 RX ADMIN — SODIUM CHLORIDE, POTASSIUM CHLORIDE, SODIUM LACTATE AND CALCIUM CHLORIDE 100 ML/HR: 600; 310; 30; 20 INJECTION, SOLUTION INTRAVENOUS at 08:29

## 2021-11-17 RX ADMIN — OXYMETAZOLINE HYDROCHLORIDE 2 SPRAY: 0.05 SPRAY NASAL at 08:29

## 2021-11-17 RX ADMIN — OXYMETAZOLINE HYDROCHLORIDE 2 SPRAY: 0.05 SPRAY NASAL at 08:17

## 2021-11-17 RX ADMIN — OXYMETAZOLINE HYDROCHLORIDE 2 SPRAY: 0.05 SPRAY NASAL at 08:11

## 2021-11-17 RX ADMIN — ONDANSETRON 4 MG: 2 INJECTION INTRAMUSCULAR; INTRAVENOUS at 08:40

## 2021-11-17 RX ADMIN — DEXMEDETOMIDINE HYDROCHLORIDE 4 MCG: 4 INJECTION, SOLUTION INTRAVENOUS at 11:15

## 2021-11-17 RX ADMIN — FENTANYL CITRATE 50 MCG: 50 INJECTION, SOLUTION INTRAMUSCULAR; INTRAVENOUS at 11:03

## 2021-11-17 RX ADMIN — DEXAMETHASONE SODIUM PHOSPHATE 12 MG: 4 INJECTION, SOLUTION INTRAMUSCULAR; INTRAVENOUS at 10:28

## 2021-11-17 RX ADMIN — METOPROLOL TARTRATE 1 MG: 5 INJECTION INTRAVENOUS at 13:08

## 2021-11-17 RX ADMIN — DEXMEDETOMIDINE HYDROCHLORIDE 4 MCG: 4 INJECTION, SOLUTION INTRAVENOUS at 10:24

## 2021-11-17 RX ADMIN — DEXMEDETOMIDINE HYDROCHLORIDE 8 MCG: 4 INJECTION, SOLUTION INTRAVENOUS at 10:58

## 2021-11-17 RX ADMIN — DEXMEDETOMIDINE HYDROCHLORIDE 4 MCG: 4 INJECTION, SOLUTION INTRAVENOUS at 10:42

## 2021-11-17 RX ADMIN — SODIUM CHLORIDE 3000 MG: 900 INJECTION INTRAVENOUS at 10:14

## 2021-11-17 RX ADMIN — LABETALOL HYDROCHLORIDE 10 MG: 5 INJECTION, SOLUTION INTRAVENOUS at 13:23

## 2021-11-17 RX ADMIN — DEXMEDETOMIDINE HYDROCHLORIDE 4 MCG: 4 INJECTION, SOLUTION INTRAVENOUS at 11:38

## 2021-11-17 RX ADMIN — METOPROLOL TARTRATE 1 MG: 5 INJECTION INTRAVENOUS at 13:01

## 2021-11-17 RX ADMIN — DEXMEDETOMIDINE HYDROCHLORIDE 4 MCG: 4 INJECTION, SOLUTION INTRAVENOUS at 10:30

## 2021-11-17 ASSESSMENT — ENCOUNTER SYMPTOMS: EXERCISE TOLERANCE: GOOD (4-7 METS)

## 2021-11-17 NOTE — ANESTHESIA PROCEDURE NOTES
Airway  Urgency: elective    Airway not difficult    General Information and Staff    Patient location during procedure: OR  CRNA: Halle Malave CRNA  Performed: CRNA     Indications and Patient Condition  Indications for airway management: anesthesia  Spontaneous Ventilation: absent  Sedation level: deep  Preoxygenated: yes  Patient position: sniffing  MILS not maintained throughout  Mask difficulty assessment: 1 - vent by mask    Final Airway Details  Final airway type: endotracheal airway      Successful airway: ETT  Cuffed: yes   Successful intubation technique: direct laryngoscopy  Endotracheal tube insertion site: oral  Blade: Ita  Blade size: #3  ETT size (mm): 7.0  Cormack-Lehane Classification: grade I - full view of glottis  Placement verified by: chest auscultation and capnometry   Measured from: teeth  ETT to teeth (cm): 21  Number of attempts at approach: 1  Ventilation between attempts: none  Number of other approaches attempted: 0    Additional Comments  Smooth induction  Atraumatic intubation  No change to dentition

## 2021-11-17 NOTE — ANESTHESIA POSTPROCEDURE EVALUATION
Patient: Tamiko Tiwari    Procedure Summary     Date: 11/17/21 Room / Location: [facility] OR  / [facility]    Anesthesia Start: 1002 Anesthesia Stop:     Procedures:       FUNCTIONAL ENDOSCOPIC SINUS SURGERY WITH COMPUTER NAVIGATION - BILATERAL MAXILLAY and  ANTERIOR/POSTERIOR ETHMOID and SPHENOID  AND FRONTAL and BILATERAL INFERIOR TURBINATE REDUCTION (2 HR) (Bilateral )      BILATERAL INFERIOR TURBINATE REDUCTION (Bilateral Nose) Diagnosis:       Chronic pansinusitis      Hypertrophy of nasal turbinates      (Chronic pansinusitis [J32.4])      (Hypertrophy of nasal turbinates [J34.3])    Surgeons: Ally Carmen MD Responsible Provider: Halle Malave CRNA    Anesthesia Type: general ASA Status: 2          Anesthesia Type: general    Last vitals  Vitals Value Taken Time   /104 11/17/21 1246   Temp 36.6 °C (97.8 °F) 11/17/21 1246   Pulse 110 11/17/21 1246   Resp 26 11/17/21 1253   SpO2 99 % 11/17/21 1246   0-10 Pain Score     Vitals shown include unvalidated device data.    Anesthesia Post Evaluation    Patient location during evaluation: PACU  Patient participation: complete - patient participated  Level of consciousness: sleepy but conscious  Pain management: satisfactory to patient  Airway patency: patent  Anesthetic complications: no  Cardiovascular status: acceptable, hemodynamically stable and stable  Respiratory status: acceptable, nasal cannula, spontaneous ventilation and nonlabored ventilation (3L)  Hydration status: stable  May dismiss recovered patient based on consultation with the appropriate physicians and/or meeting appropriate discharge criteria      Cosmetic?  This procedure is not cosmetic.

## 2021-11-17 NOTE — DISCHARGE INSTRUCTIONS
Endoscopic Sinus Surgery Discharge Instructions     • Gradually resume a normal diet the day of surgery.     • Starting the evening of surgery cleanse your nostrils with nasal saline at least twice daily. The NeilMed® Sinus Rinse™ Kit is the recommended irrigation method and is available at drug stores and grocery stores.     • Gentle nose blowing and sniffing is okay. However, aggressive nose blowing may increase bleeding.     • Keep the nose moist by using over the counter saline nasal spray. 3 or 4 sprays in each nostril every couple hours is helpful.     • The nose will bleed on and off over the first week after surgery. This is normal. It is often helpful to wear a drip pad under your nose consisting of 4x4 gauze rolled up and taped in place. This will keep blood from dripping on your furniture or clothes.     • If the bleeding is more brisk at any given time you may use 2 sprays of Afrin® Nasal Spray in each nostril and this should help slow the bleeding.     • If the bleeding totally saturates an entire drip pad in 10 minutes and this continues nonstop for 2 hours you should call Dr. Carmen and proceed to the Emergency Department.     • Sleeping with your head elevated is helpful.     • If blood is dripping out the back of your nose into your throat please spit it out rather than swallowing it which can lead to nausea.     • Take your prescribed pain medicine as needed and your antibiotic as scheduled.   Norco (Hydrocodone 5mg/Acetaminophen 325mg) Take 1-2 tabs every 6 hours as needed for pain. If pain is well controlled use Acetaminophin 325 mg. Max dose of Acetaminophen in 24 hours is 3,000mg.    -Augmentin take 1 tab 2 times a day for 10 days. Please take probiotic with antibiotic.   Don’t hesitate to call with any questions or concerns.   Call the clinic during business hours at  or Dr. Carmen directly for problems or questions after business hours at [phone].    Return Appointment is in 2  weeks.    General Anesthesia, Adult, Care After  These instructions provide you with information on caring for yourself after your procedure. Your health care provider may also give you more specific instructions. Your treatment has been planned according to current medical practices, but problems sometimes occur. Call your health care provider if you have any problems or questions after your procedure.  `    WHAT TO EXPECT AFTER THE PROCEDURE    Due to surgery, you may feel tired or lightheaded and your judgment and motor abilities are going to be impaired for the next 24 hours. For your safety, please follow these instructions:  · DO NOT drive or operate heavy machinery for the next 24 hours.  · DO NOT use alcohol or sleeping pills for the next 24 hours.   · DO NOT make any critical decisions or sign any legal documents for the next 24 hours.  · A responsible adult needs to stay with you for the next 24 hours. Do not stay home alone.  · Limit your activity for the next 24 hours. You may return to your normal activities when your doctor gives you permission.      Anesthesia may cause nausea and vomiting in some individuals. To help prevent this, follow these tips:  · Start with water, clear liquids, and light foods, such as toast, crackers, or jello.  · If you are tolerating light foods without any problems, you may gradually increase diet as desired.  · If nausea or vomiting persists, call your doctor or come into the ER after clinic hours.    To help prevent blood clots, move your feet in a [place] or up and down 10-30 times an hour while sitting or lying down.    Call your doctor if you experience:  · A fever over a 100.4 F.   · If you notice pus or red streaks coming from your wound.  · If you notice increased redness or swelling around the wound.  · If you notice increasing bleeding from your wound.     Medications    You were given:    You may have more:    If you have any problems or questions you may  call:    [facility]:  [phone]  [facility]:  [phone]  [facility]:  [phone]  [facility]:  [phone]  [facility]:  [phone]

## 2021-11-17 NOTE — OP NOTE
Date of service: 17 November 2021.    Preop diagnosis: 1.  Chronic pansinusitis.  2.  Bilateral inferior turbinate hypertrophy.    Postop diagnosis: Same.    Surgical procedure performed: 1.  Endoscopic sinus surgery with computer image guidance to include bilateral maxillary sinusotomy with tissue removal, bilateral anterior and posterior ethmoidectomy, bilateral sphenoidotomy, bilateral frontal sinusotomy.  2.  Bilateral inferior turbinate reduction with submucous resection and outfracture.    Surgeon: Ally Carmen MD.    Assistant Surgeon: Pamella Mota CNP who assisted with instrument management throughout the case as well as with turbinate reduction and dressing placement.    Estimated blood loss: 60 mL.    Specimens: None.    Findings: Extensive mucosal edema and polypoid changes without keenan protruding polyps.    Indications: 46-year-old female with chronic sinusitis symptoms as well as pretty significant nasal obstruction.  CT scan after aggressive medical therapy revealed chronic pansinusitis as well as large inferior turbinates.  Comprehensive endoscopic sinus surgery plus bilateral inferior turbinate reduction was recommended and patient stated her desire to proceed.    Procedure in detail: The patient was taken to the operating room and given general endotracheal anesthesia.  Final timeout was performed.  The patient was connected to the image guidance surgery system which was registered and confirmed to be working correctly.  The patient was prepped and draped in standard fashion for nasal surgery.  A 0 degree telescope was used for visualization throughout the case.    I started by injecting both middle meatus areas especially the uncinate process bilaterally with lidocaine with epinephrine.  Both areas in the middle meatus were then packed with Afrin-soaked pledgets.  I started on the right with identification of the posterior margin of the uncinate process.  This was retracted anteriorly with  a ball-tipped seeker.  A combination of backbiter and microdebrider were used to remove the uncinate process and enter the maxillary sinus.  A widely open maxillary sinusotomy was created with these instruments.  Some inflamed tissue was removed from inside the maxillary sinus with the microdebrider.  Great care was taken to ensure that the surgical sinusotomy included the natural ostium.  This was assessed with the image guidance suction.  Next the anterior ethmoids were entered with the image guidance suction followed by removing anterior ethmoid tissue with microdebrider and Blakesley forceps.  The dissection was carried through the basal lamella into the posterior ethmoid sinuses, again using the image guidance suction to confirm anatomy and the microdebrider and Blakesley forceps to remove tissue.  The face of sphenoid was encountered.  This was confirmed again with the image guidance suction.  The image guidance suction was gently passed through the face of the sphenoid in the region of the natural ostium.  Once this was through the face of sphenoid and into the sinus itself the suction was taken out and the microdebrider and baby Blakesley forceps were used to widely open the sphenoidotomy.  The sphenoid face was followed superiorly to the skull base and then a dissection from posterior to anterior was undertaken along the skull base and lamina papyracea to remove as many ethmoid septations as possible from these structures and skeletonize them.  This dissection was carried anteriorly through the anterior ethmoids and into the frontal outflow pathway.  Super Bowl or ethmoid cells were removed.  I switched to a 70 degree curved image guidance suction which was passed easily through the frontal sinus outflow pathway into the frontal sinus itself.  Some additional anterior ethmoid cells both anterior and posterior to the frontal sinus outflow pathway were removed with a combination of pituitary rongeur and  microdebrider.  The area was packed with epinephrine soaked pledgets as needed during the case for hemostasis.    I then went to the left side and performed the exact same dissection starting with the uncinate removal and opening of the maxillary sinusotomy widely followed by an anterior to posterior dissection through the ethmoids to the face of sphenoid, opening up the sphenoid sinus widely then dissecting from posterior to anterior along the skull base and lamina to skeletonize the ethmoid cavity and finally opening up the frontal recess and removing all air cells from below the frontal ostium and confirming access to the frontal sinus itself with the image guidance suction probe.  This area was packed with epinephrine soaked pledgets.  After waiting couple minutes all of the pledgets were removed.  The middle turbinates were gently suture medialized using a 4-0 Vicryl suture.    Inferior turbinate reduction was done with an incision on the head of both inferior turbinates followed by elevating a tunnel between the mucosa and the bone.  The submucosal soft tissues were then removed with the microdebrider 3 mm blade.  The turbinates were then outfractured submucosally with the caudal elevator and extra mucosally with the Sayer elevator.  This concluded the procedure and the patient was returned to anesthesia who awakened her and brought her to recovery in good condition with no evidence of immediate complications.

## 2021-11-17 NOTE — INTERVAL H&P NOTE
Pt had tumor found on her right ovary that she will have hysterectomy and right salpingo-oophorectomy for later this month; will proceed with endoscopic surgery off all sinuses bilaterally and bilateral inferior turbinate reduction.

## 2021-11-22 RX ORDER — SERTRALINE HYDROCHLORIDE 100 MG/1
100 TABLET, FILM COATED ORAL DAILY
COMMUNITY
End: 2022-03-07

## 2021-11-22 RX ORDER — LEVOTHYROXINE SODIUM 137 UG/1
137 TABLET ORAL DAILY
COMMUNITY
End: 2021-12-21

## 2021-11-22 NOTE — PRE-PROCEDURE INSTRUCTIONS
Pre-Surgery Instructions:   Medication Instructions   • amoxicillin-pot clavulanate (AUGMENTIN) 875-125 mg per tablet Pt completes 11/27/21   • HYDROcodone-acetaminophen (NORCO) 5-325 mg per tablet Take as needed morning of surgery/procedure   • montelukast (SINGULAIR) 10 mg tablet Take as needed morning of surgery/procedure   • spironolactone (ALDACTONE) 25 mg tablet Do not take morning of surgery/procedure   • levothyroxine (SYNTHROID, LEVOTHROID) 137 mcg tablet Take morning of surgery/procedure   • drospirenone, contraceptive, (Slynd) 4 mg (28) tablet Take morning of surgery/procedure   • sertraline (ZOLOFT) 100 mg tablet Take morning of surgery/procedure   • albuterol HFA (PROVENTIL HFA;VENTOLIN HFA) 90 mcg/actuation inhaler Take as needed morning of surgery/procedure   • fluticasone propion-salmeteroL (Wixela Inhub) 250-50 mcg/dose diskus inhaler Take as needed morning of surgery/procedure

## 2021-11-23 ENCOUNTER — APPOINTMENT (OUTPATIENT)
Dept: LAB | Facility: CLINIC | Age: 46
End: 2021-11-23
Payer: COMMERCIAL

## 2021-11-23 DIAGNOSIS — N83.299 COMPLEX OVARIAN CYST: ICD-10-CM

## 2021-11-23 DIAGNOSIS — I10 ESSENTIAL HYPERTENSION: Chronic | ICD-10-CM

## 2021-11-23 LAB — SARS-COV-2 RDRP RESP QL NAA+PROBE: NEGATIVE

## 2021-11-23 PROCEDURE — 87635 SARS-COV-2 COVID-19 AMP PRB: CPT | Performed by: INTERNAL MEDICINE

## 2021-11-23 PROCEDURE — 99211 OFF/OP EST MAY X REQ PHY/QHP: CPT | Mod: CS,LAB | Performed by: INTERNAL MEDICINE

## 2021-11-23 NOTE — TELEPHONE ENCOUNTER
No new care gaps identified.  Powered by [employer] by [employer]. Reference number: [id number]. 11/23/2021 4:55:12 AM MST

## 2021-11-24 RX ORDER — SPIRONOLACTONE 25 MG/1
TABLET ORAL
Qty: 90 TABLET | Refills: 1 | Status: SHIPPED | OUTPATIENT
Start: 2021-11-24 | End: 2022-01-21 | Stop reason: DRUGHIGH

## 2021-11-29 ENCOUNTER — ANESTHESIA EVENT (OUTPATIENT)
Dept: OPERATING ROOM | Facility: HOSPITAL | Age: 46
End: 2021-11-29
Payer: COMMERCIAL

## 2021-11-29 ENCOUNTER — HOSPITAL ENCOUNTER (OUTPATIENT)
Facility: HOSPITAL | Age: 46
Setting detail: OBSERVATION
Discharge: OTHER INSTITUTION | End: 2021-11-29
Attending: OBSTETRICS & GYNECOLOGY | Admitting: OBSTETRICS & GYNECOLOGY
Payer: COMMERCIAL

## 2021-11-29 ENCOUNTER — ANESTHESIA (OUTPATIENT)
Dept: OPERATING ROOM | Facility: HOSPITAL | Age: 46
End: 2021-11-29
Payer: COMMERCIAL

## 2021-11-29 VITALS
WEIGHT: 180.12 LBS | BODY MASS INDEX: 29.07 KG/M2 | SYSTOLIC BLOOD PRESSURE: 128 MMHG | HEART RATE: 80 BPM | DIASTOLIC BLOOD PRESSURE: 78 MMHG | TEMPERATURE: 99.1 F | OXYGEN SATURATION: 96 % | RESPIRATION RATE: 11 BRPM

## 2021-11-29 LAB
ABO GROUP (TYPE) IN BLOOD: NORMAL
ANION GAP SERPL CALC-SCNC: 7 MMOL/L (ref 3–11)
ANTIBODY SCREEN: NORMAL
BASOPHILS # BLD AUTO: 0.1 10*3/UL
BASOPHILS NFR BLD AUTO: 1 % (ref 0–2)
BUN SERPL-MCNC: 7 MG/DL (ref 7–25)
CALCIUM SERPL-MCNC: 9 MG/DL (ref 8.6–10.3)
CHLORIDE SERPL-SCNC: 105 MMOL/L (ref 98–107)
CO2 SERPL-SCNC: 25 MMOL/L (ref 21–32)
CREAT SERPL-MCNC: 0.7 MG/DL (ref 0.6–1.1)
D AG BLD QL: NORMAL
EOSINOPHIL # BLD AUTO: 0.6 10*3/UL
EOSINOPHIL NFR BLD AUTO: 7 % (ref 0–3)
ERYTHROCYTE [DISTWIDTH] IN BLOOD BY AUTOMATED COUNT: 15.3 % (ref 11.5–14)
GFR SERPL CREATININE-BSD FRML MDRD: 103 ML/MIN/1.73M*2
GLUCOSE SERPL-MCNC: 85 MG/DL (ref 70–105)
HCT VFR BLD AUTO: 38.6 % (ref 34–45)
HGB BLD-MCNC: 12.5 G/DL (ref 11.5–15.5)
LYMPHOCYTES # BLD AUTO: 2.4 10*3/UL
LYMPHOCYTES NFR BLD AUTO: 32 % (ref 11–47)
MCH RBC QN AUTO: 27.2 PG (ref 28–33)
MCHC RBC AUTO-ENTMCNC: 32.5 G/DL (ref 32–36)
MCV RBC AUTO: 83.6 FL (ref 81–97)
MONOCYTES # BLD AUTO: 0.7 10*3/UL
MONOCYTES NFR BLD AUTO: 9 % (ref 3–11)
NEUTROPHILS # BLD AUTO: 3.9 10*3/UL
NEUTROPHILS NFR BLD AUTO: 51 % (ref 41–81)
PLATELET # BLD AUTO: 386 10*3/UL (ref 140–350)
PMV BLD AUTO: 8.2 FL (ref 6.9–10.8)
POTASSIUM SERPL-SCNC: 3.8 MMOL/L (ref 3.5–5.1)
RBC # BLD AUTO: 4.62 10*6/ΜL (ref 3.7–5.3)
SECOND/CONFIRMATORY ABORH PERFORMED: NORMAL
SODIUM SERPL-SCNC: 137 MMOL/L (ref 135–145)
WBC # BLD AUTO: 7.7 10*3/UL (ref 4.5–10.5)

## 2021-11-29 PROCEDURE — 6360000200 HC RX 636 W HCPCS (ALT 250 FOR IP): Performed by: ANESTHESIOLOGY

## 2021-11-29 PROCEDURE — (BLANK) HC GENERAL ANESTHESIA FACILITY CHARGE EACH ADDITIONAL MIN: Performed by: OBSTETRICS & GYNECOLOGY

## 2021-11-29 PROCEDURE — C1817 SEPTAL DEFECT IMP SYS: HCPCS | Performed by: OBSTETRICS & GYNECOLOGY

## 2021-11-29 PROCEDURE — 93005 ELECTROCARDIOGRAM TRACING: CPT | Performed by: ANESTHESIOLOGY

## 2021-11-29 PROCEDURE — 6360000200 HC RX 636 W HCPCS (ALT 250 FOR IP): Performed by: NURSE ANESTHETIST, CERTIFIED REGISTERED

## 2021-11-29 PROCEDURE — 85025 COMPLETE CBC W/AUTO DIFF WBC: CPT | Performed by: OBSTETRICS & GYNECOLOGY

## 2021-11-29 PROCEDURE — 6360000200 HC RX 636 W HCPCS (ALT 250 FOR IP): Performed by: OBSTETRICS & GYNECOLOGY

## 2021-11-29 PROCEDURE — (BLANK) HC OR LEVEL 3 PROC 1ST 15MIN: Performed by: OBSTETRICS & GYNECOLOGY

## 2021-11-29 PROCEDURE — 36415 COLL VENOUS BLD VENIPUNCTURE: CPT | Performed by: OBSTETRICS & GYNECOLOGY

## 2021-11-29 PROCEDURE — 2720000000 HC SUPP 272 WO HCPCS: Performed by: OBSTETRICS & GYNECOLOGY

## 2021-11-29 PROCEDURE — 2580000300 HC RX 258: Performed by: ANESTHESIOLOGY

## 2021-11-29 PROCEDURE — 80048 BASIC METABOLIC PNL TOTAL CA: CPT | Performed by: OBSTETRICS & GYNECOLOGY

## 2021-11-29 PROCEDURE — (BLANK) HC RECOVERY PHASE-1 1ST  HOUR ACUITY LEVEL 2: Performed by: OBSTETRICS & GYNECOLOGY

## 2021-11-29 PROCEDURE — (BLANK) HC GENERAL ANESTHESIA FACILITY CHARGE 1ST 15 MIN: Performed by: OBSTETRICS & GYNECOLOGY

## 2021-11-29 PROCEDURE — 58571 TLH W/T/O 250 G OR LESS: CPT | Performed by: OBSTETRICS & GYNECOLOGY

## 2021-11-29 PROCEDURE — (BLANK) HC OR LEVEL 3 PROC EACH ADDITIONAL MIN: Performed by: OBSTETRICS & GYNECOLOGY

## 2021-11-29 PROCEDURE — 00840 ANES IPER PX LOWER ABD NOS: CPT | Performed by: NURSE ANESTHETIST, CERTIFIED REGISTERED

## 2021-11-29 PROCEDURE — 2500000200 HC RX 250 WO HCPCS: Performed by: ANESTHESIOLOGY

## 2021-11-29 PROCEDURE — 6370000100 HC RX 637 (ALT 250 FOR IP): Performed by: OBSTETRICS & GYNECOLOGY

## 2021-11-29 PROCEDURE — 86901 BLOOD TYPING SEROLOGIC RH(D): CPT | Performed by: OBSTETRICS & GYNECOLOGY

## 2021-11-29 PROCEDURE — 49329 UNLSTD LAPS PX ABD PERTM&OMN: CPT | Mod: 59 | Performed by: OBSTETRICS & GYNECOLOGY

## 2021-11-29 RX ORDER — FENTANYL CITRATE/PF 50 MCG/ML
50 PLASTIC BAG, INJECTION (ML) INTRAVENOUS EVERY 5 MIN PRN
Status: DISCONTINUED | OUTPATIENT
Start: 2021-11-29 | End: 2021-11-29 | Stop reason: HOSPADM

## 2021-11-29 RX ORDER — SODIUM CHLORIDE 0.9 % (FLUSH) 0.9 %
2 SYRINGE (ML) INJECTION AS NEEDED
Status: DISCONTINUED | OUTPATIENT
Start: 2021-11-29 | End: 2021-11-29 | Stop reason: HOSPADM

## 2021-11-29 RX ORDER — SODIUM CHLORIDE, SODIUM LACTATE, POTASSIUM CHLORIDE, CALCIUM CHLORIDE 600; 310; 30; 20 MG/100ML; MG/100ML; MG/100ML; MG/100ML
100 INJECTION, SOLUTION INTRAVENOUS CONTINUOUS
Status: DISCONTINUED | OUTPATIENT
Start: 2021-11-29 | End: 2021-11-29 | Stop reason: HOSPADM

## 2021-11-29 RX ORDER — CELECOXIB 200 MG/1
400 CAPSULE ORAL ONCE
Status: COMPLETED | OUTPATIENT
Start: 2021-11-29 | End: 2021-11-29

## 2021-11-29 RX ORDER — KETAMINE HCL IN 0.9 % NACL 50 MG/5 ML
SYRINGE (ML) INTRAVENOUS AS NEEDED
Status: DISCONTINUED | OUTPATIENT
Start: 2021-11-29 | End: 2021-11-29 | Stop reason: SURG

## 2021-11-29 RX ORDER — METOPROLOL TARTRATE 1 MG/ML
1 INJECTION, SOLUTION INTRAVENOUS EVERY 5 MIN PRN
Status: DISCONTINUED | OUTPATIENT
Start: 2021-11-29 | End: 2021-11-29 | Stop reason: HOSPADM

## 2021-11-29 RX ORDER — DEXAMETHASONE SODIUM PHOSPHATE 4 MG/ML
INJECTION, SOLUTION INTRA-ARTICULAR; INTRALESIONAL; INTRAMUSCULAR; INTRAVENOUS; SOFT TISSUE AS NEEDED
Status: DISCONTINUED | OUTPATIENT
Start: 2021-11-29 | End: 2021-11-29 | Stop reason: SURG

## 2021-11-29 RX ORDER — FENTANYL CITRATE/PF 50 MCG/ML
PLASTIC BAG, INJECTION (ML) INTRAVENOUS AS NEEDED
Status: DISCONTINUED | OUTPATIENT
Start: 2021-11-29 | End: 2021-11-29 | Stop reason: SURG

## 2021-11-29 RX ORDER — DEXAMETHASONE SODIUM PHOSPHATE 4 MG/ML
4 INJECTION, SOLUTION INTRA-ARTICULAR; INTRALESIONAL; INTRAMUSCULAR; INTRAVENOUS; SOFT TISSUE ONCE AS NEEDED
Status: DISCONTINUED | OUTPATIENT
Start: 2021-11-29 | End: 2021-11-29 | Stop reason: HOSPADM

## 2021-11-29 RX ORDER — ONDANSETRON HYDROCHLORIDE 2 MG/ML
4 INJECTION, SOLUTION INTRAVENOUS ONCE
Status: COMPLETED | OUTPATIENT
Start: 2021-11-29 | End: 2021-11-29

## 2021-11-29 RX ORDER — LIDOCAINE HYDROCHLORIDE 20 MG/ML
INJECTION, SOLUTION EPIDURAL; INFILTRATION; INTRACAUDAL; PERINEURAL AS NEEDED
Status: DISCONTINUED | OUTPATIENT
Start: 2021-11-29 | End: 2021-11-29 | Stop reason: SURG

## 2021-11-29 RX ORDER — HEPARIN SODIUM 1000 [USP'U]/ML
INJECTION, SOLUTION INTRAVENOUS; SUBCUTANEOUS AS NEEDED
Status: DISCONTINUED | OUTPATIENT
Start: 2021-11-29 | End: 2021-11-29 | Stop reason: HOSPADM

## 2021-11-29 RX ORDER — DIPHENHYDRAMINE HYDROCHLORIDE 50 MG/ML
25 INJECTION INTRAMUSCULAR; INTRAVENOUS ONCE AS NEEDED
Status: DISCONTINUED | OUTPATIENT
Start: 2021-11-29 | End: 2021-11-29 | Stop reason: HOSPADM

## 2021-11-29 RX ORDER — ROCURONIUM BROMIDE 50 MG/5 ML
SYRINGE (ML) INTRAVENOUS AS NEEDED
Status: DISCONTINUED | OUTPATIENT
Start: 2021-11-29 | End: 2021-11-29 | Stop reason: SURG

## 2021-11-29 RX ORDER — ONDANSETRON HYDROCHLORIDE 2 MG/ML
4 INJECTION, SOLUTION INTRAVENOUS ONCE AS NEEDED
Status: DISCONTINUED | OUTPATIENT
Start: 2021-11-29 | End: 2021-11-29 | Stop reason: HOSPADM

## 2021-11-29 RX ORDER — MIDAZOLAM HYDROCHLORIDE 1 MG/ML
1 INJECTION INTRAMUSCULAR; INTRAVENOUS EVERY 5 MIN PRN
Status: DISCONTINUED | OUTPATIENT
Start: 2021-11-29 | End: 2021-11-29 | Stop reason: HOSPADM

## 2021-11-29 RX ORDER — GLYCOPYRROLATE 0.2 MG/ML
INJECTION INTRAMUSCULAR; INTRAVENOUS AS NEEDED
Status: DISCONTINUED | OUTPATIENT
Start: 2021-11-29 | End: 2021-11-29 | Stop reason: SURG

## 2021-11-29 RX ORDER — KETOROLAC TROMETHAMINE 30 MG/ML
30 INJECTION, SOLUTION INTRAMUSCULAR; INTRAVENOUS ONCE
Status: COMPLETED | OUTPATIENT
Start: 2021-11-29 | End: 2021-11-29

## 2021-11-29 RX ORDER — CEFAZOLIN SODIUM 10 G/1
2000 INJECTION, POWDER, FOR SOLUTION INTRAVENOUS ONCE
Status: COMPLETED | OUTPATIENT
Start: 2021-11-29 | End: 2021-11-29

## 2021-11-29 RX ORDER — PROPOFOL 10 MG/ML
INJECTION, EMULSION INTRAVENOUS AS NEEDED
Status: DISCONTINUED | OUTPATIENT
Start: 2021-11-29 | End: 2021-11-29 | Stop reason: SURG

## 2021-11-29 RX ORDER — DIPHENHYDRAMINE HYDROCHLORIDE 50 MG/ML
25 INJECTION INTRAMUSCULAR; INTRAVENOUS ONCE
Status: COMPLETED | OUTPATIENT
Start: 2021-11-29 | End: 2021-11-29

## 2021-11-29 RX ORDER — SODIUM CHLORIDE 0.9 % (FLUSH) 0.9 %
2 SYRINGE (ML) INJECTION EVERY 8 HOURS SCHEDULED
Status: DISCONTINUED | OUTPATIENT
Start: 2021-11-29 | End: 2021-11-29 | Stop reason: HOSPADM

## 2021-11-29 RX ORDER — ACETAMINOPHEN 500 MG
1000 TABLET ORAL ONCE
Status: COMPLETED | OUTPATIENT
Start: 2021-11-29 | End: 2021-11-29

## 2021-11-29 RX ORDER — BUPIVACAINE HYDROCHLORIDE 2.5 MG/ML
INJECTION, SOLUTION EPIDURAL; INFILTRATION; INTRACAUDAL AS NEEDED
Status: DISCONTINUED | OUTPATIENT
Start: 2021-11-29 | End: 2021-11-29 | Stop reason: HOSPADM

## 2021-11-29 RX ADMIN — SUGAMMADEX 200 MG: 100 INJECTION, SOLUTION INTRAVENOUS at 16:20

## 2021-11-29 RX ADMIN — GLYCOPYRROLATE 0.2 MG: 0.2 INJECTION, SOLUTION INTRAMUSCULAR; INTRAVENOUS at 14:19

## 2021-11-29 RX ADMIN — SODIUM CHLORIDE, POTASSIUM CHLORIDE, SODIUM LACTATE AND CALCIUM CHLORIDE: 600; 310; 30; 20 INJECTION, SOLUTION INTRAVENOUS at 13:07

## 2021-11-29 RX ADMIN — Medication 100 MCG: at 13:56

## 2021-11-29 RX ADMIN — SODIUM CHLORIDE, POTASSIUM CHLORIDE, SODIUM LACTATE AND CALCIUM CHLORIDE: 600; 310; 30; 20 INJECTION, SOLUTION INTRAVENOUS at 14:57

## 2021-11-29 RX ADMIN — Medication 20 MG: at 14:20

## 2021-11-29 RX ADMIN — FENTANYL CITRATE 100 MCG: 0.05 INJECTION, SOLUTION INTRAMUSCULAR; INTRAVENOUS at 13:11

## 2021-11-29 RX ADMIN — Medication 10 MG: at 14:53

## 2021-11-29 RX ADMIN — GLYCOPYRROLATE 0.2 MG: 0.2 INJECTION, SOLUTION INTRAMUSCULAR; INTRAVENOUS at 14:15

## 2021-11-29 RX ADMIN — DEXMEDETOMIDINE HYDROCHLORIDE 8 MCG: 4 INJECTION, SOLUTION INTRAVENOUS at 16:24

## 2021-11-29 RX ADMIN — ONDANSETRON 4 MG: 2 INJECTION INTRAMUSCULAR; INTRAVENOUS at 13:03

## 2021-11-29 RX ADMIN — FENTANYL CITRATE 50 MCG: 0.05 INJECTION, SOLUTION INTRAMUSCULAR; INTRAVENOUS at 13:51

## 2021-11-29 RX ADMIN — DIPHENHYDRAMINE HYDROCHLORIDE 25 MG: 50 INJECTION, SOLUTION INTRAMUSCULAR; INTRAVENOUS at 13:02

## 2021-11-29 RX ADMIN — DEXAMETHASONE SODIUM PHOSPHATE 4 MG: 4 INJECTION, SOLUTION INTRAMUSCULAR; INTRAVENOUS at 13:37

## 2021-11-29 RX ADMIN — Medication 10 MG: at 16:00

## 2021-11-29 RX ADMIN — Medication 1000 MG: at 12:43

## 2021-11-29 RX ADMIN — Medication 100 MCG: at 15:08

## 2021-11-29 RX ADMIN — Medication 100 MCG: at 13:44

## 2021-11-29 RX ADMIN — FENTANYL CITRATE 50 MCG: 0.05 INJECTION, SOLUTION INTRAMUSCULAR; INTRAVENOUS at 16:00

## 2021-11-29 RX ADMIN — LIDOCAINE HYDROCHLORIDE 100 MG: 20 INJECTION, SOLUTION EPIDURAL; INFILTRATION; INTRACAUDAL; PERINEURAL at 13:11

## 2021-11-29 RX ADMIN — DEXAMETHASONE SODIUM PHOSPHATE 4 MG: 4 INJECTION, SOLUTION INTRAMUSCULAR; INTRAVENOUS at 15:22

## 2021-11-29 RX ADMIN — DEXMEDETOMIDINE HYDROCHLORIDE 8 MCG: 4 INJECTION, SOLUTION INTRAVENOUS at 13:10

## 2021-11-29 RX ADMIN — Medication 10 MG: at 13:13

## 2021-11-29 RX ADMIN — Medication 10 MG: at 14:47

## 2021-11-29 RX ADMIN — PROPOFOL 180 MG: 10 INJECTION, EMULSION INTRAVENOUS at 13:11

## 2021-11-29 RX ADMIN — Medication 20 MG: at 13:31

## 2021-11-29 RX ADMIN — Medication 2000 MG: at 13:24

## 2021-11-29 RX ADMIN — CELECOXIB 400 MG: 200 CAPSULE ORAL at 12:43

## 2021-11-29 RX ADMIN — DEXMEDETOMIDINE HYDROCHLORIDE 8 MCG: 4 INJECTION, SOLUTION INTRAVENOUS at 13:20

## 2021-11-29 RX ADMIN — Medication 100 MCG: at 15:44

## 2021-11-29 RX ADMIN — FENTANYL CITRATE 50 MCG: 0.05 INJECTION, SOLUTION INTRAMUSCULAR; INTRAVENOUS at 16:32

## 2021-11-29 RX ADMIN — DEXMEDETOMIDINE HYDROCHLORIDE 8 MCG: 4 INJECTION, SOLUTION INTRAVENOUS at 13:31

## 2021-11-29 RX ADMIN — FENTANYL CITRATE 50 MCG: 0.05 INJECTION, SOLUTION INTRAMUSCULAR; INTRAVENOUS at 14:25

## 2021-11-29 RX ADMIN — Medication 50 MG: at 13:11

## 2021-11-29 RX ADMIN — FENTANYL CITRATE 50 MCG: 0.05 INJECTION, SOLUTION INTRAMUSCULAR; INTRAVENOUS at 15:28

## 2021-11-29 RX ADMIN — KETOROLAC TROMETHAMINE 30 MG: 30 INJECTION, SOLUTION INTRAMUSCULAR at 17:08

## 2021-11-29 NOTE — ANESTHESIA PROCEDURE NOTES
Airway  Urgency: elective    Airway not difficult    General Information and Staff    Patient location during procedure: OR  CRNA: Shira May CRNA  Performed: CRNA     Indications and Patient Condition  Indications for airway management: anesthesia and airway protection  Spontaneous Ventilation: absent  Sedation level: deep  Preoxygenated: yes  Patient position: sniffing  Mask difficulty assessment: 1 - vent by mask    Final Airway Details  Final airway type: endotracheal airway      Successful airway: ETT  Cuffed: yes   Successful intubation technique: direct laryngoscopy  Facilitating devices/methods: stylet  Blade: Ita  Blade size: #3  ETT size (mm): 7.0  Cormack-Lehane Classification: grade I - full view of glottis  Placement verified by: chest auscultation, capnometry and palpation of cuff   Measured from: teeth  ETT to teeth (cm): 21  Number of attempts at approach: 1    Additional Comments  Atraumatic intubation by susan Phillips. Oral structures and dentition unchanged.

## 2021-11-29 NOTE — ANESTHESIA PREPROCEDURE EVALUATION
"Pre-Procedure Assessment    Patient: Tamiko Tiwari, female, 46 y.o.    Ht Readings from Last 1 Encounters:   11/17/21 1.676 m (5' 6\")     Wt Readings from Last 1 Encounters:   11/29/21 81.7 kg (180 lb 1.9 oz)       Last Vitals  BP      Temp 36.9 °C (98.5 °F) (11/29/21 1147)    Pulse 78 (11/29/21 1147)   Resp 16 (11/29/21 1147)    SpO2 99 % (11/29/21 1147)    Pain Score 0 - No pain (11/29/21 1147)       Problem list reviewed and Medical history reviewed           Airway   Mallampati: II  TM distance: >3 FB  Neck ROM: full      Dental      Pulmonary     breath sounds clear to auscultation  (+) asthma,   Cardiovascular   (+) hypertension,     Rhythm: regular  Rate: normal    Mental Status/Neuro/Psych    Pt is alert.      (+) psychiatric history,     GI/Hepatic/Renal - negative ROS     Endo/Other    (+) hypothyroidism,   Abdominal           Social History     Tobacco Use   • Smoking status: Never Smoker   • Smokeless tobacco: Never Used   Substance Use Topics   • Alcohol use: Yes     Alcohol/week: 0.0 standard drinks     Comment: occ      Hematology   WBC   Date Value Ref Range Status   11/29/2021 7.7 4.5 - 10.5 10*3/uL Final     RBC   Date Value Ref Range Status   11/29/2021 4.62 3.70 - 5.30 10*6/µL Final     MCV   Date Value Ref Range Status   11/29/2021 83.6 81.0 - 97.0 fL Final     Hemoglobin   Date Value Ref Range Status   11/29/2021 12.5 11.5 - 15.5 g/dL Final     Hematocrit   Date Value Ref Range Status   11/29/2021 38.6 34.0 - 45.0 % Final     Platelets   Date Value Ref Range Status   11/29/2021 386 (H) 140 - 350 10*3/uL Final      Coagulation No results found for: PT, APTT, INR   General Chemistry   Calcium   Date Value Ref Range Status   10/27/2021 8.6 8.6 - 10.3 mg/dL Final     BUN   Date Value Ref Range Status   10/27/2021 10 7 - 25 mg/dL Final     Creatinine   Date Value Ref Range Status   10/27/2021 1.07 0.60 - 1.10 mg/dL Final     Glucose   Date Value Ref Range Status   10/27/2021 94 70 - 105 mg/dL " Final     Sodium   Date Value Ref Range Status   10/27/2021 137 135 - 145 mmol/L Final     Potassium   Date Value Ref Range Status   10/27/2021 3.8 3.5 - 5.1 MMOL/L Final     Magnesium   Date Value Ref Range Status   05/31/2018 2.1 1.8 - 2.4 mg/dL Final     CO2   Date Value Ref Range Status   10/27/2021 25 21 - 32 mmol/L Final     Chloride   Date Value Ref Range Status   10/27/2021 105 98 - 107 mmol/L Final     Anesthesia Plan    ASA 3   NPO status reviewed: > 6 hours    General         Induction: intravenous   Airway Planning: oral ET tube          Plan for postoperative opioid use.    Anesthetic plan and risks discussed with patient.  Use of blood products discussed with patient who consented to blood products.

## 2021-11-30 PROCEDURE — 93010 ELECTROCARDIOGRAM REPORT: CPT | Performed by: INTERNAL MEDICINE

## 2021-11-30 PROCEDURE — 80048 BASIC METABOLIC PNL TOTAL CA: CPT | Performed by: OBSTETRICS & GYNECOLOGY

## 2021-11-30 PROCEDURE — 85025 COMPLETE CBC W/AUTO DIFF WBC: CPT | Performed by: OBSTETRICS & GYNECOLOGY

## 2021-11-30 NOTE — OP NOTE
Operative Report  Gynecologic Oncology  Formerly [facility], [facility]    Date of Surgery: 21    Pre-operative Diagnosis:  1. Complex right adnexal mass    Post-operative Diagnosis:  1. Complex right adnexal mass    Informed Consent:  Tamiko Tiwari and LISA reviewed in details the risks and benefits of proceeding with surgery. She consented to proceed with a total laparoscopic hysterectomy, right salpingo-oophorectomy, left salpingectomy, possible oophorectomy, possible lymphadenectomy, possible omentectomy. All questions were answered to Tamiko Tiwari's satisfaction and she signed consents for this procedure.    Procedure:  1. Exam under anesthesia  2. Total laparoscopic hysterectomy  3. Right salpingo-oophorectomy  4. Left salpingectomy  5. Omental Biopsy  6. Cystoscopy    Surgeon: April Cao MD    Assistant: Oumar FLYNN    Anesthesia: General endotracheal    Findings: The uterus was boggy and enlarged approximately 10 weeks size and sounded to 11 cm.  The right ovary was approximately 5 cm in diameter and did not have any gross evidence of tumor.  It was smooth and cystic.  The left ovary was completely normal in appearance.  Bilateral fallopian tubes were normal in appearance.  The bladder was densely adherent to the cervix.  The bladder was folded on top of itself with filmy adhesions and then densely adherent the  scar. The appendix was normal and the small bowel and large bowel were normal in appearance    Specimens:  uterus (weight deferred to pathology), bilateral tubes, right ovary, omental biopsy, pelvic washings    Fluids: 1500 cc    Urine Output: 450 cc    Estimated Blood Loss: 150 cc    Drains: None    Complications: None    Condition/Disposition: PACU to floor    Description of Procedure:    Following informed consent, Tamiko Tiwariwas taken to the operating room.  Identity and planned procedure was confirmed upon arrival to the operating room.  General anesthesia was induced.   She was positioned in dorsal lithotomy with arms tucked at side taking care to avoid all pressure points.  The vagina and perineum were prepared with Betadine and the abdomen was prepared in the usual fashion with chlorhexidine prep.  She was draped in usual sterile fashion.  A timeout was performed and all present were in agreement with the planned procedure.    A Mckeon catheter was inserted under sterile conditions.  A speculum was placed in the vagina.  The anterior cervix was grasped with a single-tooth tenaculum.  The uterus sounded to 11 cm.  The cervix was sequentially dilated to accommodate the Mary 1 tip.  The Mary I manipulator was assembled using a 10 cm tip, small Saqib Ring and pneumo-occluder.  The anterior cervix was sutured with a 0 PDS in figure-of-eight fashion to assist in placement of the device.  The sutures were threaded through the manipulator.  The manipulator was then placed through the cervical os ensuring that the Saqib ring was approximated to the cervicovaginal junction.  Both the intracavitary and pneumooccluder balloons were inflated with an appropriate quantity of fluid.  Gloves were changed prior to proceeding abdominally.    A 1 cm vertical intraumbilical incision was made with a scalpel.  The subcutaneous tissue was  using tonsils and S retractors.  The fascia was grasped with 2 Kocher clamps and sharply incised. A 0 Vicryl suture was placed at the angle of each of these incisions. The posterior sheath and peritoneum was grasped with tonsils and incised.  An S retractor was placed intra-abdominally and the incision was digitally explored ensuring there was no adhesive disease.  A 12 mm trocar was placed and the abdomen was insufflated.  A complete abdominal survey was performed with the above findings.  The trocar balloon was insufflated.  The patient was placed in Trendelenburg to allow visualization of the pelvis.  2 additional 5 mm ports were placed in the lower quadrants  approximately 2 cm superior and medial to the ASIS taking care to avoid the inferior epigastrics.  An additional port was placed on the patient's right side approximately 10 cm superior to the right lower port.  The bowel was swept out of the pelvis using blunt instruments taking care to avoid injury.    The right round was then sealed and divided using the harmonic scalpel. This incision was carried superiorly paralleling the infundibulopelvic ligament.  The retroperitoneum was explored and the ureter was identified.  The infundibulopelvic ligament was then sequentially sealed with the harmonic scalpel and divided.  The posterior incision was carried medially towards the uterus allowing the ureter to fall away from the specimen.  Anteriorly the incision was carried down towards the bladder to begin developing the bladder flap.  The bladder was mobilized below the level of the Koh ring.  The uterine vessel on the right side was sealed and divided and then lateralized by sealing and dividing medially to release the cardinal ligament.  On the left side, the left tube was sequentially sealed and divided using the harmonic scalpel in the mesosalpinx.  The utero-ovarian ligament was sequentially sealed and divided and then the left round ligament was opened.  The remainder of the dissection was performed as was done on the right side.      The central portion of the bladder that was densely adherent was taken down using laparoscopic scissors and the harmonic scalpel where necessary to dissect the bladder well off of the Koh ring.    Colpotomy was then made using monopolar cautery.  The uterus, cervix ,right tube and ovary were delivered through the vagina and sent to pathology.  A frozen section was performed on the right ovary.  A 0 vicrylwas then placed through the 12 mm trocar.  The vagina was then closed in running fashion in a double layer.    A diagnostic cystoscopy was then performed.  The Mckeon catheter was  removed and the 70 degree cystoscope was placed into the bladder instilling 200 cc of fluid.  There was bilateral reflux from the ureteral orifices.  There was no visualized suture within the bladder.  Gloves were changed again prior to returning abdomen.    Frozen pathology returned with a seromucinous borderline.  The abdomen was reinsufflated and an omental biopsy was performed by grasping a portion of the infracolic omentum while away from the colon and sequentially sealing and dividing it with the harmonic scalpel.  The omental biopsy was placed in a 10 mm bag and was sent to pathology for permanent section.    The abdomen was irrigated. Excellent hemostasis was achieved.  The fascia at the supraumbilical site was closed with 0-vicryl.  The skin was closed with a running subcuticular of 4-0 Monocryl and Steri-Strips were placed at all port sites.  A Primapore dressings were placed at the incisions.    All counts were correct x2.  Patient was taken out of dorsolithotomy and general anesthesia was reversed.  She was transferred to the PACU in stable condition.    April Cao MD  [phone]

## 2021-11-30 NOTE — PERIOPERATIVE NURSING NOTE
Left 18g IV SL. Mckeon catheter drainage bag placed on patient abdomen for transport. Светлана pad and undergarment placed on patient. Mild amount of bloody drainage on linen under patient.

## 2021-11-30 NOTE — PERIOPERATIVE NURSING NOTE
Patient moved self from OR cart to stretcher.  [facility] Department [facility] [place]. IV catheter intact. SL. Mckeon catheter. SCD's. One bag of patient belongings. Ice pack.

## 2021-11-30 NOTE — ANESTHESIA POSTPROCEDURE EVALUATION
Patient: Tamiko Tiwari    Procedure Summary     Date: 11/29/21 Room / Location: [facility] OR 05 / [facility] OR    Anesthesia Start: 1307 Anesthesia Stop: 1642    Procedure: LAPAROSCOPIC HYSTERECTOMY, right salpingo-oophorectomy, left salpingectomy, cystoscopy, omental biopsy. (N/A Abdomen) Diagnosis:       Complex ovarian cyst      (Complex ovarian cyst [N83.299])    Surgeons: April Cao MD Responsible Provider: PRADEEP Jeffries MD    Anesthesia Type: general ASA Status: 3          Anesthesia Type: general    Last vitals  Vitals Value Taken Time   /78 11/29/21 1730   Temp 37.3 °C (99.1 °F) 11/29/21 1748   Pulse 80 11/29/21 1730   Resp 11 11/29/21 1748   SpO2 96 % 11/29/21 1730   0-10 Pain Score 0 - No pain 11/29/21 1710       Anesthesia Post Evaluation    Patient location during evaluation: PACU  Patient participation: complete - patient participated  Level of consciousness: awake and alert  Pain management: adequate  Airway patency: patent  Anesthetic complications: no  Cardiovascular status: acceptable  Respiratory status: acceptable  Hydration status: acceptable  May dismiss recovered patient based on consultation with the appropriate physicians and/or meeting appropriate discharge criteria      Cosmetic?  This procedure is not cosmetic.

## 2021-12-01 NOTE — DISCHARGE SUMMARY
Patient was seen and evaluated in PACU. She was deemed stable for [facility] care. She was transferred from the PACU in stable condition and transferred via ambulance. She had adequately controlled pain and no other concerns.

## 2021-12-02 ENCOUNTER — OFFICE VISIT (OUTPATIENT)
Dept: OTOLARYNGOLOGY | Facility: CLINIC | Age: 46
End: 2021-12-02
Payer: COMMERCIAL

## 2021-12-02 VITALS
RESPIRATION RATE: 15 BRPM | HEART RATE: 88 BPM | SYSTOLIC BLOOD PRESSURE: 160 MMHG | TEMPERATURE: 98 F | DIASTOLIC BLOOD PRESSURE: 100 MMHG

## 2021-12-02 DIAGNOSIS — R21 RASH: ICD-10-CM

## 2021-12-02 DIAGNOSIS — Z09 POSTOPERATIVE EXAMINATION: Primary | ICD-10-CM

## 2021-12-02 PROCEDURE — 99024 POSTOP FOLLOW-UP VISIT: CPT | Performed by: NURSE PRACTITIONER

## 2021-12-02 PROCEDURE — 31231 NASAL ENDOSCOPY DX: CPT | Mod: 79 | Performed by: NURSE PRACTITIONER

## 2021-12-02 RX ORDER — TRIAMCINOLONE ACETONIDE 1 MG/G
1 OINTMENT TOPICAL 2 TIMES DAILY
Qty: 30 G | Refills: 0 | Status: SHIPPED | OUTPATIENT
Start: 2021-12-02 | End: 2022-02-18 | Stop reason: ALTCHOICE

## 2021-12-02 RX ORDER — FLUTICASONE PROPIONATE 50 MCG
1 SPRAY, SUSPENSION (ML) NASAL DAILY
Qty: 16 G | Refills: 5 | Status: SHIPPED | OUTPATIENT
Start: 2021-12-02 | End: 2022-07-12 | Stop reason: ALTCHOICE

## 2021-12-02 ASSESSMENT — PAIN SCALES - GENERAL: PAINLEVEL: 3

## 2021-12-02 NOTE — PROGRESS NOTES
Subjective   CC: post operative exam    Patient ID: Tamiko Tiwari is a 46 y.o. female.    HPI  Pt is 2 weeks post operative FESS. She states that she is doing pretty well. She tolerated antibiotics ok. She states that she took pain meds for 1 day after surgery and other than that she has just taken over the counter tylenol. She denies fever, chills, or body aches.     She states that she does have a rash on the left side of her abdomen that she would like looked at. She states that it is itchy but not painful.     Review of Systems see HPI    No Known Allergies  Current Outpatient Medications   Medication Sig Dispense Refill   • triamcinolone (KENALOG) 0.1 % ointment Apply 1 application topically 2 (two) times a day for 10 days 30 g 0   • Wixela Inhub 250-50 mcg/dose diskus inhaler 1 puff 2 (two) times a day     • spironolactone (ALDACTONE) 25 mg tablet TAKE 1 TABLET(25 MG) BY MOUTH DAILY 90 tablet 1   • levothyroxine (SYNTHROID, LEVOTHROID) 137 mcg tablet Take 137 mcg by mouth daily Take 1 tablet by mouth daily     • sertraline (Zoloft) 100 mg tablet Take 100 mg by mouth daily Take one tablet by mouth daily     • montelukast (SINGULAIR) 10 mg tablet Take 1 tablet (10 mg total) by mouth nightly 30 tablet 11   • albuterol HFA (PROVENTIL HFA;VENTOLIN HFA) 90 mcg/actuation inhaler Inhale 2 puffs every 2 (two) hours as needed for wheezing (every 2-3 hours as needed for cough/wheezing) 1 Inhaler 1     No current facility-administered medications for this visit.     Past Medical History:   Diagnosis Date   • Allergic Pollen -    • Complex ovarian cyst    • Depression    • Endocrine disorder    • Hypertension    • Hypothyroid    • Iron deficiency 2020   • Psychiatric illness     depression   • Respiratory disease    • Severe persistent asthma with acute exacerbation 2020    Eosinophilic asthma; [place]     Past Surgical History:   Procedure Laterality Date   •  SECTION     • NASAL TURBINATE  REDUCTION Bilateral 11/17/2021    Procedure: BILATERAL INFERIOR TURBINATE REDUCTION;  Surgeon: Ally Carmen MD;  Location: [facility] SURGICAL SERVICES;  Service: ENT;  Laterality: Bilateral;   • SINUS SURGERY Bilateral 11/17/2021    Procedure: FUNCTIONAL ENDOSCOPIC SINUS SURGERY WITH COMPUTER NAVIGATION - BILATERAL MAXILLAY and  ANTERIOR/POSTERIOR ETHMOID and SPHENOID  AND FRONTAL and BILATERAL INFERIOR TURBINATE REDUCTION (2 HR);  Surgeon: Ally Carmen MD;  Location: [facility] SURGICAL SERVICES;  Service: ENT;  Laterality: Bilateral;   • SINUS SURGERY  11/17/2021       Objective   /100 (BP Location: Right arm, Patient Position: Sitting, Cuff Size: Regular Adult)   Pulse 88   Temp 36.7 °C (98 °F) (Temporal)   Resp 15     Physical Exam    PHYSICAL EXAMINATION:  GENERAL:  Well-developed, well-nourished.  The patient is alert, oriented and in no acute distress.    PSYCH: Normal mood and affect.   SKIN: No evidence of significant rash or concerning skin lesion on the head or neck.  HEAD/FACE:  Normally formed without evidence of mass or trauma.  The sinuses are nontender.    EARS: Bilateral external ears are normal.  Bilateral external auditory canals are normal.  Bilateral tympanic membranes intact and normal.  NOSE:  The external nose appears normal.  The septum is mostly midline.  The turbinates appear normal.    ORAL CAVITY/OROPHARYNX:  There are no focal masses or lesions.  There are normal mucous membranes.  The tonsils appear normal without mass or focal lesion.  Oropharynx mucosa appears normal.  NECK:  There is no palpable adenopathy, goiter or mass.  The trachea is midline.  SKIN: Pt has a erythemic smooth rash to the left side of her abdomen. She does have tape markings with the rash. This appears to be an irritation from adhesive.       After topical anesthesia with lidocaine and Afrin spray followed by topical anesthesia with tetracaine and lidocaine jelly on a cottonoid left in place for 10 minutes  bilateral nasal endoscopy was done.  A 30 degree telescope was used.   She also had scar tissue on the right in the front of the middle turbinate. I gently tried to break through this using freer. I wasn't able to completely breath through this. She still had gel foam in place and I was able to remove some with suction around the scar tissue. No pus or polyps.  Propel stent in place on the right side.   Turbinate nicely medialized bilaterally. Same debridement with suction was done on the left side.         Diagnosis Plan   1. Postoperative examination  fluticasone propionate (FLONASE) 50 mcg/actuation nasal spray   2. Rash  triamcinolone (KENALOG) 0.1 % ointment     Nasal endoscopy was done; see procedure note. Encouraged her to continue nasal rinses and will have her start flonase. She is to see Dr. Carmen in 2 weeks or sooner with concerns.     Triamcinolone cream ordered for rash; pt was educated on dose and frequency. She is to follow up with PCP if this does not improve.      FELIX SKELTON, CNP

## 2021-12-16 ENCOUNTER — OFFICE VISIT (OUTPATIENT)
Dept: OTOLARYNGOLOGY | Facility: CLINIC | Age: 46
End: 2021-12-16
Payer: COMMERCIAL

## 2021-12-16 VITALS
HEART RATE: 98 BPM | DIASTOLIC BLOOD PRESSURE: 86 MMHG | WEIGHT: 179 LBS | HEIGHT: 66 IN | TEMPERATURE: 98.2 F | OXYGEN SATURATION: 96 % | BODY MASS INDEX: 28.77 KG/M2 | SYSTOLIC BLOOD PRESSURE: 145 MMHG

## 2021-12-16 DIAGNOSIS — J32.4 CHRONIC PANSINUSITIS: Primary | ICD-10-CM

## 2021-12-16 PROCEDURE — 31237 NSL/SINS NDSC SURG BX POLYPC: CPT | Performed by: OTOLARYNGOLOGY

## 2021-12-16 RX ORDER — PREDNISONE 20 MG/1
TABLET ORAL
Qty: 32 TABLET | Refills: 0 | Status: SHIPPED | OUTPATIENT
Start: 2021-12-16 | End: 2022-02-18 | Stop reason: ALTCHOICE

## 2021-12-16 RX ORDER — PSEUDOEPHEDRINE HCL 120 MG/1
120 TABLET, FILM COATED, EXTENDED RELEASE ORAL EVERY 12 HOURS
Qty: 28 TABLET | Refills: 0 | Status: SHIPPED | OUTPATIENT
Start: 2021-12-16 | End: 2021-12-30

## 2021-12-16 RX ORDER — CLARITHROMYCIN 250 MG/1
250 TABLET, FILM COATED ORAL 2 TIMES DAILY
Qty: 28 TABLET | Refills: 0 | Status: SHIPPED | OUTPATIENT
Start: 2021-12-16 | End: 2021-12-30

## 2021-12-16 ASSESSMENT — ENCOUNTER SYMPTOMS
SINUS PRESSURE: 0
SORE THROAT: 0
TROUBLE SWALLOWING: 0
RHINORRHEA: 0
FEVER: 0
CONSTITUTIONAL NEGATIVE: 1
CHILLS: 0

## 2021-12-16 NOTE — PROGRESS NOTES
Subjective    CC: Follow-up sinus surgery    HPI: Tamiko Tiwari is a 46 y.o. female who is now 4-week status post endoscopic sinus surgery including all sinuses as well as bilateral inferior turbinate reduction.  She states her nose feels congested.  She used the Afrin some for couple days postop and stopped it for a little while and for the last 2 or 3 weeks has been using it continuously twice a day.  She is doing her sinus rinses twice a day as well as her daily Flonase.  She mostly just feels like her nose is stuffed up and everything is swollen and congested.  No purulent discharge.  No pain.    Past Medical History:   Diagnosis Date   • Allergic Pollen -    • Complex ovarian cyst    • Depression    • Endocrine disorder    • Hypertension    • Hypothyroid    • Iron deficiency 2020   • Psychiatric illness     depression   • Respiratory disease    • Severe persistent asthma with acute exacerbation 2020    Eosinophilic asthma; [place]       Past Surgical History:   Procedure Laterality Date   •  SECTION     • LAPAROSCOPIC HYSTERECTOMY N/A 2021    Procedure: LAPAROSCOPIC HYSTERECTOMY, right salpingo-oophorectomy, left salpingectomy, cystoscopy, omental biopsy.;  Surgeon: April Cao MD;  Location: [facility] OR;  Service: Gynecology;  Laterality: N/A;   • NASAL TURBINATE REDUCTION Bilateral 2021    Procedure: BILATERAL INFERIOR TURBINATE REDUCTION;  Surgeon: Ally Carmen MD;  Location: [facility] SURGICAL SERVICES;  Service: ENT;  Laterality: Bilateral;   • SINUS SURGERY Bilateral 2021    Procedure: FUNCTIONAL ENDOSCOPIC SINUS SURGERY WITH COMPUTER NAVIGATION - BILATERAL MAXILLAY and  ANTERIOR/POSTERIOR ETHMOID and SPHENOID  AND FRONTAL and BILATERAL INFERIOR TURBINATE REDUCTION (2 HR);  Surgeon: Ally Carmen MD;  Location: [facility] SURGICAL SERVICES;  Service: ENT;  Laterality: Bilateral;   • SINUS SURGERY  2021         Current Outpatient Medications:   •  triamcinolone  (KENALOG) 0.1 % ointment, Apply 1 application topically 2 (two) times a day for 10 days, Disp: 30 g, Rfl: 0  •  fluticasone propionate (FLONASE) 50 mcg/actuation nasal spray, Administer 1 spray into each nostril daily, Disp: 16 g, Rfl: 5  •  Wixela Inhub 250-50 mcg/dose diskus inhaler, 1 puff 2 (two) times a day, Disp: , Rfl:   •  spironolactone (ALDACTONE) 25 mg tablet, TAKE 1 TABLET(25 MG) BY MOUTH DAILY, Disp: 90 tablet, Rfl: 1  •  levothyroxine (SYNTHROID, LEVOTHROID) 137 mcg tablet, Take 137 mcg by mouth daily Take 1 tablet by mouth daily, Disp: , Rfl:   •  sertraline (Zoloft) 100 mg tablet, Take 100 mg by mouth daily Take one tablet by mouth daily, Disp: , Rfl:   •  montelukast (SINGULAIR) 10 mg tablet, Take 1 tablet (10 mg total) by mouth nightly, Disp: 30 tablet, Rfl: 11  •  albuterol HFA (PROVENTIL HFA;VENTOLIN HFA) 90 mcg/actuation inhaler, Inhale 2 puffs every 2 (two) hours as needed for wheezing (every 2-3 hours as needed for cough/wheezing), Disp: 1 Inhaler, Rfl: 1    No Known Allergies    family history includes Alcohol abuse in her maternal grandfather and paternal grandfather; Asthma in her son; Cancer in her mother's sister; Eczema in her son; Heart failure in her maternal grandmother; Hypertension in her brother and mother; Multiple sclerosis in her father's brother; Other in her mother's brother and sister; Pacemaker/AICD in her paternal grandmother; Prostate cancer in her father; Stroke in her mother's brother; Tachycardia in her sister.    Social History     Tobacco Use   • Smoking status: Never Smoker   • Smokeless tobacco: Never Used   Vaping Use   • Vaping Use: Never used   Substance Use Topics   • Alcohol use: Yes     Alcohol/week: 0.0 standard drinks     Comment: occ   • Drug use: No       Review of Systems   Constitutional: Negative.  Negative for chills and fever.   HENT: Positive for congestion. Negative for ear discharge, ear pain, hearing loss, nosebleeds, postnasal drip, rhinorrhea,  "sinus pressure, sneezing, sore throat, tinnitus and trouble swallowing.        Objective    /86 (BP Location: Right arm, Patient Position: Sitting, Cuff Size: Long Adult)   Pulse 98   Temp 36.8 °C (98.2 °F) (Temporal)   Ht 1.676 m (5' 6\")   Wt 81.2 kg (179 lb)   SpO2 96%   BMI 28.89 kg/m²     General: Well-developed well-nourished, no acute distress.    Head/face: Normocephalic and atraumatic.    Nose: There are some crusts and discharge noted around the middle meatus consistent with recent sinus surgery.    Procedure: Nasal endoscopy with debridement.  A 30 degree telescope was used for the exam.  On both sides the nasal mucosa is edematous in spite of extensive decongestion with lidocaine/Afrin spray and numbing with tetracaine/lidocaine jelly.  There is a very tight opening to the middle meatus on the right.  Some residual sinus foam dressing material was removed with suction.  No polyps or pus seen but the scope was not able to be advanced into the ethmoid cavity itself.  On the left there is recurrent synechia at the head of the middle turbinate.  This was lysed with suction.  Some material was suctioned from the ethmoid cavity and again on the left the scope could not be advanced into the ethmoid cavity due to tightness and swollen tissues.  The space at the head of the middle turbinate and opening of the middle meatus on both sides was stented with xerogel absorbable dressing material which was then infiltrated with triamcinolone.    Diagnosis    1. Chronic pansinusitis        Recommendations    1.  Patient with a lot of edema in the nose.  We need to get her off the Afrin and I recommended dilutional technique for that which I explained to her.  2.  To treat the ongoing congestion and postoperative edema that she has we will treat with steroids, low-dose clarithromycin for its antiinflammatory effect as well as some Sudafed.  Follow-up in 3 weeks.  "

## 2021-12-21 DIAGNOSIS — E03.9 ACQUIRED HYPOTHYROIDISM: Primary | ICD-10-CM

## 2021-12-21 RX ORDER — LEVOTHYROXINE SODIUM 137 UG/1
TABLET ORAL
Qty: 90 TABLET | Refills: 3 | Status: SHIPPED | OUTPATIENT
Start: 2021-12-21 | End: 2022-12-27 | Stop reason: DRUGHIGH

## 2021-12-21 NOTE — TELEPHONE ENCOUNTER
No new care gaps identified.  Powered by [employer]. Reference number: [id number]. 12/21/2021 4:55:14 AM MST

## 2021-12-21 NOTE — TELEPHONE ENCOUNTER
Medication refill request:  Medication(s):  levothyroxine not filled due to (route to Nurse Pool) Historical Provider, please review for refill

## 2022-01-04 ENCOUNTER — OFFICE VISIT (OUTPATIENT)
Dept: ONCOLOGY | Facility: CLINIC | Age: 47
End: 2022-01-04
Payer: COMMERCIAL

## 2022-01-04 VITALS
WEIGHT: 181 LBS | RESPIRATION RATE: 16 BRPM | HEART RATE: 79 BPM | TEMPERATURE: 97.7 F | DIASTOLIC BLOOD PRESSURE: 98 MMHG | OXYGEN SATURATION: 96 % | SYSTOLIC BLOOD PRESSURE: 148 MMHG | BODY MASS INDEX: 29.21 KG/M2

## 2022-01-04 DIAGNOSIS — N83.299 COMPLEX OVARIAN CYST: Primary | ICD-10-CM

## 2022-01-04 DIAGNOSIS — D39.10 BORDERLINE EPITHELIAL NEOPLASM OF OVARY: ICD-10-CM

## 2022-01-04 DIAGNOSIS — I10 ESSENTIAL HYPERTENSION: Chronic | ICD-10-CM

## 2022-01-04 PROBLEM — N94.89 ADNEXAL MASS: Status: RESOLVED | Noted: 2021-11-16 | Resolved: 2022-01-04

## 2022-01-04 PROCEDURE — G0463 HOSPITAL OUTPT CLINIC VISIT: HCPCS

## 2022-01-04 PROCEDURE — 99024 POSTOP FOLLOW-UP VISIT: CPT | Performed by: OBSTETRICS & GYNECOLOGY

## 2022-01-04 ASSESSMENT — ENCOUNTER SYMPTOMS
SHORTNESS OF BREATH: 0
CHEST TIGHTNESS: 0
FEVER: 0
HOT FLASHES: 1
CHILLS: 0
PALPITATIONS: 0
CONSTIPATION: 0
FATIGUE: 1
NAUSEA: 0
ABDOMINAL DISTENTION: 0
VOMITING: 0
COUGH: 0
DIARRHEA: 0
ABDOMINAL PAIN: 0

## 2022-01-04 NOTE — PROGRESS NOTES
[facility]  Gynecologic Oncology  Post-operative Visit    Chief Complaint: Post operative visit    History of Present Illness:   Tamiko Tiwari is a 46 y.o.  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  She is recovered very well from surgery.  She is having normal bowel and bladder function.  She is not having any vaginal bleeding.    She was treated with steroids recently for sinus edema following her sinus surgery.  She has follow-up scheduled on 2022.  Her blood pressures have been elevated at the last few visits and on manual recheck today it is 148/98.  She will contact Dr. Duran next week and will check her blood pressures daily.    Review of Systems   Review of Systems   Constitutional: Positive for fatigue. Negative for chills and fever.   Respiratory: Negative for chest tightness, cough and shortness of breath.    Cardiovascular: Negative for chest pain and palpitations.   Gastrointestinal: Negative for abdominal distention, abdominal pain, constipation, diarrhea, nausea and vomiting.   Endocrine: Positive for hot flashes.   Genitourinary: Negative for vaginal bleeding and vaginal discharge.    All other systems reviewed and are negative.      Past Medical History:  Past Medical History:   Diagnosis Date   • Allergic Pollen - 2019   • Complex ovarian cyst    • Depression    • Endocrine disorder    • Hypertension    • Hypothyroid    • Iron deficiency 2020   • Psychiatric illness     depression   • Respiratory disease    • Severe persistent asthma with acute exacerbation 2020    Eosinophilic asthma; [place]       Current Medications:    Current Outpatient Medications:   •  levothyroxine (SYNTHROID, LEVOTHROID) 137 mcg tablet, TAKE 1 TABLET BY MOUTH DAILY, Disp: 90 tablet, Rfl: 3  •  fluticasone propionate (FLONASE) 50 mcg/actuation nasal spray, Administer 1 spray into each nostril  daily, Disp: 16 g, Rfl: 5  •  Wixela Inhub 250-50 mcg/dose diskus inhaler, 1 puff 2 (two) times a day, Disp: , Rfl:   •  spironolactone (ALDACTONE) 25 mg tablet, TAKE 1 TABLET(25 MG) BY MOUTH DAILY, Disp: 90 tablet, Rfl: 1  •  sertraline (Zoloft) 100 mg tablet, Take 100 mg by mouth daily Take one tablet by mouth daily, Disp: , Rfl:   •  montelukast (SINGULAIR) 10 mg tablet, Take 1 tablet (10 mg total) by mouth nightly, Disp: 30 tablet, Rfl: 11  •  albuterol HFA (PROVENTIL HFA;VENTOLIN HFA) 90 mcg/actuation inhaler, Inhale 2 puffs every 2 (two) hours as needed for wheezing (every 2-3 hours as needed for cough/wheezing), Disp: 1 Inhaler, Rfl: 1  •  predniSONE (DELTASONE) 20 mg tablet, Take 1 tablet PO TID x 7 days THEN 1 tablet PO BID x 3 days THEN 1 tablet PO QD x 3 days THEN 1/2 tablet PO x 3 days with food (Patient not taking: Reported on 2022 ), Disp: 32 tablet, Rfl: 0  •  triamcinolone (KENALOG) 0.1 % ointment, Apply 1 application topically 2 (two) times a day for 10 days, Disp: 30 g, Rfl: 0     Allergies:  The patient is allergic to adhesive tape-silicones.    Past Surgical History:  Past Surgical History:   Procedure Laterality Date   •  SECTION     • LAPAROSCOPIC HYSTERECTOMY N/A 2021    Procedure: LAPAROSCOPIC HYSTERECTOMY, right salpingo-oophorectomy, left salpingectomy, cystoscopy, omental biopsy.;  Surgeon: April Cao MD;  Location: [facility] OR;  Service: Gynecology;  Laterality: N/A;   • NASAL TURBINATE REDUCTION Bilateral 2021    Procedure: BILATERAL INFERIOR TURBINATE REDUCTION;  Surgeon: Ally Carmen MD;  Location: [facility];  Service: ENT;  Laterality: Bilateral;   • SINUS SURGERY Bilateral 2021    Procedure: FUNCTIONAL ENDOSCOPIC SINUS SURGERY WITH COMPUTER NAVIGATION - BILATERAL MAXILLAY and  ANTERIOR/POSTERIOR ETHMOID and SPHENOID  AND FRONTAL and BILATERAL INFERIOR TURBINATE REDUCTION (2 HR);  Surgeon: Ally Carmen MD;  Location: [facility] SURG[facility]  SERVICES;  Service: ENT;  Laterality: Bilateral;   • SINUS SURGERY  11/17/2021       Family History:  Cancer-related family history includes Cancer in her mother's sister; Prostate cancer in her father. There is no history of Ovarian cancer, Pancreatic cancer, Melanoma, Breast cancer, or Uterine cancer.    Social History:  Social History     Tobacco Use   • Smoking status: Never Smoker   • Smokeless tobacco: Never Used   Vaping Use   • Vaping Use: Never used   Substance Use Topics   • Alcohol use: Yes     Alcohol/week: 0.0 standard drinks     Comment: occ   • Drug use: No       OB/GYN History: 1 SAB, 1 C/S, no h/o abnormal pap smears    Physical Exam:  Vitals:    01/04/22 0859 01/04/22 0900   BP: 171/93 148/98   Temp: 36.5 °C (97.7 °F)    Pulse: 79    Resp: 16    SpO2: 96%    Weight: 82.1 kg (181 lb)      Weights (last 180 days)     Date/Time Weight    01/04/22 0859 82.1 kg (181 lb)        ECOG Performance Status: 0    Physical Exam  Constitutional:       Appearance: Normal appearance.   HENT:      Head: Normocephalic and atraumatic.   Cardiovascular:      Rate and Rhythm: Regular rhythm.      Heart sounds: No murmur heard.    No friction rub. No gallop.   Pulmonary:      Effort: Pulmonary effort is normal.      Breath sounds: No wheezing, rhonchi or rales.   Abdominal:      Comments: Soft nontender nondistended.  Incisions are well-healed   Genitourinary:     Comments: Normal external female genitalia.  Vaginal cuff is well-healed without any visible stitches and is intact on bimanual exam.  There are no palpable masses.  Musculoskeletal:         General: Normal range of motion.      Cervical back: Normal range of motion.   Skin:     General: Skin is warm and dry.   Neurological:      General: No focal deficit present.      Mental Status: She is alert and oriented to person, place, and time.   Psychiatric:         Mood and Affect: Mood normal.         Behavior: Behavior normal.         Thought Content: Thought  content normal.         Judgment: Judgment normal.         Labs:  Lab Results   Component Value Date    WBC 12.2 (H) 11/30/2021    NEUTROABS 9.80 11/30/2021    HGB 11.5 11/30/2021     (H) 11/30/2021    K 4.1 11/30/2021    CREATININE 0.63 11/30/2021    BILITOT 0.35 06/20/2021    ALKPHOS 73 06/20/2021    AST 18 06/20/2021    ALT 12 06/20/2021    CALCIUM 8.5 (L) 11/30/2021     Lab Results   Component Value Date     32.6 11/11/2021     Imaging Reports:  11/29/2021 Path Report:  A. Fallopian tube, left, salpingectomy -      - Benign fallopian tube, negative for tumor implants or malignancy.     B. Uterus, cervix, right fallopian tube and ovary, total hysterectomy   with right salpingo-oophorectomy -      - Seromucinous borderline tumor (atypical proliferative seromucinous   tumor), 4.5 cm.      - Benign fallopian tube, negative for tumor implants or malignancy.      - AJCC 8th edition tumor stage: pT1a.      - Additional pathologic findings:      - Cervix with no significant pathologic alteration.      - Attenuated, inactive appearing endometrium with benign endometrial   polyps (x2, 0.7 to 1.5 cm).      - Adenomyosis.     C. Omentum, biopsy -      - Benign fibroadipose tissue, negative for tumor implants or   malignancy.     Assessment:  Tamiko Tiwari is a 46 y.o. with a Stage IA seromucinous borderline ovarian tumor s/p TLH, RSO, left salpingectomy on 11/29/21 who has recovered well from surgery.    Plan:  Essential hypertension  · Repeat blood pressure today was 148/98.  She reports that she has had elevated pressures at every doctors visit for the last month  · She will check her blood pressure daily after 15 minutes of rest each time for the next week and then contact Dr. Duran with this information.    Borderline epithelial neoplasm of ovary  · She was given a copy of the pathology report, and we reviewed the findings.  We discussed that a borderline tumor does have a risk of recurrence in the  contralateral ovary.  For this reason we will follow annual ultrasound along with physical exam.  · Vaginal cuff is well-healed and she can return to routine physical activity. She has been cleared to return to work next week.  · We discussed that because she does not have any history of a high-grade cervical lesion, she does not need Pap smears going forward but does need an annual pelvic exam.    All questions answered to patient's apparent satisfaction.    On this date of service 30 minutes of total time was spent on this encounter.     I would like to thank Dr Isaacs  for this consultation.     This note was generated using voice recognition software. Inadvertent word errors may have occurred, which were not recognized during proofreading process.     April Cao MD  [phone]

## 2022-01-04 NOTE — ASSESSMENT & PLAN NOTE
· Repeat blood pressure today was 148/98.  She reports that she has had elevated pressures at every doctors visit for the last month  · She will check her blood pressure daily after 15 minutes of rest each time for the next week and then contact Dr. Duran with this information.

## 2022-01-04 NOTE — LETTER
01/04/22      [facility]  [address] [address]  [phone]  Dept: [phone]        Dr. Duran,    I just saw Ms. Tiwari today for postoperative visit.  Her blood pressure has been elevated on the last several clinic visits and was in the 170s over 90s systolic.  On recheck manually it was 148/98.  She is going to check her blood pressures daily for the next week and will give your office a call as she may need adjustments to her medications.    I treated her for a seromucinous borderline tumor and will be following her for that.  She does not have an invasive cancer and will not need chemotherapy but borderline tumors can recur.    Thank you!  Mary Ellen

## 2022-01-04 NOTE — LETTER
01/04/22      [facility]  [address] [address]  [phone]  Dept: [phone]    Nina,    Thank you so much for referring Ms. Tiwari.  She had a seromucinous borderline tumor, and I left her other ovary in after counseling prior to surgery.  I am planning to get an annual ultrasound and examine her.  She lives in [place]. I can take over her gynecologic care from this point going forward.    Thank you!  Mary Ellen

## 2022-01-04 NOTE — ASSESSMENT & PLAN NOTE
· She was given a copy of the pathology report, and we reviewed the findings.  We discussed that a borderline tumor does have a risk of recurrence in the contralateral ovary.  For this reason we will follow annual ultrasound along with physical exam.  · Vaginal cuff is well-healed and she can return to routine physical activity. She has been cleared to return to work next week.  · We discussed that because she does not have any history of a high-grade cervical lesion, she does not need Pap smears going forward but does need an annual pelvic exam.

## 2022-01-10 ENCOUNTER — PATIENT MESSAGE (OUTPATIENT)
Dept: FAMILY MEDICINE | Facility: CLINIC | Age: 47
End: 2022-01-10
Payer: COMMERCIAL

## 2022-01-10 DIAGNOSIS — I10 ESSENTIAL HYPERTENSION: Primary | ICD-10-CM

## 2022-01-11 ENCOUNTER — OFFICE VISIT (OUTPATIENT)
Dept: OTOLARYNGOLOGY | Facility: CLINIC | Age: 47
End: 2022-01-11
Payer: COMMERCIAL

## 2022-01-11 VITALS
HEIGHT: 66 IN | DIASTOLIC BLOOD PRESSURE: 97 MMHG | TEMPERATURE: 98.6 F | WEIGHT: 184 LBS | BODY MASS INDEX: 29.57 KG/M2 | HEART RATE: 100 BPM | OXYGEN SATURATION: 96 % | SYSTOLIC BLOOD PRESSURE: 151 MMHG

## 2022-01-11 DIAGNOSIS — J32.4 CHRONIC PANSINUSITIS: Primary | ICD-10-CM

## 2022-01-11 PROCEDURE — 31231 NASAL ENDOSCOPY DX: CPT | Performed by: OTOLARYNGOLOGY

## 2022-01-11 RX ORDER — MUPIROCIN 20 MG/G
1 OINTMENT TOPICAL 3 TIMES DAILY
Qty: 30 G | Refills: 0 | Status: SHIPPED | OUTPATIENT
Start: 2022-01-11 | End: 2022-05-24 | Stop reason: ALTCHOICE

## 2022-01-11 ASSESSMENT — ENCOUNTER SYMPTOMS
TROUBLE SWALLOWING: 0
CHILLS: 0
FEVER: 0
SINUS PRESSURE: 0
CONSTITUTIONAL NEGATIVE: 1
RHINORRHEA: 0
SORE THROAT: 0

## 2022-01-11 NOTE — PROGRESS NOTES
Subjective    CC: f/u sinuses    HPI: Tamiko Tiwari is a 46 y.o. female who is now just short of 2-month status post endoscopic sinus surgery.  She is doing well.  She is doing much better than the last time she saw us.  She states her nasal congestion is better.  She was able to wean off of the Afrin without too much trouble.  She finished the antibiotics and steroids and Sudafed as prescribed to help her get off the Afrin.  She does still get some crusts in the nose and when they come off it there seems to be a tinge of blood.  Overall she is breathing pretty well through the nose and not having significant nasal discharge or sinus pain and pressure.    Past Medical History:   Diagnosis Date   • Allergic Pollen -    • Complex ovarian cyst    • Depression    • Endocrine disorder    • Hypertension    • Hypothyroid    • Iron deficiency 2020   • Psychiatric illness     depression   • Respiratory disease    • Severe persistent asthma with acute exacerbation 2020    Eosinophilic asthma; [place]       Past Surgical History:   Procedure Laterality Date   •  SECTION     • LAPAROSCOPIC HYSTERECTOMY N/A 2021    Procedure: LAPAROSCOPIC HYSTERECTOMY, right salpingo-oophorectomy, left salpingectomy, cystoscopy, omental biopsy.;  Surgeon: April Cao MD;  Location: [facility] OR;  Service: Gynecology;  Laterality: N/A;   • NASAL TURBINATE REDUCTION Bilateral 2021    Procedure: BILATERAL INFERIOR TURBINATE REDUCTION;  Surgeon: Ally Carmen MD;  Location: [facility];  Service: ENT;  Laterality: Bilateral;   • SINUS SURGERY Bilateral 2021    Procedure: FUNCTIONAL ENDOSCOPIC SINUS SURGERY WITH COMPUTER NAVIGATION - BILATERAL MAXILLAY and  ANTERIOR/POSTERIOR ETHMOID and SPHENOID  AND FRONTAL and BILATERAL INFERIOR TURBINATE REDUCTION (2 HR);  Surgeon: Ally Carmen MD;  Location: [facility];  Service: ENT;  Laterality: Bilateral;   • SINUS SURGERY  2021          Current Outpatient Medications:   •  levothyroxine (SYNTHROID, LEVOTHROID) 137 mcg tablet, TAKE 1 TABLET BY MOUTH DAILY, Disp: 90 tablet, Rfl: 3  •  predniSONE (DELTASONE) 20 mg tablet, Take 1 tablet PO TID x 7 days THEN 1 tablet PO BID x 3 days THEN 1 tablet PO QD x 3 days THEN 1/2 tablet PO x 3 days with food (Patient not taking: Reported on 1/4/2022 ), Disp: 32 tablet, Rfl: 0  •  triamcinolone (KENALOG) 0.1 % ointment, Apply 1 application topically 2 (two) times a day for 10 days, Disp: 30 g, Rfl: 0  •  fluticasone propionate (FLONASE) 50 mcg/actuation nasal spray, Administer 1 spray into each nostril daily, Disp: 16 g, Rfl: 5  •  Wixela Inhub 250-50 mcg/dose diskus inhaler, 1 puff 2 (two) times a day, Disp: , Rfl:   •  spironolactone (ALDACTONE) 25 mg tablet, TAKE 1 TABLET(25 MG) BY MOUTH DAILY, Disp: 90 tablet, Rfl: 1  •  sertraline (Zoloft) 100 mg tablet, Take 100 mg by mouth daily Take one tablet by mouth daily, Disp: , Rfl:   •  montelukast (SINGULAIR) 10 mg tablet, Take 1 tablet (10 mg total) by mouth nightly, Disp: 30 tablet, Rfl: 11  •  albuterol HFA (PROVENTIL HFA;VENTOLIN HFA) 90 mcg/actuation inhaler, Inhale 2 puffs every 2 (two) hours as needed for wheezing (every 2-3 hours as needed for cough/wheezing), Disp: 1 Inhaler, Rfl: 1    Allergies   Allergen Reactions   • Adhesive Tape-Silicones Rash     Drape adhesive       family history includes Alcohol abuse in her maternal grandfather and paternal grandfather; Asthma in her son; Cancer in her mother's sister; Eczema in her son; Heart failure in her maternal grandmother; Hypertension in her brother and mother; Multiple sclerosis in her father's brother; Other in her mother's brother and sister; Pacemaker/AICD in her paternal grandmother; Prostate cancer in her father; Stroke in her mother's brother; Tachycardia in her sister.    Social History     Tobacco Use   • Smoking status: Never Smoker   • Smokeless tobacco: Never Used   Vaping Use   •  "Vaping Use: Never used   Substance Use Topics   • Alcohol use: Yes     Alcohol/week: 0.0 standard drinks     Comment: occ   • Drug use: No       Review of Systems   Constitutional: Negative.  Negative for chills and fever.   HENT: Negative for congestion, ear discharge, ear pain, hearing loss, nosebleeds, postnasal drip, rhinorrhea, sinus pressure, sneezing, sore throat, tinnitus and trouble swallowing.         Crusty, bloody buildup in sinuses       Objective    /97 (BP Location: Left arm, Patient Position: Sitting, Cuff Size: Long Adult)   Pulse 100   Temp 37 °C (98.6 °F) (Temporal)   Ht 1.676 m (5' 6\")   Wt 83.5 kg (184 lb)   LMP 10/23/2021   SpO2 96%   BMI 29.70 kg/m²     General: Well-developed well-nourished female no acute distress.    Nose: Septum is midline.  Nasal tissues are much less edematous today.  There is some crusting along the head of the inferior turbinate left greater than right that has a dark brown color.  No pus or polyps seen.  Middle meatus able to be seen on anterior rhinoscopy but does appear somewhat narrow still.    Procedure: Diagnostic nasal endoscopy  The nose was anesthetized with topical lidocaine and Afrin spray.  The 30 degree rigid telescope was used to examine the nose first on the right then the left. There are no synechia from the turbinate to the lateral nasal wall on either side.  Ethmoidectomy cavity appears open on both sides.  There is a little bit of thick discharge inferiorly on the left.  No polyps.  Tissues are a little friable when touched with suction or scope.    Diagnosis    1. Chronic pansinusitis        Recommendations    Patient significantly improved from last visit.  Endoscopy today shows significant improvements but things are still little friable and there is some slightly infected looking discharge.  I am going to add xylitol to her saline rinses and also mupirocin.  Follow-up in 2 months for repeat exam.  Continue Flonase.  "

## 2022-01-14 NOTE — TELEPHONE ENCOUNTER
From: Jacinda Duran MD  To: Tamiko Tiwari  Sent: 1/10/2022 7:05 AM [facility]  Subject: blood pressure    Dr. Harini Morrison sent me a message last week about your elevated blood pressure. Do you have any recent home readings?  Dr. Duran

## 2022-01-21 DIAGNOSIS — I10 ESSENTIAL HYPERTENSION: Primary | ICD-10-CM

## 2022-01-21 RX ORDER — LOSARTAN POTASSIUM 50 MG/1
50 TABLET ORAL DAILY
Qty: 90 TABLET | Refills: 0 | Status: SHIPPED | OUTPATIENT
Start: 2022-01-21 | End: 2022-02-18 | Stop reason: SDUPTHER

## 2022-01-21 RX ORDER — SPIRONOLACTONE 50 MG/1
50 TABLET, FILM COATED ORAL DAILY
Qty: 90 TABLET | Refills: 3 | Status: SHIPPED | OUTPATIENT
Start: 2022-01-21 | End: 2023-01-30

## 2022-01-26 ENCOUNTER — APPOINTMENT (OUTPATIENT)
Dept: RADIOLOGY | Facility: HOSPITAL | Age: 47
End: 2022-01-26
Payer: COMMERCIAL

## 2022-01-26 ENCOUNTER — HOSPITAL ENCOUNTER (EMERGENCY)
Facility: HOSPITAL | Age: 47
Discharge: HOME | End: 2022-01-26
Attending: EMERGENCY MEDICINE
Payer: COMMERCIAL

## 2022-01-26 VITALS
WEIGHT: 184.08 LBS | SYSTOLIC BLOOD PRESSURE: 137 MMHG | TEMPERATURE: 97.7 F | DIASTOLIC BLOOD PRESSURE: 87 MMHG | HEART RATE: 83 BPM | BODY MASS INDEX: 29.58 KG/M2 | RESPIRATION RATE: 14 BRPM | OXYGEN SATURATION: 96 % | HEIGHT: 66 IN

## 2022-01-26 DIAGNOSIS — R07.9 CHEST PAIN WITH LOW RISK FOR CARDIAC ETIOLOGY: Primary | ICD-10-CM

## 2022-01-26 LAB
ALBUMIN SERPL-MCNC: 4 G/DL (ref 3.5–5.3)
ALP SERPL-CCNC: 107 U/L (ref 39–100)
ALT SERPL-CCNC: 12 U/L (ref 7–52)
ANION GAP SERPL CALC-SCNC: 8 MMOL/L (ref 3–11)
AST SERPL-CCNC: 15 U/L
BASOPHILS # BLD AUTO: 0.1 10*3/UL
BASOPHILS NFR BLD AUTO: 2 % (ref 0–2)
BILIRUB SERPL-MCNC: 0.35 MG/DL (ref 0.2–1.4)
BUN SERPL-MCNC: 13 MG/DL (ref 7–25)
CALCIUM ALBUM COR SERPL-MCNC: 9.1 MG/DL (ref 8.6–10.3)
CALCIUM SERPL-MCNC: 9.1 MG/DL (ref 8.6–10.3)
CHLORIDE SERPL-SCNC: 107 MMOL/L (ref 98–107)
CO2 SERPL-SCNC: 24 MMOL/L (ref 21–32)
CREAT SERPL-MCNC: 0.58 MG/DL (ref 0.6–1.1)
DELTA HIGH SENSITIVITY TROPONIN I, 1 HOUR: 0.3
EOSINOPHIL # BLD AUTO: 0.2 10*3/UL
EOSINOPHIL NFR BLD AUTO: 3 % (ref 0–3)
ERYTHROCYTE [DISTWIDTH] IN BLOOD BY AUTOMATED COUNT: 15.8 % (ref 11.5–14)
FIBRIN D-DIMER (NG/ML) IN PLATELET POOR PLASMA: 447 NG/ML FEU
GFR SERPL CREATININE-BSD FRML MDRD: 112 ML/MIN/1.73M*2
GLUCOSE SERPL-MCNC: 84 MG/DL (ref 70–105)
HCT VFR BLD AUTO: 40 % (ref 34–45)
HGB BLD-MCNC: 12.9 G/DL (ref 11.5–15.5)
HYPOCHROMIA PRESENCE IN BLOOD, ANALYZER: ABNORMAL
LYMPHOCYTES # BLD AUTO: 1.8 10*3/UL
LYMPHOCYTES NFR BLD AUTO: 27 % (ref 11–47)
MCH RBC QN AUTO: 25.4 PG (ref 28–33)
MCHC RBC AUTO-ENTMCNC: 32.1 G/DL (ref 32–36)
MCV RBC AUTO: 79.1 FL (ref 81–97)
MICROCYTOSIS PRESENCE IN BLOOD, ANALYZER: ABNORMAL
MONOCYTES # BLD AUTO: 0.6 10*3/UL
MONOCYTES NFR BLD AUTO: 10 % (ref 3–11)
NEUTROPHILS # BLD AUTO: 3.8 10*3/UL
NEUTROPHILS NFR BLD AUTO: 58 % (ref 41–81)
PLATELET # BLD AUTO: 398 10*3/UL (ref 140–350)
PMV BLD AUTO: 8.2 FL (ref 6.9–10.8)
POTASSIUM SERPL-SCNC: 3.7 MMOL/L (ref 3.5–5.1)
PROT SERPL-MCNC: 6.9 G/DL (ref 6–8.3)
RBC # BLD AUTO: 5.06 10*6/ΜL (ref 3.7–5.3)
SODIUM SERPL-SCNC: 139 MMOL/L (ref 135–145)
TROPONIN I SERPL-MCNC: 2.7 PG/ML
TROPONIN I SERPL-MCNC: 3 PG/ML
WBC # BLD AUTO: 6.5 10*3/UL (ref 4.5–10.5)

## 2022-01-26 PROCEDURE — 93005 ELECTROCARDIOGRAM TRACING: CPT | Performed by: EMERGENCY MEDICINE

## 2022-01-26 PROCEDURE — 84484 ASSAY OF TROPONIN QUANT: CPT

## 2022-01-26 PROCEDURE — 71045 X-RAY EXAM CHEST 1 VIEW: CPT

## 2022-01-26 PROCEDURE — 99284 EMERGENCY DEPT VISIT MOD MDM: CPT | Performed by: EMERGENCY MEDICINE

## 2022-01-26 PROCEDURE — 85025 COMPLETE CBC W/AUTO DIFF WBC: CPT

## 2022-01-26 PROCEDURE — 85379 FIBRIN DEGRADATION QUANT: CPT

## 2022-01-26 PROCEDURE — 36415 COLL VENOUS BLD VENIPUNCTURE: CPT

## 2022-01-26 PROCEDURE — 80053 COMPREHEN METABOLIC PANEL: CPT

## 2022-01-26 RX ORDER — SODIUM CHLORIDE 9 MG/ML
25-50 INJECTION, SOLUTION INTRAVENOUS AS NEEDED
Status: DISCONTINUED | OUTPATIENT
Start: 2022-01-26 | End: 2022-01-26 | Stop reason: HOSPADM

## 2022-01-26 ASSESSMENT — HEART SCORE
HISTORY: MODERATELY SUSPICIOUS
HEART SCORE: 3
AGE: 45-64
RISK FACTORS: 1-2 RISK FACTORS
TROPONIN: LESS THAN OR EQUAL TO NORMAL LIMIT
ECG: NORMAL

## 2022-01-26 ASSESSMENT — PAIN DESCRIPTION - DESCRIPTORS: DESCRIPTORS: NAGGING

## 2022-01-26 NOTE — ED PROVIDER NOTES
"Chest Pain (Patient c/o CP/discomfort for the last week.  Patient states the pain is worse with movement.  Patient also told by PCP she should be checked for PE as she had a hysterectomy a few months ago.  Pt states he BP meds have been recently changed)        HPI:  46-year-old female arrives with her spouse for evaluation of left-sided chest pain x1 week.  She states she has a feeling of a \"pulled muscle \"and pressure for over 1 week that is localized to left side of chest but today feels in her left upper back as well.  Rates pain 3-10, constant.  She denies any injury.  States she feels pain is worsened with any movement and with deep inspiration.  States she occasionally feels \"pounding \"in her chest as well which has improved some since her primary care provider has been adjusting her blood pressure medications.  States she sees Dr. Duran at [facility] and was referred to ER today by her PCP.  She is taking spironolactone and was recently started on losartan.  Reports occasional headaches, states she is weaning herself off coffee and headaches have improved with adjusting blood pressure medications as well.  She states past history of hysterectomy in November 2021, COVID-19 in fall 2020.  She states immunizations are up-to-date including COVID-19 vaccination, no booster.  She denies any fever, chills, cough, shortness of breath, diaphoresis, abdominal pain nausea vomiting or diarrhea.  She states occasional alcohol use, no tobacco or drugs.  She reports personal history of hypertension, no pain like this in the past, no history of heart attack or stroke.  She states she has her mother and a sibling with hypertension.       Past Medical History:   Diagnosis Date   • Allergic Pollen - 2019   • Complex ovarian cyst    • Depression    • Endocrine disorder    • Hypertension    • Hypothyroid    • Iron deficiency 9/25/2020   • Psychiatric illness     depression   • Respiratory disease    • Severe persistent " asthma with acute exacerbation 2020    Eosinophilic asthma; [place]       Past Surgical History:   Procedure Laterality Date   •  SECTION     • LAPAROSCOPIC HYSTERECTOMY N/A 2021    Procedure: LAPAROSCOPIC HYSTERECTOMY, right salpingo-oophorectomy, left salpingectomy, cystoscopy, omental biopsy.;  Surgeon: April Cao MD;  Location: [facility];  Service: Gynecology;  Laterality: N/A;   • NASAL TURBINATE REDUCTION Bilateral 2021    Procedure: BILATERAL INFERIOR TURBINATE REDUCTION;  Surgeon: Ally Carmen MD;  Location: [facility];  Service: ENT;  Laterality: Bilateral;   • SINUS SURGERY Bilateral 2021    Procedure: FUNCTIONAL ENDOSCOPIC SINUS SURGERY WITH COMPUTER NAVIGATION - BILATERAL MAXILLAY and  ANTERIOR/POSTERIOR ETHMOID and SPHENOID  AND FRONTAL and BILATERAL INFERIOR TURBINATE REDUCTION (2 HR);  Surgeon: Ally Carmen MD;  Location: [facility];  Service: ENT;  Laterality: Bilateral;   • SINUS SURGERY  2021       Social History     Socioeconomic History   • Marital status:      Spouse name: Carl   • Number of children: 1   • Years of education: Not on file   • Highest education level: Bachelor's degree (e.g., BA, AB, BS)   Occupational History   • Occupation:      Employer: [employer]   Tobacco Use   • Smoking status: Never Smoker   • Smokeless tobacco: Never Used   Vaping Use   • Vaping Use: Never used   Substance and Sexual Activity   • Alcohol use: Yes     Alcohol/week: 0.0 standard drinks     Comment: occ   • Drug use: No   • Sexual activity: Yes     Partners: Male     Birth control/protection: Condom Male   Other Topics Concern   • Not on file   Social History Narrative   • Not on file     Social Determinants of Health     Financial Resource Strain: Not on file   Food Insecurity: Not on file   Transportation Needs: Not on file   Physical Activity: Not on file   Stress: Not on file   Social Connections: Not on file    Intimate Partner Violence: Not on file   Housing Stability: Not on file       Family History   Problem Relation Age of Onset   • Hypertension Mother    • Prostate cancer Father    • Other Sister         Hysterectomy due to benign tumors    • Tachycardia Sister    • Hypertension Brother    • Heart failure Maternal Grandmother    • Alcohol abuse Maternal Grandfather    • Pacemaker/AICD Paternal Grandmother    • Alcohol abuse Paternal Grandfather    • Asthma Son    • Eczema Son    • Cancer Mother's Sister         unsure of what type   • Other Mother's Brother    • Stroke Mother's Brother    • Multiple sclerosis Father's Brother    • Ovarian cancer Neg Hx    • Pancreatic cancer Neg Hx    • Melanoma Neg Hx    • Breast cancer Neg Hx    • Uterine cancer Neg Hx        Allergies   Allergen Reactions   • Adhesive Tape-Silicones Rash     Drape adhesive         Current Outpatient Medications:   •  losartan (COZAAR) 50 mg tablet, Take 1 tablet (50 mg total) by mouth daily, Disp: 90 tablet, Rfl: 0  •  spironolactone (ALDACTONE) 50 mg tablet, Take 1 tablet (50 mg total) by mouth daily, Disp: 90 tablet, Rfl: 3  •  mupirocin (BACTROBAN) 2 % ointment, Apply 1 application topically 3 (three) times a day, Disp: 30 g, Rfl: 0  •  levothyroxine (SYNTHROID, LEVOTHROID) 137 mcg tablet, TAKE 1 TABLET BY MOUTH DAILY, Disp: 90 tablet, Rfl: 3  •  predniSONE (DELTASONE) 20 mg tablet, Take 1 tablet PO TID x 7 days THEN 1 tablet PO BID x 3 days THEN 1 tablet PO QD x 3 days THEN 1/2 tablet PO x 3 days with food (Patient not taking: Reported on 1/4/2022 ), Disp: 32 tablet, Rfl: 0  •  triamcinolone (KENALOG) 0.1 % ointment, Apply 1 application topically 2 (two) times a day for 10 days, Disp: 30 g, Rfl: 0  •  fluticasone propionate (FLONASE) 50 mcg/actuation nasal spray, Administer 1 spray into each nostril daily, Disp: 16 g, Rfl: 5  •  Wixela Inhub 250-50 mcg/dose diskus inhaler, 1 puff 2 (two) times a day, Disp: , Rfl:   •  sertraline (Zoloft) 100  mg tablet, Take 100 mg by mouth daily Take one tablet by mouth daily, Disp: , Rfl:   •  montelukast (SINGULAIR) 10 mg tablet, Take 1 tablet (10 mg total) by mouth nightly, Disp: 30 tablet, Rfl: 11  •  albuterol HFA (PROVENTIL HFA;VENTOLIN HFA) 90 mcg/actuation inhaler, Inhale 2 puffs every 2 (two) hours as needed for wheezing (every 2-3 hours as needed for cough/wheezing), Disp: 1 Inhaler, Rfl: 1      ROS:  Constitutional: Negative for fever, chills.   HENT: Negative for sore throat.    Eyes: Negative for pain.   Respiratory: Negative for cough, shortness of breath.    Cardiovascular: Positive for chest pain, palpitations.   Gastrointestinal: Negative for abdominal pain, nausea, vomiting, diarrhea.   Endocrine: Negative for polyuria.   Genitourinary: Negative for flank pain, dysuria.   Musculoskeletal: Positive for back pain.   Neurological: Positive for headaches.  Negative for lightheaded, dizziness, syncope, seizure.  Hematological: Negative for bleeding.       ED Triage Vitals   Temp Heart Rate Resp BP SpO2   01/26/22 1032 01/26/22 1032 01/26/22 1032 01/26/22 1032 01/26/22 1032   36.5 °C (97.7 °F) 93 18 162/100 97 %      Temp Source Heart Rate Source Patient Position BP Location FiO2 (%)   01/26/22 1032 -- 01/26/22 1130 -- --   Oral  Sitting           Physical Exam:  Nursing note and vitals reviewed.  Constitutional: 46-year-old female appears well-developed.  Nonill, nontoxic appearing.   HENT: Normal.   Head: Normocephalic and atraumatic.   Eyes: Pupils are equal, round, and reactive to light.   Neck: Supple, no lymphadenopathy  Cardiovascular: Regular rate and rhythm with no murmur, rub, or gallop.  Normal pulses.  Pulmonary/Chest: No respiratory distress.  Clear to auscultation bilaterally.  Minimal tenderness to palpation over left anterior chest.  Abdominal: Soft and nontender.  Bowel sounds x4.  Back: No tenderness, no CVA tenderness.  Musculoskeletal: No edema  Neurological: Alert and oriented.  No gross  weakness.  Skin: Skin is warm and dry. No rash noted.   Psychiatric: Normal mood and affect.       Labs Reviewed   CBC WITH AUTO DIFFERENTIAL - Abnormal       Result Value    WBC 6.5      RBC 5.06      Hemoglobin 12.9      Hematocrit 40.0      MCV 79.1 (*)     MCH 25.4 (*)     MCHC 32.1      RDW 15.8 (*)     Platelets 398 (*)     MPV 8.2      Neutrophils% 58      Lymphocytes% 27      Monocytes% 10      Eosinophils% 3      Basophils% 2      ANC (auto diff) 3.80      Lymphocytes Absolute 1.80      Monocytes Absolute 0.60      Eosinophils Absolute 0.20      Basophils Absolute 0.10      Microcytosis 1+      Hypochromia 1+     COMPREHENSIVE METABOLIC PANEL - Abnormal    Sodium 139      Potassium 3.7      Chloride 107      CO2 24      Anion Gap 8      BUN 13      Creatinine 0.58 (*)     Glucose 84      Calcium 9.1      AST 15      ALT (SGPT) 12      Alkaline Phosphatase 107 (*)     Total Protein 6.9      Albumin 4.0      Total Bilirubin 0.35      Corrected Calcium 9.1      eGFR 112      Narrative:     Calculation based on the 2021 Chronic Kidney Disease Epidemiology Collaboration (CKD-EPI) equation refit without adjustment for race.   D-DIMER, QUANTITATIVE - Normal    D-Dimer, Quant (ng/mL) 447      Narrative:     D-dimer values < or =500 ng/mL FEU may be used in conjunction with clinical pretest probability to exclude deep vein thrombosis (DVT) and pulmonary embolism (PE).   HIGH SENSITIVE TROPONIN I - Normal    hsTnI 0 Hour 2.7     HIGH SENSITIVE TROPONIN I, 1 HOUR - Normal    hsTnI 1 hr 3.0      Delta from 0 Hour 0.3     HIGH SENSITIVE TROPONIN I PANEL (0HR, 1HR, 2HR)    Narrative:     The following orders were created for panel order HS Troponin I Panel (0HR, 1HR, 2HR) Blood, Venous.  Procedure                               Abnormality         Status                     ---------                               -----------         ------                     HS Troponin I[[id number]]                 Normal               Final result               1HR High Sensitive Trop I[[id number]]     Normal              Final result               2HR High Sensitive Tropon...[[id number]]                                                   Please view results for these tests on the individual orders.       XR chest portable 1 view   Final Result   IMPRESSION:   No acute disease.                   MDM:    Old records reviewed and with summary showing history of hypertension, hypothyroid, depression, asthma, depression.   46-year-old female presents for 1 week history of left-sided chest pain which is worsened with deep inspiration and movement.  Patient observed on continuous telemetry and pulse oximetry monitoring.  EKG reviewed and interpreted per Dr. Hewitt.  Serial troponins within normal limits, D-dimer less than 500, patient is, no tachycardia, low suspicion at this time for CT chest pain.  CXR normal, no significant or life-threatening electrolyte abnormalities.  No evidence of acute coronary syndrome, pulmonary embolus, thoracic aortic dissection, pneumonia, pneumothorax, esophageal perforation, serious  intra-abdominal illness.     Comprehensive evaluation performed, all labs, medical records, and results updated and reviewed.    Patient advised to call her PCP office to schedule a follow-up appointment.  Discussed findings, diagnosis, and treatment plan in detail with patient who is in agreement with plan.  Patient is stable with no further indication for any testing or interventions in the emergency department at this time, discharged in stable condition with strict return precautions and follow-up instructions given.  Patient was seen in conjunction with Dr. Hewitt, attending physician, examined patient and agrees with plan of care and disposition.     Clinical Impression:  Final diagnoses:   [R07.9] Chest pain with low risk for cardiac etiology           A voice recognition program was used to aid in the documentation of this record.  Sometimes words are not printed exactly as they were spoken. While efforts were made to carefully edit and correct any inaccuracies, some errors may be present; please take these into context. Please contact the provider if errors are identified.      Estela Cabrera CNP  01/28/22 7892

## 2022-01-26 NOTE — TELEPHONE ENCOUNTER
Called patient.  She reports chest soreness for a week or so.  No exertional chest pain, worse with movement.  Trying heat now.  Patient is uncertain if she pulled muscle or not.  Soreness in shoulderblade/back started this morning.  No shortness of breath.  Pelvic surgery was November 29; no history of PE; no leg swelling.  No significant history of CAD other than grandparents having CHF/pacemaker.    Advised patient go to ER with above symptoms to have evaluation.

## 2022-01-26 NOTE — ED ATTESTATION NOTE
I personally saw and evaluated Tamiko Tiwari and discussed her management with [facility] and reviewed the [facility] note and agree with the documentation. I performed the substantive portion of the medical decision-making as documented by the [facility].    MD Shashank Mcrae MD  01/26/22 0064

## 2022-01-26 NOTE — ED PROCEDURE NOTE
Procedure  ECG 12 lead    Date/Time: 1/26/2022 12:47 PM  Performed by: Shashank Hewitt MD  Authorized by: Joseph Narvaez MD     ECG reviewed by ED Physician in the absence of a cardiologist: yes    Rate:     ECG rate:  89  Rhythm:     Rhythm: sinus rhythm    QRS:     QRS axis:  Normal  Conduction:     Conduction: normal    ST segments:     ST segments:  Normal  T waves:     T waves: normal    Other findings:     Other findings comment:  Impression: Normal twelve-lead EKG                 Shashank Hewitt MD  01/26/22 1242

## 2022-01-26 NOTE — DISCHARGE INSTRUCTIONS
You have been evaluated today for chest pain.    Your EKG is normal.  Your cardiac lab troponin is normal.  Your D-dimer which is the lab value checking for blood clot in your lungs is less than 500.    At this time we do not feel your pain is related to a blood clot in your lung or a heart attack.    Call Dr. Ledesma's office to schedule a follow-up appointment.    If you develop shortness of breath, difficulty breathing, worsening chest pain or any new or worsening symptoms or concerns please return to ER immediately.

## 2022-02-18 ENCOUNTER — APPOINTMENT (OUTPATIENT)
Dept: LAB | Facility: CLINIC | Age: 47
End: 2022-02-18
Payer: COMMERCIAL

## 2022-02-18 ENCOUNTER — OFFICE VISIT (OUTPATIENT)
Dept: FAMILY MEDICINE | Facility: CLINIC | Age: 47
End: 2022-02-18
Payer: COMMERCIAL

## 2022-02-18 VITALS
HEIGHT: 66 IN | DIASTOLIC BLOOD PRESSURE: 82 MMHG | TEMPERATURE: 97.3 F | SYSTOLIC BLOOD PRESSURE: 136 MMHG | BODY MASS INDEX: 28.61 KG/M2 | OXYGEN SATURATION: 96 % | HEART RATE: 103 BPM | WEIGHT: 178 LBS

## 2022-02-18 DIAGNOSIS — I10 ESSENTIAL HYPERTENSION: Primary | ICD-10-CM

## 2022-02-18 DIAGNOSIS — E61.1 IRON DEFICIENCY: ICD-10-CM

## 2022-02-18 DIAGNOSIS — E55.9 VITAMIN D DEFICIENCY: ICD-10-CM

## 2022-02-18 PROBLEM — M25.512 LEFT SHOULDER PAIN: Status: RESOLVED | Noted: 2018-05-14 | Resolved: 2022-02-18

## 2022-02-18 LAB
25(OH)D3 SERPL-MCNC: 15 NG/ML (ref 30–100)
ANION GAP SERPL CALC-SCNC: 6 MMOL/L (ref 3–11)
BUN SERPL-MCNC: 16 MG/DL (ref 7–25)
CALCIUM SERPL-MCNC: 9.5 MG/DL (ref 8.6–10.3)
CHLORIDE SERPL-SCNC: 103 MMOL/L (ref 98–107)
CO2 SERPL-SCNC: 30 MMOL/L (ref 21–32)
CREAT SERPL-MCNC: 0.62 MG/DL (ref 0.6–1.1)
FERRITIN SERPL-MCNC: 29 NG/ML (ref 11–307)
GFR SERPL CREATININE-BSD FRML MDRD: 110 ML/MIN/1.73M*2
GLUCOSE SERPL-MCNC: 88 MG/DL (ref 70–105)
IRON SATN MFR SERPL: 12 % (ref 13–50)
IRON SERPL-MCNC: 41 UG/DL (ref 50–175)
POTASSIUM SERPL-SCNC: 4.5 MMOL/L (ref 3.5–5.1)
SODIUM SERPL-SCNC: 139 MMOL/L (ref 135–145)
TIBC SERPL-MCNC: 356 UG/DL (ref 250–450)
UIBC SERPL-MCNC: 315 UG/DL (ref 155–355)

## 2022-02-18 PROCEDURE — 82306 VITAMIN D 25 HYDROXY: CPT | Performed by: FAMILY MEDICINE

## 2022-02-18 PROCEDURE — 83550 IRON BINDING TEST: CPT | Performed by: FAMILY MEDICINE

## 2022-02-18 PROCEDURE — 83540 ASSAY OF IRON: CPT | Performed by: FAMILY MEDICINE

## 2022-02-18 PROCEDURE — 82728 ASSAY OF FERRITIN: CPT | Performed by: FAMILY MEDICINE

## 2022-02-18 PROCEDURE — 36415 COLL VENOUS BLD VENIPUNCTURE: CPT | Performed by: FAMILY MEDICINE

## 2022-02-18 PROCEDURE — 99213 OFFICE O/P EST LOW 20 MIN: CPT | Performed by: FAMILY MEDICINE

## 2022-02-18 PROCEDURE — 80048 BASIC METABOLIC PNL TOTAL CA: CPT | Performed by: FAMILY MEDICINE

## 2022-02-18 RX ORDER — LOSARTAN POTASSIUM 50 MG/1
50 TABLET ORAL DAILY
Qty: 90 TABLET | Refills: 3 | Status: SHIPPED | OUTPATIENT
Start: 2022-02-18 | End: 2022-04-21

## 2022-02-19 NOTE — ASSESSMENT & PLAN NOTE
She is currently taking oral iron and is tolerating it well.  She is due for an iron level today.  This shows her ferritin to still be a bit low at 29 and her serum iron to be 41.  She will continue with her oral iron therapy for at least 3 months and then may taper off if desired.  Goal ferritin is greater than 40.

## 2022-02-19 NOTE — PROGRESS NOTES
"SUBJECTIVE:    Chief Complaint   Patient presents with   • Med Management   • Follow-up         Tamiko Tiwari is a 46 y.o. old female who presents for follow up of the following problems:      Iron deficiency  She is currently taking oral iron and is tolerating it well.  She is due for an iron level today.  This shows her ferritin to still be a bit low at 29 and her serum iron to be 41.  She will continue with her oral iron therapy for at least 3 months and then may taper off if desired.  Goal ferritin is greater than 40.    Vitamin D deficiency  This is a new problem identified at today.  The patient's vitamin D level is quite low at 15.  She is not currently taking any vitamin D supplement.  I have recommended 5000 IU daily and recheck a level in 3 months with lab only.    Essential hypertension  The patient was started on blood pressure medications about a month ago due to persistently elevated blood pressure.  Her current medication is losartan 50 mg daily.  She is also taking spironolactone 50 mg daily.  Blood pressure has been at goal range and she reports no side effects from the medication.  Follow-up BMP today is normal.  Continue current medication and routine home monitoring.         The patient's past medical history, medications,  allergies and social history were reviewed in her electronic medical record at today's visit.    Review of Systems -   Negative for: Shortness of breath, chest pain, bowel or bladder issues.  The head pressure that she was experiencing with her elevated blood pressure has dissipated.    OBJECTIVE:  /82 (BP Location: Right arm, Patient Position: Sitting, Cuff Size: Regular Adult)   Pulse 103   Temp 36.3 °C (97.3 °F) (Temporal)   Ht 1.676 m (5' 6\")   Wt 80.7 kg (178 lb)   LMP 10/23/2021   SpO2 96%   BMI 28.73 kg/m²   General appearance: alert, well appearing, and in no distress.  Neck exam - supple, no significant adenopathy.  Thyroid- palpates normal, no " nodules.  Chest: clear to auscultation, no wheezes, rales or rhonchi, symmetric air entry.   CVS exam: normal rate, regular rhythm, carotids without bruits.  Abdominal exam: soft, non-tender, no palpable or pulsatile masses, no hepatosplenomegaly.      LABS:  Recent Results (from the past 48 hour(s))   Basic metabolic panel Blood, Venous    Collection Time: 02/18/22  8:23 AM   Result Value Ref Range    Sodium 139 135 - 145 mmol/L    Potassium 4.5 3.5 - 5.1 MMOL/L    Chloride 103 98 - 107 mmol/L    CO2 30 21 - 32 mmol/L    BUN 16 7 - 25 mg/dL    Creatinine 0.62 0.60 - 1.10 mg/dL    Glucose 88 70 - 105 mg/dL    Calcium 9.5 8.6 - 10.3 mg/dL    Anion Gap 6 3 - 11 mmol/L    eGFR 110 >60 mL/min/1.73m*2   Vitamin D 25 hydroxy Blood, Venous    Collection Time: 02/18/22  8:23 AM   Result Value Ref Range    Vit D, 25-Hydroxy 15 (L) 30 - 100 ng/mL   Iron and TIBC Blood, Venous    Collection Time: 02/18/22  8:23 AM   Result Value Ref Range    Iron 41 (L) 50 - 175 ug/dL    TIBC 356 250 - 450 ug/dL    Iron Saturation 12 (L) 13 - 50 %    UIBC 315 155 - 355 ug/dL   Ferritin Blood, Venous    Collection Time: 02/18/22  8:23 AM   Result Value Ref Range    Ferritin 29.0 11.0 - 307.0 ng/mL         DIAGNOSIS   Diagnosis Plan   1. Essential hypertension  Basic metabolic panel Blood, Venous    losartan (COZAAR) 50 mg tablet   2. Iron deficiency  Iron panel Blood, Venous   3. Vitamin D deficiency  Vitamin D 25 hydroxy Blood, Venous         PLAN:  See above for problem specific plan.      Jacinda Duran MD

## 2022-02-19 NOTE — ASSESSMENT & PLAN NOTE
The patient was started on blood pressure medications about a month ago due to persistently elevated blood pressure.  Her current medication is losartan 50 mg daily.  She is also taking spironolactone 50 mg daily.  Blood pressure has been at goal range and she reports no side effects from the medication.  Follow-up BMP today is normal.  Continue current medication and routine home monitoring.

## 2022-02-19 NOTE — ASSESSMENT & PLAN NOTE
This is a new problem identified at today.  The patient's vitamin D level is quite low at 15.  She is not currently taking any vitamin D supplement.  I have recommended 5000 IU daily and recheck a level in 3 months with lab only.

## 2022-03-05 DIAGNOSIS — G47.00 INSOMNIA, UNSPECIFIED TYPE: Primary | ICD-10-CM

## 2022-03-05 NOTE — TELEPHONE ENCOUNTER
Medication refill request:  Medication(s):  zoloft not filled due to (route to Nurse Pool) Historical Provider, please review for refill

## 2022-03-05 NOTE — TELEPHONE ENCOUNTER
No new care gaps identified.  Powered by [employer]. Reference number: [id number]. 3/05/2022 8:53:14 AM MST

## 2022-03-07 RX ORDER — SERTRALINE HYDROCHLORIDE 100 MG/1
TABLET, FILM COATED ORAL
Qty: 90 TABLET | Refills: 1 | Status: SHIPPED | OUTPATIENT
Start: 2022-03-07 | End: 2022-09-17

## 2022-03-08 ENCOUNTER — OFFICE VISIT (OUTPATIENT)
Dept: OTOLARYNGOLOGY | Facility: CLINIC | Age: 47
End: 2022-03-08
Payer: COMMERCIAL

## 2022-03-08 ENCOUNTER — LAB (OUTPATIENT)
Dept: LAB | Facility: CLINIC | Age: 47
End: 2022-03-08
Payer: COMMERCIAL

## 2022-03-08 VITALS
WEIGHT: 181 LBS | BODY MASS INDEX: 29.09 KG/M2 | HEART RATE: 81 BPM | DIASTOLIC BLOOD PRESSURE: 90 MMHG | OXYGEN SATURATION: 96 % | HEIGHT: 66 IN | TEMPERATURE: 97.9 F | SYSTOLIC BLOOD PRESSURE: 142 MMHG

## 2022-03-08 DIAGNOSIS — J32.2 CHRONIC ETHMOIDAL SINUSITIS: ICD-10-CM

## 2022-03-08 DIAGNOSIS — J32.2 CHRONIC ETHMOIDAL SINUSITIS: Primary | ICD-10-CM

## 2022-03-08 PROCEDURE — 99213 OFFICE O/P EST LOW 20 MIN: CPT | Mod: 25 | Performed by: OTOLARYNGOLOGY

## 2022-03-08 PROCEDURE — 36415 COLL VENOUS BLD VENIPUNCTURE: CPT | Performed by: OTOLARYNGOLOGY

## 2022-03-08 PROCEDURE — 86003 ALLG SPEC IGE CRUDE XTRC EA: CPT | Performed by: OTOLARYNGOLOGY

## 2022-03-08 PROCEDURE — 31231 NASAL ENDOSCOPY DX: CPT | Performed by: OTOLARYNGOLOGY

## 2022-03-08 RX ORDER — PREDNISONE 50 MG/1
TABLET ORAL
Qty: 5 TABLET | Refills: 0 | Status: SHIPPED | OUTPATIENT
Start: 2022-03-08 | End: 2022-05-24 | Stop reason: ALTCHOICE

## 2022-03-08 RX ORDER — EPINEPHRINE 0.3 MG/.3ML
0.3 INJECTION SUBCUTANEOUS AS NEEDED
Qty: 1 EACH | Refills: 1 | Status: SHIPPED | OUTPATIENT
Start: 2022-03-08 | End: 2022-07-12 | Stop reason: ALTCHOICE

## 2022-03-08 ASSESSMENT — ENCOUNTER SYMPTOMS
CHILLS: 0
TROUBLE SWALLOWING: 0
SINUS PRESSURE: 0
RHINORRHEA: 1
FEVER: 0
CONSTITUTIONAL NEGATIVE: 1
SORE THROAT: 0

## 2022-03-08 NOTE — PROGRESS NOTES
Subjective    CC: f/u sinusitis    HPI: Tamiko Tiwari is a 47 y.o. female who is s/p septo/turbs and FESS in . She was doing well after the last visit on the mupirocin and xylitol sinus rinses. She had a URI 3 weeks ago that included sore throat, sinus pressure and d/c and nasal congestion. The nasal congestion is persisting somewhat. Prior to that URI 3 weeks ago she feels like her nose and sinuses were doing very well.    Past Medical History:   Diagnosis Date   • Allergic Pollen -    • Complex ovarian cyst    • Depression    • Endocrine disorder    • Hypertension    • Hypothyroid    • Iron deficiency 2020   • Psychiatric illness     depression   • Respiratory disease    • Severe persistent asthma with acute exacerbation 2020    Eosinophilic asthma; [place]       Past Surgical History:   Procedure Laterality Date   •  SECTION     • LAPAROSCOPIC HYSTERECTOMY N/A 2021    Procedure: LAPAROSCOPIC HYSTERECTOMY, right salpingo-oophorectomy, left salpingectomy, cystoscopy, omental biopsy.;  Surgeon: April Cao MD;  Location: [facility] OR;  Service: Gynecology;  Laterality: N/A;   • NASAL TURBINATE REDUCTION Bilateral 2021    Procedure: BILATERAL INFERIOR TURBINATE REDUCTION;  Surgeon: Ally Carmen MD;  Location: [facility] SURGICAL SERVICES;  Service: ENT;  Laterality: Bilateral;   • SINUS SURGERY Bilateral 2021    Procedure: FUNCTIONAL ENDOSCOPIC SINUS SURGERY WITH COMPUTER NAVIGATION - BILATERAL MAXILLAY and  ANTERIOR/POSTERIOR ETHMOID and SPHENOID  AND FRONTAL and BILATERAL INFERIOR TURBINATE REDUCTION (2 HR);  Surgeon: Ally Carmen MD;  Location: [facility] SURGICAL SERVICES;  Service: ENT;  Laterality: Bilateral;   • SINUS SURGERY  2021         Current Outpatient Medications:   •  predniSONE 50 mg tablet, Take 1 tablet PO once daily with food for 5 days, Disp: 5 tablet, Rfl: 0  •  sertraline (ZOLOFT) 100 mg tablet, TAKE 1 TABLET(100 MG) BY MOUTH DAILY, Disp: 90  tablet, Rfl: 1  •  cholecalciferol, vitamin D3, 125 mcg (5,000 unit) tablet, Take 1 tablet (5,000 Units total) by mouth daily, Disp: , Rfl:   •  losartan (COZAAR) 50 mg tablet, Take 1 tablet (50 mg total) by mouth daily, Disp: 90 tablet, Rfl: 3  •  spironolactone (ALDACTONE) 50 mg tablet, Take 1 tablet (50 mg total) by mouth daily, Disp: 90 tablet, Rfl: 3  •  mupirocin (BACTROBAN) 2 % ointment, Apply 1 application topically 3 (three) times a day, Disp: 30 g, Rfl: 0  •  levothyroxine (SYNTHROID, LEVOTHROID) 137 mcg tablet, TAKE 1 TABLET BY MOUTH DAILY, Disp: 90 tablet, Rfl: 3  •  fluticasone propionate (FLONASE) 50 mcg/actuation nasal spray, Administer 1 spray into each nostril daily, Disp: 16 g, Rfl: 5  •  Wixela Inhub 250-50 mcg/dose diskus inhaler, 1 puff 2 (two) times a day, Disp: , Rfl:   •  montelukast (SINGULAIR) 10 mg tablet, Take 1 tablet (10 mg total) by mouth nightly, Disp: 30 tablet, Rfl: 11  •  albuterol HFA (PROVENTIL HFA;VENTOLIN HFA) 90 mcg/actuation inhaler, Inhale 2 puffs every 2 (two) hours as needed for wheezing (every 2-3 hours as needed for cough/wheezing), Disp: 1 Inhaler, Rfl: 1    Allergies   Allergen Reactions   • Adhesive Tape-Silicones Rash     Drape adhesive       family history includes Alcohol abuse in her maternal grandfather and paternal grandfather; Asthma in her son; Cancer in her mother's sister; Eczema in her son; Heart failure in her maternal grandmother; Hypertension in her brother and mother; Multiple sclerosis in her father's brother; Other in her mother's brother and sister; Pacemaker/AICD in her paternal grandmother; Prostate cancer in her father; Stroke in her mother's brother; Tachycardia in her sister.    Social History     Tobacco Use   • Smoking status: Never Smoker   • Smokeless tobacco: Never Used   Vaping Use   • Vaping Use: Never used   Substance Use Topics   • Alcohol use: Yes     Alcohol/week: 0.0 standard drinks     Comment: occ   • Drug use: No       Review of  "Systems   Constitutional: Negative.  Negative for chills and fever.   HENT: Positive for congestion, postnasal drip and rhinorrhea. Negative for ear discharge, ear pain, hearing loss, nosebleeds, sinus pressure, sneezing, sore throat, tinnitus and trouble swallowing.        Objective    /90 (BP Location: Left arm, Patient Position: Sitting, Cuff Size: Long Adult)   Pulse 81   Temp 36.6 °C (97.9 °F) (Temporal)   Ht 1.676 m (5' 6\")   Wt 82.1 kg (181 lb)   LMP 10/23/2021   SpO2 96%   BMI 29.21 kg/m²     PHYSICAL EXAMINATION:      GENERAL:  Well-developed, well-nourished.  The patient is alert, oriented and in no acute distress.        PSYCH: Normal mood and affect.        SKIN: No evidence of significant rash or concerning skin lesion on the head or neck.      HEAD/FACE:  Normally formed without evidence of mass or trauma.  The sinuses are nontender.        EARS: Bilateral external ears are normal.  Bilateral external auditory canals are normal.  Bilateral tympanic membranes intact and normal.      NOSE:  The external nose appears normal.  The septum is mostly midline.  The turbinates appear normal.  Nasal tissues appear less friable and edematous than at the previous visit.      ORAL CAVITY/OROPHARYNX:  There are no focal masses or lesions.  There are normal mucous membranes.  The tonsils appear normal without mass or focal lesion.  Oropharynx mucosa appears normal.      NECK:  There is no palpable adenopathy, goiter or mass.  The trachea is midline.      Procedure: Diagnostic nasal endoscopy.  To further evaluate the patient's condition diagnostic nasal endoscopy was performed.  Anesthesia was with topical lidocaine and Afrin spray.  Nasal telescope was inserted through the nostril and into the right nasal cavity first and into the left.  The nasal cavity was examined bilaterally including the inferior turbinate, middle turbinate, superior turbinate, middle meatus and sphenoethmoid recess.  Pertinent " findings include Edema centered around the middle meatus right worse than left.  No pus.  No polyps.          Diagnosis    1. Chronic ethmoidal sinusitis        Recommendations    1.  Patient with some lingering stuffiness after recent URI as well as some edema around the middle meatus on both sides.  No evidence of infection at this point.  Her [employer] [facility] ImmunoCap panel was positive.  She is not doing as well as I would like with regards to nasal congestion though status post septoplasty and Fess in November.  I recommend a short burst of prednisone 50 mg for 5 days as well as doing some food allergy testing since she stated that it beer made her very stuffy the other day.  She is also going to get scheduled with Pamella for skin prick testing.

## 2022-03-09 LAB
BARLEY IGE QN: <0.35 KU/L
CORN IGE QN: <0.35 KU/L
GLUTEN IGE QN: <0.35 KU/L
MILK IGE QN: <0.35 KU/L
SOYBEAN IGE QN: <0.35 KU/L
WHEAT IGE QN: <0.35 KU/L

## 2022-03-18 ENCOUNTER — OFFICE VISIT (OUTPATIENT)
Dept: OTOLARYNGOLOGY | Facility: CLINIC | Age: 47
End: 2022-03-18
Payer: COMMERCIAL

## 2022-03-18 VITALS
HEART RATE: 100 BPM | WEIGHT: 181 LBS | HEIGHT: 66 IN | SYSTOLIC BLOOD PRESSURE: 125 MMHG | OXYGEN SATURATION: 96 % | DIASTOLIC BLOOD PRESSURE: 80 MMHG | BODY MASS INDEX: 29.09 KG/M2 | TEMPERATURE: 98.6 F

## 2022-03-18 DIAGNOSIS — R09.81 NASAL CONGESTION: Primary | ICD-10-CM

## 2022-03-18 PROCEDURE — 95004 PERQ TESTS W/ALRGNC XTRCS: CPT | Performed by: NURSE PRACTITIONER

## 2022-03-18 NOTE — PROGRESS NOTES
Subjective   CC: skin prick testing.     Patient ID: Tamiko Tiwari is a 47 y.o. female.    HPI  Pt is here for skin prick testing. Pt had FESS and nasal turbinate reduction done in November of 2021.     She had a URI about a month ago and her sinus pressure and congestion has persisted. She saw Dr. Carmen on 03/08/2022 and he did a nasal endoscopy which showed some edema around the middle meatus that was worse on the right then the left.     She did have a negative western blood allergy test in 2020 and she had negative food allergy testing last week.     Review of Systems see [facility]    Allergies   Allergen Reactions   • Adhesive Tape-Silicones Rash     Drape adhesive     Current Outpatient Medications   Medication Sig Dispense Refill   • EPINEPHrine (EPIPEN) 0.3 mg/0.3 mL injection syringe Inject 0.3 mL (0.3 mg total) into the shoulder, thigh, or buttocks as needed for anaphylaxis Call 911 after use. 1 each 1   • sertraline (ZOLOFT) 100 mg tablet TAKE 1 TABLET(100 MG) BY MOUTH DAILY 90 tablet 1   • cholecalciferol, vitamin D3, 125 mcg (5,000 unit) tablet Take 1 tablet (5,000 Units total) by mouth daily     • losartan (COZAAR) 50 mg tablet Take 1 tablet (50 mg total) by mouth daily 90 tablet 3   • spironolactone (ALDACTONE) 50 mg tablet Take 1 tablet (50 mg total) by mouth daily 90 tablet 3   • levothyroxine (SYNTHROID, LEVOTHROID) 137 mcg tablet TAKE 1 TABLET BY MOUTH DAILY 90 tablet 3   • fluticasone propionate (FLONASE) 50 mcg/actuation nasal spray Administer 1 spray into each nostril daily 16 g 5   • Wixela Inhub 250-50 mcg/dose diskus inhaler 1 puff 2 (two) times a day     • montelukast (SINGULAIR) 10 mg tablet Take 1 tablet (10 mg total) by mouth nightly 30 tablet 11   • albuterol HFA (PROVENTIL HFA;VENTOLIN HFA) 90 mcg/actuation inhaler Inhale 2 puffs every 2 (two) hours as needed for wheezing (every 2-3 hours as needed for cough/wheezing) 1 Inhaler 1   • predniSONE 50 mg tablet Take 1 tablet PO once daily  "with food for 5 days (Patient not taking: Reported on 3/18/2022) 5 tablet 0   • mupirocin (BACTROBAN) 2 % ointment Apply 1 application topically 3 (three) times a day (Patient not taking: Reported on 3/18/2022) 30 g 0     No current facility-administered medications for this visit.     Past Medical History:   Diagnosis Date   • Allergic Pollen -    • Complex ovarian cyst    • Depression    • Endocrine disorder    • Hypertension    • Hypothyroid    • Iron deficiency 2020   • Psychiatric illness     depression   • Respiratory disease    • Severe persistent asthma with acute exacerbation 2020    Eosinophilic asthma; [place]     Past Surgical History:   Procedure Laterality Date   •  SECTION     • LAPAROSCOPIC HYSTERECTOMY N/A 2021    Procedure: LAPAROSCOPIC HYSTERECTOMY, right salpingo-oophorectomy, left salpingectomy, cystoscopy, omental biopsy.;  Surgeon: April Cao MD;  Location: [facility] OR;  Service: Gynecology;  Laterality: N/A;   • NASAL TURBINATE REDUCTION Bilateral 2021    Procedure: BILATERAL INFERIOR TURBINATE REDUCTION;  Surgeon: Ally Carmen MD;  Location: [facility];  Service: ENT;  Laterality: Bilateral;   • SINUS SURGERY Bilateral 2021    Procedure: FUNCTIONAL ENDOSCOPIC SINUS SURGERY WITH COMPUTER NAVIGATION - BILATERAL MAXILLAY and  ANTERIOR/POSTERIOR ETHMOID and SPHENOID  AND FRONTAL and BILATERAL INFERIOR TURBINATE REDUCTION (2 HR);  Surgeon: Ally Carmen MD;  Location: [facility] SERVICES;  Service: ENT;  Laterality: Bilateral;   • SINUS SURGERY  2021       Objective   /80 (BP Location: Left arm, Patient Position: Sitting, Cuff Size: Long Adult)   Pulse 100   Temp 37 °C (98.6 °F) (Temporal)   Ht 1.676 m (5' 6\")   Wt 82.1 kg (181 lb)   LMP 10/23/2021   SpO2 96%   BMI 29.21 kg/m²     Physical Exam    PHYSICAL EXAMINATION:  GENERAL:  Well-developed, well-nourished.  The patient is alert, oriented and in no acute distress.  "   PSYCH: Normal mood and affect.   SKIN: No evidence of significant rash or concerning skin lesion on the head or neck.  HEAD/FACE:  Normally formed without evidence of mass or trauma.  The sinuses are nontender.    EARS: Bilateral external ears are normal.  Bilateral external auditory canals are normal.  Bilateral tympanic membranes intact and normal.  NOSE:  The external nose appears normal.  The septum is mostly midline.  The turbinates appear normal.    ORAL CAVITY/OROPHARYNX:  There are no focal masses or lesions.  There are normal mucous membranes.  The tonsils appear normal without mass or focal lesion.  Oropharynx mucosa appears normal.  NECK:  There is no palpable adenopathy, goiter or mass.  The trachea is midline.  SKIN: Skin is pink, warm, and dry with out evidence of rash.      Diagnosis Plan   1. Nasal congestion         Informed consent was obtained. Discussed risks and benefits of skin prick testing to include: local skin irritation and anaphylaxis with benefit of improving allergy symptoms. Pt verbalized understanding and would like to proceed with skin prick testing. A total of 50 skin pricks were performed. Pt positive and negative control tests had appropriate responses. Pt has positive response to pine and sagebrush. See Allergy Skin Test recording form. Discussed use of ice packs, hydrocortisone cream, and benadryl after allergy testing. Encouraged pt to avoid strenuous activity for the rest of the day. Discussed with patient the seasonality of her symptoms and I don't feel like this is contributing to her symptoms currently. I did discuss with her that this does not rule out nasal irritants. Will discuss results with Dr. Carmen and see if he has any further recommendations for her symptoms. She has no further questions at this time.   FELIX SKELTON, CNP

## 2022-03-30 DIAGNOSIS — G47.00 INSOMNIA, UNSPECIFIED TYPE: ICD-10-CM

## 2022-03-30 NOTE — TELEPHONE ENCOUNTER
No new care gaps identified.  Powered by [employer]. Reference number: [id number]. 3/30/2022 8:21:42 AM MIKO

## 2022-03-31 RX ORDER — SERTRALINE HYDROCHLORIDE 100 MG/1
TABLET, FILM COATED ORAL
Qty: 90 TABLET | Refills: 1 | OUTPATIENT
Start: 2022-03-31

## 2022-04-20 DIAGNOSIS — I10 ESSENTIAL HYPERTENSION: ICD-10-CM

## 2022-04-20 NOTE — TELEPHONE ENCOUNTER
No new care gaps identified.  Powered by [employer]. Reference number: [id number]. 4/20/2022 4:59:06 AM MIKO

## 2022-04-21 RX ORDER — LOSARTAN POTASSIUM 50 MG/1
TABLET ORAL
Qty: 90 TABLET | Refills: 3 | Status: SHIPPED | OUTPATIENT
Start: 2022-04-21 | End: 2023-03-28 | Stop reason: SDUPTHER

## 2022-05-20 DIAGNOSIS — I10 ESSENTIAL HYPERTENSION: Chronic | ICD-10-CM

## 2022-05-20 RX ORDER — SPIRONOLACTONE 25 MG/1
TABLET ORAL
Qty: 90 TABLET | Refills: 1 | OUTPATIENT
Start: 2022-05-20

## 2022-05-20 NOTE — TELEPHONE ENCOUNTER
No new care gaps identified.  [facility] Embedded Care Gaps. Reference number: [id number]. 5/20/2022 4:58:30 AM MIKO

## 2022-05-20 NOTE — TELEPHONE ENCOUNTER
Medication refill request:  Medication(s):  Spironolactone 25 mg not filled due to (route to Nurse Pool) Medication Discontinued at this strength

## 2022-05-23 ENCOUNTER — TELEPHONE (OUTPATIENT)
Dept: OTOLARYNGOLOGY | Facility: CLINIC | Age: 47
End: 2022-05-23
Payer: COMMERCIAL

## 2022-05-24 ENCOUNTER — OFFICE VISIT (OUTPATIENT)
Dept: OTOLARYNGOLOGY | Facility: CLINIC | Age: 47
End: 2022-05-24
Payer: COMMERCIAL

## 2022-05-24 ENCOUNTER — APPOINTMENT (OUTPATIENT)
Dept: LAB | Facility: CLINIC | Age: 47
End: 2022-05-24
Payer: COMMERCIAL

## 2022-05-24 VITALS
RESPIRATION RATE: 16 BRPM | BODY MASS INDEX: 28.73 KG/M2 | OXYGEN SATURATION: 94 % | WEIGHT: 178 LBS | TEMPERATURE: 98.5 F | HEART RATE: 78 BPM

## 2022-05-24 DIAGNOSIS — E55.9 VITAMIN D DEFICIENCY: ICD-10-CM

## 2022-05-24 DIAGNOSIS — J33.9 NASAL POLYP: ICD-10-CM

## 2022-05-24 DIAGNOSIS — J32.4 CHRONIC PANSINUSITIS: Primary | ICD-10-CM

## 2022-05-24 DIAGNOSIS — J45.50 SEVERE PERSISTENT ASTHMA WITHOUT COMPLICATION: ICD-10-CM

## 2022-05-24 PROCEDURE — 87077 CULTURE AEROBIC IDENTIFY: CPT | Performed by: OTOLARYNGOLOGY

## 2022-05-24 PROCEDURE — 82306 VITAMIN D 25 HYDROXY: CPT | Performed by: FAMILY MEDICINE

## 2022-05-24 PROCEDURE — 31231 NASAL ENDOSCOPY DX: CPT | Performed by: OTOLARYNGOLOGY

## 2022-05-24 PROCEDURE — 87186 SC STD MICRODIL/AGAR DIL: CPT | Performed by: OTOLARYNGOLOGY

## 2022-05-24 PROCEDURE — 99213 OFFICE O/P EST LOW 20 MIN: CPT | Mod: 25 | Performed by: OTOLARYNGOLOGY

## 2022-05-24 PROCEDURE — 36415 COLL VENOUS BLD VENIPUNCTURE: CPT | Performed by: FAMILY MEDICINE

## 2022-05-24 PROCEDURE — 87070 CULTURE OTHR SPECIMN AEROBIC: CPT | Performed by: OTOLARYNGOLOGY

## 2022-05-24 RX ORDER — PREDNISONE 20 MG/1
TABLET ORAL
Qty: 32 TABLET | Refills: 0 | Status: SHIPPED | OUTPATIENT
Start: 2022-05-24 | End: 2022-07-12 | Stop reason: ALTCHOICE

## 2022-05-24 ASSESSMENT — ENCOUNTER SYMPTOMS
SORE THROAT: 0
TROUBLE SWALLOWING: 0
RHINORRHEA: 0
SINUS PRESSURE: 1
FEVER: 0
CHILLS: 0

## 2022-05-24 ASSESSMENT — PAIN SCALES - GENERAL: PAINLEVEL: 1

## 2022-05-24 NOTE — PROGRESS NOTES
Subjective    CC: Sinus problems    HPI: Tamiko Tiwari is a 47 y.o. female who is status post endoscopic sinus surgery in 2021.  She did well initially but starting in February or March she began to have stuffiness in the nose and some drainage.  I gave her some prednisone for 5 days and that seemed to help a little.  She had skin prick testing which was positive to pine and sagebrush after her [facility] ImmunoCap panel have been negative.  She states that for the last 3 or 4 weeks again things have been pretty stuffy in the nose.  She has some pressure in the sinuses and in the left ear.  She is doing Flonase daily and sinus rinse but she has stopped the rinses in the last couple weeks.  She does not have a lot of discolored discharge but occasionally has some yellow drainage.  Past Medical History:   Diagnosis Date   • Allergic Pollen -    • Complex ovarian cyst    • Depression    • Endocrine disorder    • Hypertension    • Hypothyroid    • Iron deficiency 2020   • Psychiatric illness     depression   • Respiratory disease    • Severe persistent asthma with acute exacerbation 2020    Eosinophilic asthma; [place]       Past Surgical History:   Procedure Laterality Date   •  SECTION     • LAPAROSCOPIC HYSTERECTOMY N/A 2021    Procedure: LAPAROSCOPIC HYSTERECTOMY, right salpingo-oophorectomy, left salpingectomy, cystoscopy, omental biopsy.;  Surgeon: April Cao MD;  Location: [facility] OR;  Service: Gynecology;  Laterality: N/A;   • NASAL TURBINATE REDUCTION Bilateral 2021    Procedure: BILATERAL INFERIOR TURBINATE REDUCTION;  Surgeon: Ally Carmen MD;  Location: [facility];  Service: ENT;  Laterality: Bilateral;   • SINUS SURGERY Bilateral 2021    Procedure: FUNCTIONAL ENDOSCOPIC SINUS SURGERY WITH COMPUTER NAVIGATION - BILATERAL MAXILLAY and  ANTERIOR/POSTERIOR ETHMOID and SPHENOID  AND FRONTAL and BILATERAL INFERIOR TURBINATE REDUCTION (2  HR);  Surgeon: Ally Carmen MD;  Location: [facility] SURGICAL SERVICES;  Service: ENT;  Laterality: Bilateral;   • SINUS SURGERY  11/17/2021         Current Outpatient Medications:   •  losartan (COZAAR) 50 mg tablet, TAKE 1 TABLET(50 MG) BY MOUTH DAILY, Disp: 90 tablet, Rfl: 3  •  EPINEPHrine (EPIPEN) 0.3 mg/0.3 mL injection syringe, Inject 0.3 mL (0.3 mg total) into the shoulder, thigh, or buttocks as needed for anaphylaxis Call 911 after use., Disp: 1 each, Rfl: 1  •  sertraline (ZOLOFT) 100 mg tablet, TAKE 1 TABLET(100 MG) BY MOUTH DAILY, Disp: 90 tablet, Rfl: 1  •  cholecalciferol, vitamin D3, 125 mcg (5,000 unit) tablet, Take 1 tablet (5,000 Units total) by mouth daily, Disp: , Rfl:   •  spironolactone (ALDACTONE) 50 mg tablet, Take 1 tablet (50 mg total) by mouth daily, Disp: 90 tablet, Rfl: 3  •  levothyroxine (SYNTHROID, LEVOTHROID) 137 mcg tablet, TAKE 1 TABLET BY MOUTH DAILY, Disp: 90 tablet, Rfl: 3  •  fluticasone propionate (FLONASE) 50 mcg/actuation nasal spray, Administer 1 spray into each nostril daily, Disp: 16 g, Rfl: 5  •  Wixela Inhub 250-50 mcg/dose diskus inhaler, 1 puff 2 (two) times a day, Disp: , Rfl:   •  montelukast (SINGULAIR) 10 mg tablet, Take 1 tablet (10 mg total) by mouth nightly, Disp: 30 tablet, Rfl: 11  •  albuterol HFA (PROVENTIL HFA;VENTOLIN HFA) 90 mcg/actuation inhaler, Inhale 2 puffs every 2 (two) hours as needed for wheezing (every 2-3 hours as needed for cough/wheezing), Disp: 1 Inhaler, Rfl: 1  •  predniSONE (DELTASONE) 20 mg tablet, Take 1 tablet PO TID x 7 days THEN 1 tablet PO BID x 3 days THEN 1 tablet PO QD x 3 days THEN 1/2 tablet PO x 3 days with food, Disp: 32 tablet, Rfl: 0    Allergies   Allergen Reactions   • Adhesive Tape-Silicones Rash     Drape adhesive       family history includes Alcohol abuse in her maternal grandfather and paternal grandfather; Asthma in her son; Cancer in her mother's sister; Eczema in her son; Heart failure in her maternal grandmother;  Hypertension in her brother and mother; Multiple sclerosis in her father's brother; Other in her mother's brother and sister; Pacemaker/AICD in her paternal grandmother; Prostate cancer in her father; Stroke in her mother's brother; Tachycardia in her sister.    Social History     Tobacco Use   • Smoking status: Never Smoker   • Smokeless tobacco: Never Used   Vaping Use   • Vaping Use: Never used   Substance Use Topics   • Alcohol use: Yes     Alcohol/week: 0.0 standard drinks     Comment: occ   • Drug use: No       Review of Systems   Constitutional: Negative for chills and fever.   HENT: Positive for congestion, ear pain and sinus pressure. Negative for ear discharge, hearing loss, nosebleeds, postnasal drip, rhinorrhea, sneezing, sore throat, tinnitus and trouble swallowing.        Objective    Pulse 78   Temp 36.9 °C (98.5 °F)   Resp 16   Wt 80.7 kg (178 lb)   LMP 10/23/2021   SpO2 94%   BMI 28.73 kg/m²     PHYSICAL EXAMINATION:      GENERAL:  Well-developed, well-nourished.  The patient is alert, oriented and in no acute distress.        PSYCH: Normal mood and affect.        SKIN: No evidence of significant rash or concerning skin lesion on the head or neck.      HEAD/FACE:  Normally formed without evidence of mass or trauma.  The sinuses are nontender.        EARS: Bilateral external ears are normal.  Bilateral external auditory canals are normal.  Bilateral tympanic membranes intact and normal.      NOSE:  The external nose appears normal.  The septum is mostly midline.  The nasal mucosa including the turbinates appears edematous.  No pus.  Prominent clear mucus present.      ORAL CAVITY/OROPHARYNX:  There are no focal masses or lesions.  There are normal mucous membranes.  The tonsils appear normal without mass or focal lesion.  Oropharynx mucosa appears normal.      NECK:  There is no palpable adenopathy, goiter or mass.  The trachea is midline.      Procedure: Diagnostic nasal endoscopy.  To further  evaluate the patient's condition diagnostic nasal endoscopy was performed.  Anesthesia was with topical lidocaine and Afrin spray.  Nasal telescope was inserted through the nostril and into the right nasal cavity first and into the left.  The nasal cavity was examined bilaterally including the inferior turbinate, middle turbinate, superior turbinate, middle meatus and sphenoethmoid recess.  Pertinent findings include polypoid tissue coming from the middle meatus into the nasal cavity on the right.  Right side is very tight.  On the left there is edema of the middle meatus and some purulent discharge that was cultured.  No definite polyps seen on the left.  I could not advance the scope into the ethmoid cavity on either side due to narrow spaces and swollen tissues.      Diagnosis    1. Chronic pansinusitis    2. Severe persistent asthma without complication    3. Nasal polyp        Recommendations    Patient with worsening nasal endoscopy exam now about 7 months status post endoscopic sinus surgery.  There was quite a bit of polypoid mucosal change at the time of surgery.  Today's exam shows pretty extensive polyp filling the middle meatus and down into the nasal cavity on the right.  There is some purulent discharge on the left that was cultured.  I would like to try her on clarithromycin low-dose for anti-inflammatory effect for 60 days however this interacts with her asthma inhaler.  We are going to go ahead and treat her just with a prednisone taper and will await culture results before prescribing antibiotics for any pathogen cultured from the left maxillary culture.  I did discuss with the patient that we will continue pressing forward with further interventions and investigations as needed until we get her to a better state of sinus health.  She may be a patient who benefits from Dupixent or other biologic agent given her asthma and recalcitrant polyps.

## 2022-05-26 DIAGNOSIS — J32.4 CHRONIC PANSINUSITIS: Primary | ICD-10-CM

## 2022-05-26 LAB
25(OH)D2 SERPL-MCNC: <4 NG/ML
25(OH)D3 SERPL-MCNC: 49 NG/ML
25(OH)D3+25(OH)D2 SERPL-MCNC: 49 NG/ML
BACTERIA ISLT CULT: ABNORMAL

## 2022-05-26 RX ORDER — CEFUROXIME AXETIL 500 MG/1
500 TABLET ORAL 2 TIMES DAILY
Qty: 20 TABLET | Refills: 0 | Status: SHIPPED | OUTPATIENT
Start: 2022-05-26 | End: 2022-06-05

## 2022-07-12 ENCOUNTER — OFFICE VISIT (OUTPATIENT)
Dept: FAMILY MEDICINE | Facility: CLINIC | Age: 47
End: 2022-07-12
Payer: COMMERCIAL

## 2022-07-12 ENCOUNTER — OFFICE VISIT (OUTPATIENT)
Dept: OTOLARYNGOLOGY | Facility: CLINIC | Age: 47
End: 2022-07-12
Payer: COMMERCIAL

## 2022-07-12 VITALS
WEIGHT: 173 LBS | HEIGHT: 66 IN | BODY MASS INDEX: 27.8 KG/M2 | OXYGEN SATURATION: 95 % | SYSTOLIC BLOOD PRESSURE: 139 MMHG | HEART RATE: 92 BPM | TEMPERATURE: 98.5 F | DIASTOLIC BLOOD PRESSURE: 91 MMHG

## 2022-07-12 VITALS
TEMPERATURE: 97 F | SYSTOLIC BLOOD PRESSURE: 128 MMHG | OXYGEN SATURATION: 96 % | HEIGHT: 66 IN | WEIGHT: 174 LBS | BODY MASS INDEX: 27.97 KG/M2 | DIASTOLIC BLOOD PRESSURE: 90 MMHG | HEART RATE: 81 BPM

## 2022-07-12 DIAGNOSIS — J33.9 NASAL POLYP: ICD-10-CM

## 2022-07-12 DIAGNOSIS — J32.4 CHRONIC PANSINUSITIS: Primary | ICD-10-CM

## 2022-07-12 DIAGNOSIS — R10.11 RUQ ABDOMINAL PAIN: Primary | ICD-10-CM

## 2022-07-12 PROCEDURE — 99213 OFFICE O/P EST LOW 20 MIN: CPT | Performed by: FAMILY MEDICINE

## 2022-07-12 PROCEDURE — 99213 OFFICE O/P EST LOW 20 MIN: CPT | Mod: 25 | Performed by: OTOLARYNGOLOGY

## 2022-07-12 PROCEDURE — 31231 NASAL ENDOSCOPY DX: CPT | Performed by: OTOLARYNGOLOGY

## 2022-07-12 RX ORDER — FERROUS SULFATE 325(65) MG
325 TABLET ORAL DAILY
COMMUNITY

## 2022-07-12 ASSESSMENT — ENCOUNTER SYMPTOMS
SORE THROAT: 0
CHILLS: 0
RHINORRHEA: 0
SINUS PRESSURE: 1
CONSTITUTIONAL NEGATIVE: 1
TROUBLE SWALLOWING: 0
FEVER: 0

## 2022-07-12 NOTE — PROGRESS NOTES
Subjective    CC: Follow-up chronic sinusitis with nasal polyps    HPI: Tamiko Tiwari is a 47 y.o. female he is status post endoscopic sinus surgery in  by myself.  There were polyps at the time of surgery and since then she eventually has had regrowth of polyps.  A burst of prednisone given most recently on 24 May with subsequent taper gave her pretty good relief for about a month but things have flared back up.  Her nose is stuffy.  She has pressure in the nose and sinuses.  She has a feeling of thick discharge in the nose but she cannot get it out.    Past Medical History:   Diagnosis Date   • Allergic Pollen -    • Complex ovarian cyst    • Depression    • Endocrine disorder    • Hypertension    • Hypothyroid    • Iron deficiency 2020   • Psychiatric illness     depression   • Respiratory disease    • Severe persistent asthma with acute exacerbation 2020    Eosinophilic asthma; [place]       Past Surgical History:   Procedure Laterality Date   •  SECTION     • LAPAROSCOPIC HYSTERECTOMY N/A 2021    Procedure: LAPAROSCOPIC HYSTERECTOMY, right salpingo-oophorectomy, left salpingectomy, cystoscopy, omental biopsy.;  Surgeon: April Cao MD;  Location: [facility] OR;  Service: Gynecology;  Laterality: N/A;   • NASAL TURBINATE REDUCTION Bilateral 2021    Procedure: BILATERAL INFERIOR TURBINATE REDUCTION;  Surgeon: Ally Carmen MD;  Location: [facility];  Service: ENT;  Laterality: Bilateral;   • SINUS SURGERY Bilateral 2021    Procedure: FUNCTIONAL ENDOSCOPIC SINUS SURGERY WITH COMPUTER NAVIGATION - BILATERAL MAXILLAY and  ANTERIOR/POSTERIOR ETHMOID and SPHENOID  AND FRONTAL and BILATERAL INFERIOR TURBINATE REDUCTION (2 HR);  Surgeon: Ally Carmen MD;  Location: [facility];  Service: ENT;  Laterality: Bilateral;   • SINUS SURGERY  2021         Current Outpatient Medications:   •  ferrous sulfate 325 mg (65 mg iron) tablet, Take 325 mg by  mouth daily, Disp: , Rfl:   •  losartan (COZAAR) 50 mg tablet, TAKE 1 TABLET(50 MG) BY MOUTH DAILY, Disp: 90 tablet, Rfl: 3  •  sertraline (ZOLOFT) 100 mg tablet, TAKE 1 TABLET(100 MG) BY MOUTH DAILY, Disp: 90 tablet, Rfl: 1  •  cholecalciferol, vitamin D3, 125 mcg (5,000 unit) tablet, Take 1 tablet (5,000 Units total) by mouth daily, Disp: , Rfl:   •  spironolactone (ALDACTONE) 50 mg tablet, Take 1 tablet (50 mg total) by mouth daily, Disp: 90 tablet, Rfl: 3  •  levothyroxine (SYNTHROID, LEVOTHROID) 137 mcg tablet, TAKE 1 TABLET BY MOUTH DAILY, Disp: 90 tablet, Rfl: 3  •  Wixela Inhub 250-50 mcg/dose diskus inhaler, 1 puff 2 (two) times a day, Disp: , Rfl:   •  montelukast (SINGULAIR) 10 mg tablet, Take 1 tablet (10 mg total) by mouth nightly, Disp: 30 tablet, Rfl: 11  •  albuterol HFA (PROVENTIL HFA;VENTOLIN HFA) 90 mcg/actuation inhaler, Inhale 2 puffs every 2 (two) hours as needed for wheezing (every 2-3 hours as needed for cough/wheezing), Disp: 1 Inhaler, Rfl: 1    Allergies   Allergen Reactions   • Adhesive Tape-Silicones Rash     Drape adhesive       family history includes Alcohol abuse in her maternal grandfather and paternal grandfather; Asthma in her son; Cancer in her mother's sister; Eczema in her son; Heart failure in her maternal grandmother; Hypertension in her brother and mother; Multiple sclerosis in her father's brother; Other in her mother's brother and sister; Pacemaker/AICD in her paternal grandmother; Prostate cancer in her father; Stroke in her mother's brother; Tachycardia in her sister.    Social History     Tobacco Use   • Smoking status: Never Smoker   • Smokeless tobacco: Never Used   Vaping Use   • Vaping Use: Never used   Substance Use Topics   • Alcohol use: Yes     Alcohol/week: 0.0 standard drinks     Comment: occ   • Drug use: No       Review of Systems   Constitutional: Negative.  Negative for chills and fever.   HENT: Positive for congestion and sinus pressure. Negative for ear  "discharge, ear pain, hearing loss, nosebleeds, postnasal drip, rhinorrhea, sneezing, sore throat, tinnitus and trouble swallowing.        Objective    /91   Pulse 92   Temp 36.9 °C (98.5 °F) (Temporal)   Ht 1.676 m (5' 6\")   Wt 78.5 kg (173 lb)   LMP 10/23/2021   SpO2 95%   BMI 27.92 kg/m²     PHYSICAL EXAMINATION:      GENERAL:  Well-developed, well-nourished.  The patient is alert, oriented and in no acute distress.        PSYCH: Normal mood and affect.        SKIN: No evidence of significant rash or concerning skin lesion on the head or neck.      HEAD/FACE:  Normally formed without evidence of mass or trauma.  The sinuses are nontender.        EARS: Bilateral external ears are normal.  Bilateral external auditory canals are normal.  Bilateral tympanic membranes intact and normal.      NOSE:  The external nose appears normal.  The septum is mostly midline.  The turbinates appear hypertrophied and there is polyp tissue in the middle meatus..        ORAL CAVITY/OROPHARYNX:  There are no focal masses or lesions.  There are normal mucous membranes.  The tonsils appear normal without mass or focal lesion.  Oropharynx mucosa appears normal.      NECK:  There is no palpable adenopathy, goiter or mass.  The trachea is midline.    Procedure: Diagnostic nasal endoscopy.  To further evaluate the patient's condition diagnostic nasal endoscopy was performed.  Anesthesia was with topical lidocaine and Afrin spray.  Nasal telescope was inserted through the nostril and into the right nasal cavity first and into the left.  The nasal cavity was examined bilaterally including the inferior turbinate, middle turbinate, superior turbinate, middle meatus and sphenoethmoid recess.  Pertinent findings include edematous mucosa throughout.  Polyp tissue noted in middle meatus on the left more so than on the right but only because the turbinate mucosa on the right was edematous enough to limit visualization of the middle " meatus.  Scant yellow discharge on the left coming down over the inferior turbinate.    Diagnosis    1. Chronic pansinusitis    2. Nasal polyp        Recommendations    Patient with chronic pansinusitis with nasal polyps.  She has had only brief response to therapy with culture directed antibiotic plus prednisone burst and taper.  The nasal mucosa is extremely edematous today and she has significant polyp tissue present.  I recommend going ahead with topical budesonide.  We are going to get the process rolling of prescribing Dupixent as well.  If the topical budesonide is extremely successful we will avoid going ahead with the Dupixent but if it is not I would like to get a CT scan of the sinuses and proceed with Dupixent either with or without debulking surgery to remove polyps or mass depending on CT scan results.    Patient does note that she thinks she has tried topical budesonide in the past when she saw [facility] for her sinuses.  She is going to double check on that and let us know.  If she has tried and failed in the past we are not going to prescribe it again.

## 2022-07-13 ENCOUNTER — ANCILLARY PROCEDURE (OUTPATIENT)
Dept: ULTRASOUND IMAGING | Facility: CLINIC | Age: 47
End: 2022-07-13
Payer: COMMERCIAL

## 2022-07-13 ENCOUNTER — APPOINTMENT (OUTPATIENT)
Dept: LAB | Facility: CLINIC | Age: 47
End: 2022-07-13
Payer: COMMERCIAL

## 2022-07-13 DIAGNOSIS — R10.11 RUQ ABDOMINAL PAIN: ICD-10-CM

## 2022-07-13 LAB
ALBUMIN SERPL-MCNC: 4.2 G/DL (ref 3.5–5.3)
ALP SERPL-CCNC: 95 U/L (ref 39–100)
ALT SERPL-CCNC: 12 U/L (ref 7–52)
ANION GAP SERPL CALC-SCNC: 7 MMOL/L (ref 3–11)
AST SERPL-CCNC: 15 U/L
BASOPHILS # BLD AUTO: 0.1 10*3/UL
BASOPHILS NFR BLD AUTO: 1.1 % (ref 0–2)
BILIRUB SERPL-MCNC: 0.56 MG/DL (ref 0.2–1.4)
BUN SERPL-MCNC: 15 MG/DL (ref 7–25)
CALCIUM ALBUM COR SERPL-MCNC: 9.1 MG/DL (ref 8.6–10.3)
CALCIUM SERPL-MCNC: 9.3 MG/DL (ref 8.6–10.3)
CHLORIDE SERPL-SCNC: 107 MMOL/L (ref 98–107)
CO2 SERPL-SCNC: 27 MMOL/L (ref 21–32)
CREAT SERPL-MCNC: 0.62 MG/DL (ref 0.6–1.1)
EOSINOPHIL # BLD AUTO: 0.5 10*3/UL
EOSINOPHIL NFR BLD AUTO: 7.1 % (ref 0–3)
ERYTHROCYTE [DISTWIDTH] IN BLOOD BY AUTOMATED COUNT: 13.5 % (ref 11.5–14)
GFR SERPL CREATININE-BSD FRML MDRD: 110 ML/MIN/1.73M*2
GLUCOSE SERPL-MCNC: 96 MG/DL (ref 70–105)
HCT VFR BLD AUTO: 44.9 % (ref 34–45)
HGB BLD-MCNC: 15.3 G/DL (ref 11.5–15.5)
LYMPHOCYTES # BLD AUTO: 2.1 10*3/UL
LYMPHOCYTES NFR BLD AUTO: 32 % (ref 11–47)
MCH RBC QN AUTO: 30.6 PG (ref 28–33)
MCHC RBC AUTO-ENTMCNC: 34.1 G/DL (ref 32–36)
MCV RBC AUTO: 89.7 FL (ref 81–97)
MONOCYTES # BLD AUTO: 0.5 10*3/UL
MONOCYTES NFR BLD AUTO: 7.5 % (ref 3–11)
NEUTROPHILS # BLD AUTO: 3.4 10*3/UL
NEUTROPHILS NFR BLD AUTO: 52.3 % (ref 41–81)
PLATELET # BLD AUTO: 270 10*3/UL (ref 140–350)
PMV BLD AUTO: 8.5 FL (ref 6.9–10.8)
POTASSIUM SERPL-SCNC: 4.2 MMOL/L (ref 3.5–5.1)
PROT SERPL-MCNC: 6.9 G/DL (ref 6–8.3)
RBC # BLD AUTO: 5.01 10*6/ΜL (ref 3.7–5.3)
SODIUM SERPL-SCNC: 141 MMOL/L (ref 135–145)
WBC # BLD AUTO: 6.6 10*3/UL (ref 4.5–10.5)

## 2022-07-13 PROCEDURE — 36415 COLL VENOUS BLD VENIPUNCTURE: CPT | Performed by: FAMILY MEDICINE

## 2022-07-13 PROCEDURE — 76705 ECHO EXAM OF ABDOMEN: CPT | Mod: TC | Performed by: FAMILY MEDICINE

## 2022-07-13 PROCEDURE — 80053 COMPREHEN METABOLIC PANEL: CPT | Performed by: FAMILY MEDICINE

## 2022-07-13 PROCEDURE — 85025 COMPLETE CBC W/AUTO DIFF WBC: CPT | Performed by: FAMILY MEDICINE

## 2022-07-13 RX ORDER — DUPILUMAB 300 MG/2ML
300 INJECTION, SOLUTION SUBCUTANEOUS
Qty: 12 ML | Refills: 3 | Status: SHIPPED | OUTPATIENT
Start: 2022-07-13 | End: 2023-07-27

## 2022-07-13 NOTE — PROGRESS NOTES
"SUBJECTIVE:    Chief Complaint   Patient presents with   • Abdominal Pain       Tamiko Tiwari is a 47 y.o. old female who presents with right upper quadrant abdominal tightness and pressure for the past 6 weeks.  She is having looser stools for the past 3 to 4 weeks and they can sometimes be dark in color.  She reports no nausea, vomiting or urinary symptoms.  She has had a bit of weight loss, but that has been intentional.  Her pain does not radiate and it is not associated with any other symptoms.    She denies any prior history of abdominal pain.  Prior abdominal surgeries include laparoscopic hysterectomy and right oophorectomy.     ROS:     See HPI    The patient's past medical history, medications, allergies, family history and social history were reviewed in her electronic medical record at today's visit.    OBJECTIVE:  /90 (BP Location: Right arm, Patient Position: Sitting, Cuff Size: Regular Adult)   Pulse 81   Temp 36.1 °C (97 °F) (Temporal)   Ht 1.676 m (5' 6\")   Wt 78.9 kg (174 lb)   LMP 10/23/2021   SpO2 96%   BMI 28.08 kg/m²    General appearance: in no apparent distress.  Respiratory exam reveals:chest clear, no wheezing, rales, normal symmetric air entry, Heart exam - S1, S2 normal, no murmur, no gallop, rate regular  Abdominal exam: Soft, nondistended.  She has reproducible tenderness in the right upper quadrant.  No guarding or rebound, no hepatosplenomegaly or palpable mass.    LAB:  No results found for this or any previous visit (from the past 48 hour(s)).        ASSESSMENT:   Diagnosis Plan   1. RUQ abdominal pain  Comprehensive metabolic panel Blood, Venous    CBC w/auto differential Blood, Venous    US abdomen limited         PLAN:  Lab ordered and pending.  I have also recommended an ultrasound of her abdomen.  If ultrasound is nondiagnostic, proceed with HIDA scan.    Jacinda Duran MD    "

## 2022-07-27 ENCOUNTER — PATIENT MESSAGE (OUTPATIENT)
Dept: FAMILY MEDICINE | Facility: CLINIC | Age: 47
End: 2022-07-27
Payer: COMMERCIAL

## 2022-07-27 ENCOUNTER — HOSPITAL ENCOUNTER (OUTPATIENT)
Dept: NUCLEAR MEDICINE | Facility: HOSPITAL | Age: 47
Discharge: HOME | End: 2022-07-27
Payer: COMMERCIAL

## 2022-07-27 DIAGNOSIS — R10.11 RUQ ABDOMINAL PAIN: ICD-10-CM

## 2022-07-27 DIAGNOSIS — K81.1 CHRONIC CHOLECYSTITIS: Primary | ICD-10-CM

## 2022-07-27 PROCEDURE — 3430000500 HC RX DIAGNOSTIC RADIOPHARMACEUTICALS: Performed by: FAMILY MEDICINE

## 2022-07-27 PROCEDURE — G1004 CDSM NDSC: HCPCS

## 2022-07-27 PROCEDURE — A9537 TC99M MEBROFENIN: HCPCS | Performed by: FAMILY MEDICINE

## 2022-07-27 RX ORDER — KIT FOR THE PREPARATION OF TECHNETIUM TC 99M MEBROFENIN 45 MG/10ML
9-11 INJECTION, POWDER, LYOPHILIZED, FOR SOLUTION INTRAVENOUS ONCE
Status: COMPLETED | OUTPATIENT
Start: 2022-07-27 | End: 2022-07-27

## 2022-07-27 RX ADMIN — KIT FOR THE PREPARATION OF TECHNETIUM TC 99M MEBROFENIN 10.5 MILLICURIE: 45 INJECTION, POWDER, LYOPHILIZED, FOR SOLUTION INTRAVENOUS at 11:40

## 2022-07-29 DIAGNOSIS — K81.1 CHRONIC CHOLECYSTITIS: Primary | ICD-10-CM

## 2022-07-29 NOTE — TELEPHONE ENCOUNTER
From: Jacinda Duran MD  To: Tamiko Tiwari  Sent: 7/27/2022 4:41 PM MDT  Subject: HIDA scan    Tamiko,  Your HIDA scan is abnormal, showing that your gallbladder is not functioning properly. The next step is to see a general surgeon for consideration of having your gallbladder removed. Please let me know if you have a preference for who you see and I can make a referral.  Dr. Duran

## 2022-08-03 DIAGNOSIS — J33.9 NASAL POLYP: ICD-10-CM

## 2022-08-03 DIAGNOSIS — J32.4 CHRONIC PANSINUSITIS: Primary | ICD-10-CM

## 2022-08-03 RX ORDER — DUPILUMAB 300 MG/2ML
300 INJECTION, SOLUTION SUBCUTANEOUS
Qty: 12 ML | Refills: 3 | Status: SHIPPED | OUTPATIENT
Start: 2022-08-03 | End: 2023-01-04 | Stop reason: ALTCHOICE

## 2022-08-04 ENCOUNTER — SPECIALTY PHARMACY (OUTPATIENT)
Dept: PHARMACY | Facility: HOSPITAL | Age: 47
End: 2022-08-04
Payer: COMMERCIAL

## 2022-08-04 NOTE — PROGRESS NOTES
Artemio is approved by Tamiko's insurance. Is she ok to contact to get started?  We will help her get a copay card to bring copay down to $0-$5.    Thank you

## 2022-08-08 ENCOUNTER — TELEPHONE (OUTPATIENT)
Dept: SURGERY | Facility: CLINIC | Age: 47
End: 2022-08-08

## 2022-08-09 NOTE — TELEPHONE ENCOUNTER
Caller would like to request: Return Call      Patient: Tamiko Tiwari    Callback Number: [phone]    Best call Availability: as soon as possible    Return call to: [facility]    Additional Info: Patient stated returning call to [facility] to schedule.    PAS attempted to call the FD? no  (Yes/ No)    PAS attempted to reach the Nurse? no (Yes/ No)    If so, did Nurse accept call? N/a   (Yes/ No)    I advised caller of a callback by 24-48 hours.

## 2022-08-15 ENCOUNTER — TELEPHONE (OUTPATIENT)
Dept: SURGERY | Facility: CLINIC | Age: 47
End: 2022-08-15
Payer: COMMERCIAL

## 2022-08-15 NOTE — TELEPHONE ENCOUNTER
Caller would like to discuss (a) return call     Patient: Tamiko Tiwari    Callback Number: [phone]    Best Availability: as soon as possible    PAS attempted to call the FD? Yes, no answer    Additional Info: Patient called stating she was returning a call to Kathleen to schedule a consult    I advised caller of a callback by 24-48 hours.

## 2022-08-17 ENCOUNTER — TELEPHONE (OUTPATIENT)
Dept: OTOLARYNGOLOGY | Facility: CLINIC | Age: 47
End: 2022-08-17
Payer: COMMERCIAL

## 2022-08-18 ENCOUNTER — OFFICE VISIT NO LOS (OUTPATIENT)
Dept: AUDIOLOGY | Facility: CLINIC | Age: 47
End: 2022-08-18
Payer: COMMERCIAL

## 2022-08-18 DIAGNOSIS — H90.3 SENSORINEURAL HEARING LOSS (SNHL) OF BOTH EARS: Primary | ICD-10-CM

## 2022-08-18 PROCEDURE — 92557 COMPREHENSIVE HEARING TEST: CPT | Performed by: AUDIOLOGIST

## 2022-08-18 PROCEDURE — V5014 HEARING AID REPAIR/MODIFYING: HCPCS | Mod: NC | Performed by: AUDIOLOGIST

## 2022-08-18 NOTE — PROGRESS NOTES
ADULT HEARING EVALUATION REPORT      HISTORY  Tamiko Tiwari was seen at [facility] on 8/18/2022, for a hearing evaluation. Patient reports having difficulty hearing and understanding conversations even with her hearing aids in. She is reporting many instances were what she hears and what was said is not the same thing, resulting in difficultly maintaining conversation. She reports her  has noticed this and brought it to her attention. She reports the hearing aids do not seem to be enough.  No other concerns were reported at this time.       OTOSCOPY  Otoscopy revealed clear ear canals and visualization of tympanic membranes bilaterally.     AUDIOLOGICAL RESULTS  Audiometric results revealed a mild sensorineural  hearing loss at 250 Hz, sloping to a moderate sensorineural  hearing loss from 500 - 1000Hz, a moderately-severe sensorineural  hearing loss from 1500 - 2000 Hz, and to a profound sensorineural hearing loss from 0058-8873 Hz, no response at 8000 Hz, bilaterally.     Speech recognition thresholds (SRT) were obtained at 50 dB HL in the right ear and at 60 dB HL in the left ear. SRTs are in excellent agreement with pure tone findings, bilaterally.     Word recognition ability, when tested at a comfortable listening level, is good bilaterally.   Right: 80% at a presentation level of 80 dB HL (45 dB HL masking) / Left: 84% at a presentation level of 85 dB HL (50 dB HL masking)    IMPRESSIONS  Results reveal hearing thresholds have shifted by about 5-10 dB across the frequency range tested since patients previous hearing evaluation in November of 2020. This degree of change is typical of sensorineural hearing losses over the time of two years. This type and degree of hearing loss can make it challenging to understand speech with clarity making communication increasingly difficult. These results suggest Ms. Tiwari remains a good candidate for amplification at this time. If she  continues to find conversation difficult she may find benefit from an assistive listening device (ALD).     RECOMMENDATIONS  Audiometric findings were reviewed with MsAnh Karie, and the following recommendations are made:    Continued daily use of amplification.   Hearing aids were reprogramming today based upon changes identified on audiogram. Hearing aids were verified using on ear speech measures with NAL-NL2 targets and average RECDs. (No charge. )  Annual audiometric evaluation to monitor hearing sensitivity.  Stringent use of hearing protection when exposed to excessive sound levels.

## 2022-08-19 ENCOUNTER — SPECIALTY PHARMACY (OUTPATIENT)
Dept: PHARMACY | Facility: HOSPITAL | Age: 47
End: 2022-08-19
Payer: COMMERCIAL

## 2022-08-26 ENCOUNTER — PATIENT MESSAGE (OUTPATIENT)
Dept: FAMILY MEDICINE | Facility: CLINIC | Age: 47
End: 2022-08-26
Payer: COMMERCIAL

## 2022-08-26 DIAGNOSIS — U07.1 COVID-19: Primary | ICD-10-CM

## 2022-08-26 RX ORDER — NIRMATRELVIR AND RITONAVIR 300-100 MG
3 KIT ORAL 2 TIMES DAILY
Qty: 30 TABLET | Refills: 0 | Status: SHIPPED | OUTPATIENT
Start: 2022-08-26 | End: 2022-08-31

## 2022-08-26 NOTE — TELEPHONE ENCOUNTER
"From: Tamiko Tiwari  To: Jacinda Duran MD  Sent: 8/26/2022 7:08 AM MDT  Subject: COVID positive    Hello Dr. Duran,  I tested positive this morning for COVID and my  mentioned a COVID \"cocktail\" that would help improve or help you heal faster from COVID for individuals that are immunocompromised. In particular, my case with Asthma and nasal issues. Let me know if this is something that I could consider. Thank you.    Tamiko"

## 2022-08-29 ENCOUNTER — SPECIALTY PHARMACY (OUTPATIENT)
Dept: PHARMACY | Facility: HOSPITAL | Age: 47
End: 2022-08-29
Payer: COMMERCIAL

## 2022-08-29 NOTE — PROGRESS NOTES
Spoke with Tamiko for first follow up after initiation of Dupixent therapy.  She states that she did well following the injection on 8/19 and denies any adverse effects of medication, including injection site reaction.  Unfortunately she tested positive for COVID on 8/26 and has been battling that, but states that she feels as though she has turned a corner and is beginning to feel better.  She is due for her second Dupixent injection on 9/2 and is wondering if she should go ahead with second injection.  She states she was running fevers over the weekend, but doesn't think that she had one today.  She is still very congested and tired.  Encouraged her to reach out to her PCP, Dr. Duran, and ask if she would recommend giving her injection on Friday as scheduled or postpone.  Tamiko verbalizes understanding and states she will send a message to Dr. Duran.  No further questions or concerns verbalized.  Will follow up with Tamiko next month.

## 2022-09-07 ENCOUNTER — SPECIALTY PHARMACY (OUTPATIENT)
Dept: PHARMACY | Facility: HOSPITAL | Age: 47
End: 2022-09-07
Payer: COMMERCIAL

## 2022-09-07 NOTE — PROGRESS NOTES
Tamiko called the pharmacy with concerns that she didn't inject her last dose of Dupixent correctly.  She was concerned that the injection was more uncomfortable than her first dose which resulted in her pulling the pen away from the injection site immediately after the injection was complete instead of waiting the 5 seconds after the plunger was fully depressed.  She reports that, when she pulled the pen away, she noticed a bump under the skin and some clear fluid leaking from the injection site.  She states that the bump resolved over night and the site is unremarkable presently.  She is concerned that she may have missed the dose and wanted to know if she needed to inject again.  Assured her that it sounds as though she injected correctly as the bump under her skin was likely the medication before it dispersed in the tissue.  Assured Tamiko that it is not uncommon for a little bit of the medication to leak back after injection, which is why it is recommended to keep pen pressed against the skin for 5 seconds following the injection in order to allow for some dispersion of the medication and minimize the leak back.  Assured her that some areas are more tender to inject in than others and suggested she try to avoid this area for future injections.  Tamiko verbalized understanding and has no further concerns.  Will follow up next month to reassess.

## 2022-09-17 DIAGNOSIS — G47.00 INSOMNIA, UNSPECIFIED TYPE: ICD-10-CM

## 2022-09-17 RX ORDER — SERTRALINE HYDROCHLORIDE 100 MG/1
TABLET, FILM COATED ORAL
Qty: 90 TABLET | Refills: 3 | Status: SHIPPED | OUTPATIENT
Start: 2022-09-17 | End: 2023-10-12

## 2022-09-17 NOTE — TELEPHONE ENCOUNTER
Care Due:                  Date            Visit Type   Department     Provider  --------------------------------------------------------------------------------                              ESTABLISHED   [facility]  Last Visit: 07-      PATIENT      MEDICINE       [facility]  Next Visit: None Scheduled  None         None Found                                                            Last  Test          Frequency    Reason                     Performed    Due Date  --------------------------------------------------------------------------------  TSH.........  12 months..  levothyroxine............  07- 07-    [facility] Embedded Care Gaps. Reference number: [id number]. 9/17/2022 4:58:46 AM MIKO

## 2022-10-06 ENCOUNTER — SPECIALTY PHARMACY (OUTPATIENT)
Dept: PHARMACY | Facility: HOSPITAL | Age: 47
End: 2022-10-06
Payer: COMMERCIAL

## 2022-10-06 NOTE — PROGRESS NOTES
"Spoke with Tamiko for follow up assessment during Dupixent therapy.  She states that the injections are going well and that she has not experienced any \"bubbling up\" under the skin or injection site reactions since we spoke last.  She states that she has not had any sinus or congestion issues since starting Dupixent and is pleased with that, however she reports that she is very fatigued for a day or two after she gives her injection and wonders if it is a side effect of the Dupixent.  Reviewed  insert with Tamiko and explained that fatigue is not listed among the reported adverse effects of Dupixent that have been recorded by the .  We discussed that Tamiko was diagnosed with COVID a couple of days after she started the medication and is also scheduled to have her non-functioning gallbladder removed at the end of this month - both of which are conditions that could very possibly contribute to her fatigue.  I asked Tamiko if she had discussed whether her surgeon wants her to hold her next dose of Dupixent (due 10/18) prior to her surgery scheduled for 10/26.  Tamiko states she hasn't discussed it with him yet, but states she will call his office tomorrow to check on her current medications, including Dupixent, and if she should hold any prior to surgery.   She has no further questions or concerns presently.  She states she plans on continuing Dupixent therapy for the present time and will re-evaluate at a later date whether or not she wants to take in the future if fatigue persists.  Will schedule follow up for 1-2 months, following planned procedure.    "

## 2022-11-02 ENCOUNTER — OFFICE VISIT (OUTPATIENT)
Dept: OTOLARYNGOLOGY | Facility: CLINIC | Age: 47
End: 2022-11-02
Payer: COMMERCIAL

## 2022-11-02 VITALS
HEART RATE: 99 BPM | WEIGHT: 175 LBS | DIASTOLIC BLOOD PRESSURE: 86 MMHG | HEIGHT: 67 IN | SYSTOLIC BLOOD PRESSURE: 137 MMHG | TEMPERATURE: 98.2 F | BODY MASS INDEX: 27.47 KG/M2

## 2022-11-02 DIAGNOSIS — J32.4 CHRONIC PANSINUSITIS: Primary | ICD-10-CM

## 2022-11-02 DIAGNOSIS — J45.50 SEVERE PERSISTENT ASTHMA WITHOUT COMPLICATION: ICD-10-CM

## 2022-11-02 DIAGNOSIS — J33.9 NASAL POLYP: ICD-10-CM

## 2022-11-02 PROCEDURE — 31231 NASAL ENDOSCOPY DX: CPT | Performed by: OTOLARYNGOLOGY

## 2022-11-02 PROCEDURE — 99213 OFFICE O/P EST LOW 20 MIN: CPT | Mod: 25 | Performed by: OTOLARYNGOLOGY

## 2022-11-02 ASSESSMENT — ENCOUNTER SYMPTOMS
PSYCHIATRIC NEGATIVE: 1
MUSCULOSKELETAL NEGATIVE: 1
ENDOCRINE NEGATIVE: 1
FEVER: 0
NEUROLOGICAL NEGATIVE: 1
CONSTITUTIONAL NEGATIVE: 1
CHILLS: 0
TROUBLE SWALLOWING: 0
EYES NEGATIVE: 1
RESPIRATORY NEGATIVE: 1
GASTROINTESTINAL NEGATIVE: 1
HEMATOLOGIC/LYMPHATIC NEGATIVE: 1
SORE THROAT: 0
CARDIOVASCULAR NEGATIVE: 1
RHINORRHEA: 0
ALLERGIC/IMMUNOLOGIC NEGATIVE: 1

## 2022-11-02 NOTE — PROGRESS NOTES
Subjective    CC: Follow-up nasal polyps and chronic sinusitis    HPI: Tamiko Tiwari is a 47 y.o. female with nasal polyp and chronic sinusitis.  Surgery was about a year ago and she had pretty aggressive regrowth of polyps.  She has asthma as well.  She had elevated eosinophils.  She did not have good response to topical budesonide or oral steroid in a meaningful way.  For that reason we started her on Dupixent which she has been taking for about 2 months.  She had to stop it once for COVID and also stopped it last week for cholecystectomy.  She states the nose actually is feeling a lot better since starting the Dupixent.  She is breathing better through the nose.  She is not having pressure in discharge.  She does feel like the nose is a little dry and crusted at times.    Past Medical History:   Diagnosis Date    Allergic Pollen -     Complex ovarian cyst     Depression     Endocrine disorder     Hypertension     Hypothyroid     Iron deficiency 2020    Psychiatric illness     depression    Respiratory disease     Severe persistent asthma with acute exacerbation 2020    Eosinophilic asthma; [place]       Past Surgical History:   Procedure Laterality Date     SECTION      CHOLECYSTECTOMY N/A 10/26/2022    Procedure: LAPAROSCOPIC CHOLECYSTECTOMY;  Surgeon: Joseph Altamirano MD;  Location: [facility] OR;  Service: General;  Laterality: N/A;    LAPAROSCOPIC HYSTERECTOMY N/A 2021    Procedure: LAPAROSCOPIC HYSTERECTOMY, right salpingo-oophorectomy, left salpingectomy, cystoscopy, omental biopsy.;  Surgeon: April Cao MD;  Location: [facility] OR;  Service: Gynecology;  Laterality: N/A;    NASAL TURBINATE REDUCTION Bilateral 2021    Procedure: BILATERAL INFERIOR TURBINATE REDUCTION;  Surgeon: Ally Carmen MD;  Location: [facility] SERVICES;  Service: ENT;  Laterality: Bilateral;    SINUS SURGERY Bilateral 2021    Procedure: FUNCTIONAL ENDOSCOPIC SINUS SURGERY WITH COMPUTER  NAVIGATION - BILATERAL MAXILLAY and  ANTERIOR/POSTERIOR ETHMOID and SPHENOID  AND FRONTAL and BILATERAL INFERIOR TURBINATE REDUCTION (2 HR);  Surgeon: Ally Carmen MD;  Location: [facility] SURGICAL SERVICES;  Service: ENT;  Laterality: Bilateral;    SINUS SURGERY  11/17/2021         Current Outpatient Medications:     acetaminophen (TYLENOL) 325 mg tablet, Take 1-2 tablets (325-650 mg total) by mouth every 4 (four) hours as needed for pain scale 1-3/10, Disp: 50 tablet, Rfl: 1    docusate sodium (Colace) 100 mg capsule, Take 1 capsule (100 mg total) by mouth 2 (two) times a day To Avoid constipation.  Hold if loose BM's., Disp: 50 capsule, Rfl: 1    ibuprofen (ADVIL,MOTRIN) 200 mg tablet, Take 2 tablets (400 mg total) by mouth every 4 (four) hours as needed for pain scale 1-3/10 for up to 10 days, Disp: 50 tablet, Rfl: 0    magnesium hydroxide (MILK OF MAGNESIA) 400 mg/5 mL suspension, Take 30 mL by mouth 2 (two) times a day as needed (Constipation) 30 ml. MOM 2x/day as needed for constipation, Disp: 120 mL, Rfl: 1    ondansetron ODT (ZOFRAN-ODT) 4 mg disintegrating tablet, Take 1 tablet (4 mg total) by mouth every 6 (six) hours as needed for nausea or vomiting Dissolve under the tongue, Disp: 2 tablet, Rfl: 2    sertraline (ZOLOFT) 100 mg tablet, TAKE 1 TABLET(100 MG) BY MOUTH DAILY, Disp: 90 tablet, Rfl: 3    dupilumab (Dupixent Pen) 300 mg/2 mL pen injector, Inject 300 mg under the skin every 14 (fourteen) days, Disp: 12 mL, Rfl: 3    dupilumab (Dupixent Pen) 300 mg/2 mL pen injector, Inject 300 mg under the skin every 14 (fourteen) days, Disp: 12 mL, Rfl: 3    ferrous sulfate 325 mg (65 mg iron) tablet, Take 325 mg by mouth daily, Disp: , Rfl:     losartan (COZAAR) 50 mg tablet, TAKE 1 TABLET(50 MG) BY MOUTH DAILY, Disp: 90 tablet, Rfl: 3    cholecalciferol, vitamin D3, 125 mcg (5,000 unit) tablet, Take 1 tablet (5,000 Units total) by mouth daily, Disp: , Rfl:     spironolactone (ALDACTONE) 50 mg tablet, Take 1  tablet (50 mg total) by mouth daily, Disp: 90 tablet, Rfl: 3    levothyroxine (SYNTHROID, LEVOTHROID) 137 mcg tablet, TAKE 1 TABLET BY MOUTH DAILY, Disp: 90 tablet, Rfl: 3    Wixela Inhub 250-50 mcg/dose diskus inhaler, 1 puff 2 (two) times a day, Disp: , Rfl:     albuterol HFA (PROVENTIL HFA;VENTOLIN HFA) 90 mcg/actuation inhaler, Inhale 2 puffs every 2 (two) hours as needed for wheezing (every 2-3 hours as needed for cough/wheezing), Disp: 1 Inhaler, Rfl: 1    Allergies   Allergen Reactions    Adhesive Tape-Silicones Rash     Drape adhesive       family history includes Alcohol abuse in her maternal grandfather and paternal grandfather; Asthma in her son; Cancer in her mother's sister; Eczema in her son; Heart failure in her maternal grandmother; Hypertension in her brother and mother; Multiple sclerosis in her father's brother; Other in her mother's brother and sister; Pacemaker/AICD in her paternal grandmother; Prostate cancer in her father; Stroke in her mother's brother; Tachycardia in her sister.    Social History     Tobacco Use    Smoking status: Never    Smokeless tobacco: Never   Vaping Use    Vaping Use: Never used   Substance Use Topics    Alcohol use: Yes     Alcohol/week: 0.0 standard drinks     Comment: occ    Drug use: No       Review of Systems   Constitutional: Negative.  Negative for chills and fever.   HENT: Negative.  Negative for congestion, ear discharge, ear pain, hearing loss, nosebleeds, postnasal drip, rhinorrhea, sneezing, sore throat, tinnitus and trouble swallowing.    Eyes: Negative.    Respiratory: Negative.     Cardiovascular: Negative.    Gastrointestinal: Negative.    Endocrine: Negative.    Genitourinary: Negative.    Musculoskeletal: Negative.    Skin: Negative.    Allergic/Immunologic: Negative.    Neurological: Negative.    Hematological: Negative.    Psychiatric/Behavioral: Negative.       Objective    /86 (BP Location: Left arm, Patient Position: Sitting, Cuff Size:  "Long Adult)   Pulse 99   Temp 36.8 °C (98.2 °F) (Temporal)   Ht 1.702 m (5' 7\")   Wt 79.4 kg (175 lb)   LMP 10/23/2021   BMI 27.41 kg/m²     PHYSICAL EXAMINATION:      GENERAL:  Well-developed, well-nourished.  The patient is alert, oriented and in no acute distress.        PSYCH: Normal mood and affect.        SKIN: No evidence of significant rash or concerning skin lesion on the head or neck.      HEAD/FACE:  Normally formed without evidence of mass or trauma.  The sinuses are nontender.        EARS: Bilateral external ears are normal.  Bilateral external auditory canals are normal.  Bilateral tympanic membranes intact and normal.      NOSE:  The external nose appears normal.  The septum is mostly midline.  The turbinates appear normal.  No pus or polyps visible on anterior rhinoscopy.      Procedure: Diagnostic nasal endoscopy.  To further evaluate the patient's condition diagnostic nasal endoscopy was performed.  Anesthesia was with topical lidocaine and Afrin spray.  Nasal telescope was inserted through the nostril and into the right nasal cavity first and into the left.  The nasal cavity was examined bilaterally including the inferior turbinate, middle turbinate, superior turbinate, middle meatus and sphenoethmoid recess.  Pertinent findings include patent middle meatus bilaterally.  No pus or polyps seen.  Scant crusting just at the inferior part of the middle turbinate on both sides.  Overall significant improvement compared to last endoscopy in July.  The scope could not be advanced into the middle meatus itself to closely examine the ethmoid cavity.  With that said to the extent visualized the ethmoid cavity appears clear and patent and normal in the maxillary sinusotomy appears widely patent on both sides with normal-appearing tissues.      Diagnosis    1. Chronic pansinusitis    2. Nasal polyp    3. Severe persistent asthma without complication        Recommendations    47-year-old female with " history of chronic pansinusitis with nasal polyps as well as asthma.  She is doing much better now that she started Dupixent about 2 months ago.  I recommend she discuss resuming the Dupixent with her general surgeon at her postop visit tomorrow.  I think it is totally fine for her to restart the medication now 1 week status post her cholecystectomy as long as her general surgeon agrees.  She looks much better on nasal endoscopy today than she did prior to starting Dupixent.  I recommend follow-up exam in 6 months.  Come back sooner for any problems or concerns.  For the slight dryness and crusting at times I recommend saline nasal gel or xylitol containing sinus rinses.

## 2022-12-08 ENCOUNTER — OFFICE VISIT NO LOS (OUTPATIENT)
Dept: AUDIOLOGY | Facility: CLINIC | Age: 47
End: 2022-12-08
Payer: COMMERCIAL

## 2022-12-08 DIAGNOSIS — Z46.1 HEARING AID FITTING OR ADJUSTMENT: Primary | ICD-10-CM

## 2022-12-08 PROCEDURE — V5014 HEARING AID REPAIR/MODIFYING: HCPCS | Mod: NC | Performed by: AUDIOLOGIST

## 2022-12-10 NOTE — PROGRESS NOTES
Hearing Aid Adjustment    Tamiko Tiwari was seen on 12/8/2022 for a hearing aid adjustment at [facility].      Subjective   Patient reports that her hearing aids sound weak and that her  has noticed she is not hearing as well as she had previously.      Objective / Assessment   Cleaned hearing aids.    Performed listening check, this revealed that the hearing aids sounded weak. Changed receivers (2P) and this increased sound quality. Patient has incased (cShell) ear molds that will need to be sent in for repair. Placed medium double dome ear buds on receivers while her ear molds are in for repair.   Did not connected hearing aids to the computer.     Plan  Ms. Tiwari should return to the clinic when the air molds are back from repair - we will call patient.          No Charge today.

## 2022-12-22 ENCOUNTER — SPECIALTY PHARMACY (OUTPATIENT)
Dept: PHARMACY | Facility: HOSPITAL | Age: 47
End: 2022-12-22
Payer: COMMERCIAL

## 2022-12-22 NOTE — PROGRESS NOTES
Spoke with Tamiko for follow up assessment during Dupixent therapy.  She reports that she is doing well with the injections and has not missed any doses since we spoke last.  She is realizing benefit from the Dupixent with regard to her nasal polyps and congestion, however she reports that she is having injection site reactions that seem to be worsening.  She states that at first she did not react at all to the injections, then she gave a dose about 3 weeks ago on her left thigh after which she noticed redness and rash with itching in an area about the size of a silver dollar around the injection site.  The redness and itching seemed to resolve within about 3-4 days, although she states she can still see some very faint discoloration outlining the area where the reaction was noted.  She gave her last dose a week ago and reports that her injection site reaction this time was larger, more itchy, more red, and doesn't seem to be going away after 7 days.  She states that she put some triamcinolone cream on the site but it didn't seem to help.  Suggested to Tamiko that is sounds as if her reactions to the Dupixent are getting increasingly worse and advised her to call Dr. Carmen for recommendations on therapy going forward.  Tamiko agrees, states she will call Dr. Carmen' office and let us know what he recommends.  She has no other questions or concerns at this time.  Medication list was reviewed, no updates noted.  Will schedule intervention in 2 weeks to monitor.

## 2022-12-27 ENCOUNTER — OFFICE VISIT (OUTPATIENT)
Dept: FAMILY MEDICINE | Facility: CLINIC | Age: 47
End: 2022-12-27
Payer: COMMERCIAL

## 2022-12-27 ENCOUNTER — APPOINTMENT (OUTPATIENT)
Dept: LAB | Facility: CLINIC | Age: 47
End: 2022-12-27
Payer: COMMERCIAL

## 2022-12-27 ENCOUNTER — PATIENT MESSAGE (OUTPATIENT)
Dept: FAMILY MEDICINE | Facility: CLINIC | Age: 47
End: 2022-12-27

## 2022-12-27 VITALS
SYSTOLIC BLOOD PRESSURE: 122 MMHG | WEIGHT: 175 LBS | HEIGHT: 67 IN | OXYGEN SATURATION: 97 % | DIASTOLIC BLOOD PRESSURE: 84 MMHG | HEART RATE: 72 BPM | BODY MASS INDEX: 27.47 KG/M2 | TEMPERATURE: 97.1 F

## 2022-12-27 DIAGNOSIS — E61.1 IRON DEFICIENCY: ICD-10-CM

## 2022-12-27 DIAGNOSIS — R53.83 OTHER FATIGUE: Primary | ICD-10-CM

## 2022-12-27 DIAGNOSIS — E03.9 ACQUIRED HYPOTHYROIDISM: Primary | Chronic | ICD-10-CM

## 2022-12-27 DIAGNOSIS — E03.9 ACQUIRED HYPOTHYROIDISM: Chronic | ICD-10-CM

## 2022-12-27 DIAGNOSIS — R07.81 RIB PAIN ON RIGHT SIDE: ICD-10-CM

## 2022-12-27 PROBLEM — E53.8 B12 DEFICIENCY: Status: ACTIVE | Noted: 2022-12-27

## 2022-12-27 LAB
ALBUMIN SERPL-MCNC: 4.2 G/DL (ref 3.5–5.3)
ALP SERPL-CCNC: 106 U/L (ref 39–100)
ALT SERPL-CCNC: 15 U/L (ref 7–52)
ANION GAP SERPL CALC-SCNC: 8 MMOL/L (ref 3–11)
AST SERPL-CCNC: 16 U/L
BASOPHILS # BLD AUTO: 0.1 10*3/UL
BASOPHILS NFR BLD AUTO: 1.1 % (ref 0–2)
BILIRUB SERPL-MCNC: 0.38 MG/DL (ref 0.2–1.4)
BUN SERPL-MCNC: 19 MG/DL (ref 7–25)
CALCIUM ALBUM COR SERPL-MCNC: 9.3 MG/DL (ref 8.6–10.3)
CALCIUM SERPL-MCNC: 9.5 MG/DL (ref 8.6–10.3)
CHLORIDE SERPL-SCNC: 102 MMOL/L (ref 98–107)
CO2 SERPL-SCNC: 27 MMOL/L (ref 21–32)
CREAT SERPL-MCNC: 0.77 MG/DL (ref 0.6–1.1)
EOSINOPHIL # BLD AUTO: 0.5 10*3/UL
EOSINOPHIL NFR BLD AUTO: 6.6 % (ref 0–3)
ERYTHROCYTE [DISTWIDTH] IN BLOOD BY AUTOMATED COUNT: 13.4 % (ref 11.5–14)
FERRITIN SERPL-MCNC: 93 NG/ML (ref 11–307)
GFR SERPL CREATININE-BSD FRML MDRD: 95 ML/MIN/1.73M*2
GLUCOSE SERPL-MCNC: 91 MG/DL (ref 70–105)
HCT VFR BLD AUTO: 44 % (ref 34–45)
HGB BLD-MCNC: 15.2 G/DL (ref 11.5–15.5)
IRON SATN MFR SERPL: 24 % (ref 13–50)
IRON SERPL-MCNC: 83 UG/DL (ref 50–175)
LYMPHOCYTES # BLD AUTO: 2.8 10*3/UL
LYMPHOCYTES NFR BLD AUTO: 37.1 % (ref 11–47)
MCH RBC QN AUTO: 31.4 PG (ref 28–33)
MCHC RBC AUTO-ENTMCNC: 34.5 G/DL (ref 32–36)
MCV RBC AUTO: 90.9 FL (ref 81–97)
MONOCYTES # BLD AUTO: 0.6 10*3/UL
MONOCYTES NFR BLD AUTO: 8.2 % (ref 3–11)
NEUTROPHILS # BLD AUTO: 3.5 10*3/UL
NEUTROPHILS NFR BLD AUTO: 47 % (ref 41–81)
PLATELET # BLD AUTO: 277 10*3/UL (ref 140–350)
PMV BLD AUTO: 8 FL (ref 6.9–10.8)
POTASSIUM SERPL-SCNC: 4.7 MMOL/L (ref 3.5–5.1)
PROT SERPL-MCNC: 7.3 G/DL (ref 6–8.3)
RBC # BLD AUTO: 4.84 10*6/ΜL (ref 3.7–5.3)
SODIUM SERPL-SCNC: 137 MMOL/L (ref 135–145)
TIBC SERPL-MCNC: 341 UG/DL (ref 250–450)
TSH SERPL DL<=0.05 MIU/L-ACNC: 13.59 UIU/ML (ref 0.34–4.82)
UIBC SERPL-MCNC: 258 UG/DL (ref 155–355)
VIT B12 SERPL-MCNC: 270 PG/ML (ref 180–914)
WBC # BLD AUTO: 7.4 10*3/UL (ref 4.5–10.5)

## 2022-12-27 PROCEDURE — 83550 IRON BINDING TEST: CPT | Performed by: FAMILY MEDICINE

## 2022-12-27 PROCEDURE — 84443 ASSAY THYROID STIM HORMONE: CPT | Performed by: FAMILY MEDICINE

## 2022-12-27 PROCEDURE — 82607 VITAMIN B-12: CPT | Performed by: FAMILY MEDICINE

## 2022-12-27 PROCEDURE — 83540 ASSAY OF IRON: CPT | Performed by: FAMILY MEDICINE

## 2022-12-27 PROCEDURE — 99214 OFFICE O/P EST MOD 30 MIN: CPT | Performed by: FAMILY MEDICINE

## 2022-12-27 PROCEDURE — 36415 COLL VENOUS BLD VENIPUNCTURE: CPT | Performed by: FAMILY MEDICINE

## 2022-12-27 PROCEDURE — 80053 COMPREHEN METABOLIC PANEL: CPT | Performed by: FAMILY MEDICINE

## 2022-12-27 PROCEDURE — 85025 COMPLETE CBC W/AUTO DIFF WBC: CPT | Performed by: FAMILY MEDICINE

## 2022-12-27 PROCEDURE — 82728 ASSAY OF FERRITIN: CPT | Performed by: FAMILY MEDICINE

## 2022-12-27 RX ORDER — CELECOXIB 200 MG/1
200 CAPSULE ORAL 2 TIMES DAILY
Qty: 180 CAPSULE | Refills: 3 | Status: SHIPPED | OUTPATIENT
Start: 2022-12-27 | End: 2024-03-24 | Stop reason: ALTCHOICE

## 2022-12-27 RX ORDER — LEVOTHYROXINE SODIUM 150 UG/1
150 TABLET ORAL DAILY
Qty: 30 TABLET | Refills: 11 | Status: SHIPPED | OUTPATIENT
Start: 2022-12-27 | End: 2023-12-20

## 2022-12-27 ASSESSMENT — PAIN SCALES - GENERAL: PAINLEVEL: 0-NO PAIN

## 2022-12-27 NOTE — TELEPHONE ENCOUNTER
From: Jacinda Duran MD  To: Tamiko Tiwari  Sent: 12/27/2022 8:51 AM [facility]  Subject: labs    Tamiko,  Your labs show your iron has improved. You can decrease it to 3 times per week or every other day.    Your thyroid is off which likely explains your fatigue. Have you missed many/any doses in the past 3 months?    Your kidney and liver tests look good as well as your electrolytes and blood sugar. Your complete blood count shows no anemia or abnormal white blood cells.    Dr. Duran   normal...

## 2022-12-27 NOTE — PROGRESS NOTES
"SUBJECTIVE:    Chief Complaint   Patient presents with    Chest Pain     Under right ribs since March          Tamiko Tiwari is a 47 y.o. old female who presents for evaluation of right anterior rib pain.  The pain has been present since March.  She underwent evaluation which included ultrasound and HIDA scan which is abnormal and she underwent cholecystectomy.  Initially she felt the cholecystectomy helped with her pain, but now the pain is back.  She reports no injury to the area preceding onset of pain.  She does experience the pain daily.  She never has any night pain and the pain does not interfere with her normal activities.  She has no associated shortness of breath, chest pain, nausea, vomiting or abdominal pain.  She does report some fatigue and would like to do some lab work for that today.  The pain has not progressed since March and is at the same level since it started.  She can reproduce the pain with palpation of the rib area.  No history of shingles.    She also reports significant fatigue.  She is due for follow-up of her thyroid disease.  She is currently taking levothyroxine 137 mcg daily.      The patient's past medical history, medications,  allergies and social history were reviewed in her electronic medical record at today's visit.    Review of Systems -   See HPI      OBJECTIVE:  /84 (BP Location: Left arm, Patient Position: Sitting, Cuff Size: Regular Adult)   Pulse 72   Temp 36.2 °C (97.1 °F) (Temporal)   Ht 1.702 m (5' 7\")   Wt 79.4 kg (175 lb)   LMP 10/23/2021   SpO2 97%   BMI 27.41 kg/m²   General appearance: alert, no distress.  Neck exam - supple, no adenopathy.  Thyroid- palpates normal, no nodules.  Chest: clear throughout.   CVS exam: normal rate, regular rhythm, carotids without bruits.  I am able to reproduce the rib pain with palpation of the ribs starting at the right costochondral junction and moving into the lower rib area.  Abdominal exam: soft, non-tender.  " No hepatosplenomegaly or mass noted.  Skin exam - no rashes noted; no jaundice.    LABS:  Recent Results (from the past 48 hour(s))   CBC w/auto differential Blood, Venous    Collection Time: 12/27/22  7:25 AM   Result Value Ref Range    WBC 7.4 4.5 - 10.5 10*3/uL    RBC 4.84 3.70 - 5.30 10*6/µL    Hemoglobin 15.2 11.5 - 15.5 g/dL    Hematocrit 44.0 34.0 - 45.0 %    MCV 90.9 81.0 - 97.0 fL    MCH 31.4 28.0 - 33.0 pg    MCHC 34.5 32.0 - 36.0 g/dL    RDW 13.4 11.5 - 14.0 %    Platelets 277 140 - 350 10*3/uL    MPV 8.0 6.9 - 10.8 fL    Neutrophils% 47.0 41.0 - 81.0 %    Lymphocytes% 37.1 11.0 - 47.0 %    Monocytes% 8.2 3.0 - 11.0 %    Eosinophils% 6.6 (H) 0.0 - 3.0 %    Basophils% 1.1 0.0 - 2.0 %    ANC (auto diff) 3.50 10*3/UL    Lymphocytes Absolute 2.80 10*3/uL    Monocytes Absolute 0.60 10*3/uL    Eosinophils Absolute 0.50 10*3/uL    Basophils Absolute 0.10 10*3/uL   Comprehensive metabolic panel Blood, Venous    Collection Time: 12/27/22  7:25 AM   Result Value Ref Range    Sodium 137 135 - 145 mmol/L    Potassium 4.7 3.5 - 5.1 MMOL/L    Chloride 102 98 - 107 mmol/L    CO2 27 21 - 32 mmol/L    Anion Gap 8 3 - 11 mmol/L    BUN 19 7 - 25 mg/dL    Creatinine 0.77 0.60 - 1.10 mg/dL    Glucose 91 70 - 105 mg/dL    Calcium 9.5 8.6 - 10.3 mg/dL    AST 16 <40 U/L    ALT (SGPT) 15 7 - 52 U/L    Alkaline Phosphatase 106 (H) 39 - 100 U/L    Total Protein 7.3 6.0 - 8.3 g/dL    Albumin 4.2 3.5 - 5.3 g/dL    Total Bilirubin 0.38 0.20 - 1.40 mg/dL    Corrected Calcium 9.3 8.6 - 10.3 mg/dL    eGFR 95 >60 mL/min/1.73m*2   Thyroid Stimulating Hormone, Ultrasensitive Blood, Venous    Collection Time: 12/27/22  7:25 AM   Result Value Ref Range    TSH 13.588 (H) 0.340 - 4.820 uIU/ml   Vitamin B12 Blood, Venous    Collection Time: 12/27/22  7:25 AM   Result Value Ref Range    Vitamin B-12 270 180 - 914 pg/mL   Iron and TIBC Blood, Venous    Collection Time: 12/27/22  7:25 AM   Result Value Ref Range    Iron 83 50 - 175 ug/dL    TIBC  341 250 - 450 ug/dL    Iron Saturation 24 13 - 50 %    UIBC 258 155 - 355 ug/dL   Ferritin Blood, Venous    Collection Time: 12/27/22  7:25 AM   Result Value Ref Range    Ferritin 93.0 11.0 - 307.0 ng/mL         DIAGNOSIS   Diagnosis Plan   1. Other fatigue  CBC w/auto differential Blood, Venous    Comprehensive metabolic panel Blood, Venous    Iron panel Blood, Venous    Thyroid Stimulating Hormone, Ultrasensitive Blood, Venous    Vitamin B12 Blood, Venous      2. Rib pain on right side  celecoxib (CeleBREX) 200 mg capsule      3. Iron deficiency        4. Acquired hypothyroidism              PLAN:  The patient presents with persistent rib pain.  Etiology is not entirely clear, but pain is reproducible with palpation of the costochondral junction.  I have recommended a trial of anti-inflammatories using Celebrex 200 mg twice daily for 6 weeks and she will report back to me how she is doing at that time.  If she has escalation of pain or if her symptoms just do not seem to improve, consider CT chest with contrast for further evaluation.    Her TSH is elevated at 13.58.  Levothyroxine is increased to 150 mcg daily and she will recheck in 3 months.  Her B12 is also noted to be low.  Start vitamin B12 500 mcg daily.    Iron panel shows improvement of her iron deficiency.  She may reduce her iron and vitamin C to 3 times weekly.  Some of her thyroid issue may be because she was taking the thyroid with the iron.    Jacinda Duran MD

## 2022-12-29 ENCOUNTER — CLINICAL SUPPORT (OUTPATIENT)
Dept: OTOLARYNGOLOGY | Facility: CLINIC | Age: 47
End: 2022-12-29
Payer: COMMERCIAL

## 2022-12-29 ENCOUNTER — OFFICE VISIT NO LOS (OUTPATIENT)
Dept: AUDIOLOGY | Facility: CLINIC | Age: 47
End: 2022-12-29
Payer: COMMERCIAL

## 2022-12-29 VITALS — HEART RATE: 84 BPM | RESPIRATION RATE: 16 BRPM | OXYGEN SATURATION: 97 % | TEMPERATURE: 97.3 F

## 2022-12-29 DIAGNOSIS — Z46.1 HEARING AID FITTING OR ADJUSTMENT: Primary | ICD-10-CM

## 2022-12-29 DIAGNOSIS — T80.90XA INJECTION SITE REACTION, INITIAL ENCOUNTER: Primary | ICD-10-CM

## 2022-12-29 PROCEDURE — V5014 HEARING AID REPAIR/MODIFYING: HCPCS | Mod: NC | Performed by: AUDIOLOGIST

## 2022-12-29 ASSESSMENT — PAIN SCALES - GENERAL: PAINLEVEL: 0-NO PAIN

## 2022-12-29 NOTE — PROGRESS NOTES
EARMOLDS FIT    Tamiko Tiwari was seen on 12/29/2022 to fit her new earmolds at [facility].  Ms. Tiwari was successful with insertion and removal of her new AP earmolds.        Brand: Phonak  Right Earmold SN: [id number]  Left Earmold SN: [id number]    No Charge.

## 2023-01-03 ENCOUNTER — HOSPITAL ENCOUNTER (OUTPATIENT)
Dept: ULTRASOUND IMAGING | Facility: HOSPITAL | Age: 48
Discharge: HOME | End: 2023-01-03
Payer: COMMERCIAL

## 2023-01-03 DIAGNOSIS — N83.299 COMPLEX OVARIAN CYST: ICD-10-CM

## 2023-01-03 PROCEDURE — 76830 TRANSVAGINAL US NON-OB: CPT

## 2023-01-04 ENCOUNTER — OFFICE VISIT (OUTPATIENT)
Dept: ONCOLOGY | Facility: CLINIC | Age: 48
End: 2023-01-04
Payer: COMMERCIAL

## 2023-01-04 VITALS
WEIGHT: 183 LBS | BODY MASS INDEX: 28.72 KG/M2 | DIASTOLIC BLOOD PRESSURE: 87 MMHG | TEMPERATURE: 98.9 F | SYSTOLIC BLOOD PRESSURE: 141 MMHG | HEART RATE: 84 BPM | HEIGHT: 67 IN | OXYGEN SATURATION: 96 %

## 2023-01-04 DIAGNOSIS — D39.10 BORDERLINE EPITHELIAL NEOPLASM OF OVARY: ICD-10-CM

## 2023-01-04 PROCEDURE — G0463 HOSPITAL OUTPT CLINIC VISIT: HCPCS

## 2023-01-04 PROCEDURE — 99212 OFFICE O/P EST SF 10 MIN: CPT | Performed by: OBSTETRICS & GYNECOLOGY

## 2023-01-04 ASSESSMENT — ENCOUNTER SYMPTOMS
FEVER: 0
NAUSEA: 0
VOMITING: 0
ABDOMINAL DISTENTION: 0
CONSTIPATION: 0
CHILLS: 0
DIARRHEA: 1
CHEST TIGHTNESS: 0
HOT FLASHES: 0
SHORTNESS OF BREATH: 0
DIFFICULTY URINATING: 0

## 2023-01-04 ASSESSMENT — PAIN SCALES - GENERAL: PAINLEVEL: 0-NO PAIN

## 2023-01-04 NOTE — ASSESSMENT & PLAN NOTE
· No evidence of recurrence in the remaining ovary  · Will RTC in 1 year for surveillance with repeat ultrasound  · Without menopausal symptoms.

## 2023-01-04 NOTE — PROGRESS NOTES
[facility]  Gynecologic Oncology  Surveillance Visit    Chief Complaint: Surveillance Visit    History of Present Illness:   Tamiko Tiwari is a 47 y.o.  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  She has done well the past year aside from xiphoid pain that radiates to the right side. This started in march and hasn't improved. She had her gallbladder out, which did not improve the pain. She is now on celebrex to see if it makes a difference. She has had some diarrhea which is within the norm for her. No vaginal dryness, hot flashes or night sweats.     Review of Systems   Review of Systems   Constitutional:  Negative for chills and fever.   Respiratory:  Negative for chest tightness and shortness of breath.    Cardiovascular:         Xiphoid pain   Gastrointestinal:  Positive for diarrhea. Negative for abdominal distention, constipation, nausea and vomiting.   Endocrine: Negative for hot flashes.   Genitourinary:  Negative for difficulty urinating, dyspareunia, vaginal bleeding and vaginal discharge.    All other systems reviewed and are negative.    Past Medical, Surgical and Family History were reviewed.    Current Medications:    Current Outpatient Medications:     celecoxib (CeleBREX) 200 mg capsule, Take 1 capsule (200 mg total) by mouth 2 (two) times a day, Disp: 180 capsule, Rfl: 3    levothyroxine (SYNTHROID, LEVOTHROID) 150 mcg tablet, Take 1 tablet (150 mcg total) by mouth daily, Disp: 30 tablet, Rfl: 11    sertraline (ZOLOFT) 100 mg tablet, TAKE 1 TABLET(100 MG) BY MOUTH DAILY, Disp: 90 tablet, Rfl: 3    dupilumab (Dupixent Pen) 300 mg/2 mL pen injector, Inject 300 mg under the skin every 14 (fourteen) days, Disp: 12 mL, Rfl: 3    ferrous sulfate 325 mg (65 mg iron) tablet, Take 325 mg by mouth daily, Disp: , Rfl:     losartan (COZAAR) 50 mg tablet, TAKE 1 TABLET(50 MG) BY MOUTH DAILY, Disp: 90  "tablet, Rfl: 3    cholecalciferol, vitamin D3, 125 mcg (5,000 unit) tablet, Take 1 tablet (5,000 Units total) by mouth daily, Disp: , Rfl:     spironolactone (ALDACTONE) 50 mg tablet, Take 1 tablet (50 mg total) by mouth daily, Disp: 90 tablet, Rfl: 3    albuterol HFA (PROVENTIL HFA;VENTOLIN HFA) 90 mcg/actuation inhaler, Inhale 2 puffs every 2 (two) hours as needed for wheezing (every 2-3 hours as needed for cough/wheezing), Disp: 1 Inhaler, Rfl: 1     Allergies:  The patient is allergic to adhesive tape-silicones.    Social History:  Social History     Tobacco Use    Smoking status: Never    Smokeless tobacco: Never   Vaping Use    Vaping Use: Never used   Substance Use Topics    Alcohol use: Yes     Alcohol/week: 0.0 standard drinks     Comment: occ    Drug use: No       OB/GYN History: 1 SAB, 1 C/S, no h/o abnormal pap smears    Physical Exam:  Vitals:    01/04/23 1539   BP: 141/87   Temp: 37.2 °C (98.9 °F)   TempSrc: Temporal   Pulse: 84   SpO2: 96%   Height: 1.702 m (5' 7\")   Weight: 83 kg (183 lb)   PainSc: 0-No pain   Patient Position: Sitting     Weights (Current Encounter Only) (last 180 days)       Date/Time Weight    01/04/23 1539 83 kg (183 lb)          ECOG Performance Status: 0  Physical Exam  Vitals reviewed.   Constitutional:       Appearance: Normal appearance.   Cardiovascular:      Rate and Rhythm: Normal rate and regular rhythm.      Heart sounds: No murmur heard.    No friction rub. No gallop.   Pulmonary:      Effort: Pulmonary effort is normal.      Breath sounds: No wheezing, rhonchi or rales.   Abdominal:      Comments: Soft, nontender non distended. Incisions are well healed and laparoscopic adriano incisions are healing well. There are no hernias. Tenderness at xiphoid process   Genitourinary:     Comments: NEFG, vagina is somewhat atrophic. No palpable masses  Neurological:      General: No focal deficit present.      Mental Status: She is alert and oriented to person, place, and time. "   Psychiatric:         Mood and Affect: Mood normal.         Behavior: Behavior normal.         Thought Content: Thought content normal.         Judgment: Judgment normal.       Labs:  Lab Results   Component Value Date    WBC 7.4 12/27/2022    NEUTROABS 3.50 12/27/2022    HGB 15.2 12/27/2022     12/27/2022    K 4.7 12/27/2022    CREATININE 0.77 12/27/2022    BILITOT 0.38 12/27/2022    ALKPHOS 106 (H) 12/27/2022    AST 16 12/27/2022    ALT 15 12/27/2022    CALCIUM 9.5 12/27/2022     Lab Results   Component Value Date     32.6 11/11/2021     Imaging Reports:  11/29/2021 Path Report:  A. Fallopian tube, left, salpingectomy -      - Benign fallopian tube, negative for tumor implants or malignancy.     B. 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      - Benign fallopian tube, negative for tumor implants or malignancy.      - AJCC 8th edition tumor stage: pT1a.      - Additional pathologic findings:      - Cervix with no significant pathologic alteration.      - Attenuated, inactive appearing endometrium with benign endometrial   polyps (x2, 0.7 to 1.5 cm).      - Adenomyosis.     C. Omentum, biopsy -      - Benign fibroadipose tissue, negative for tumor implants or   malignancy.     US Transvaginal:  IMPRESSION:   1.  Surgically absent uterus and right ovary.  2.  Otherwise unremarkable ultrasound.    Assessment:  Tamiko Tiwari is a 47 y.o. 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     Plan:  Borderline epithelial neoplasm of ovary  No evidence of recurrence in the remaining ovary  Will RTC in 1 year for surveillance with repeat ultrasound  Without menopausal symptoms.    All questions answered to patient's apparent satisfaction.    On this date of  service 15 minutes of total time was spent on this encounter.      This note was generated using voice recognition software. Inadvertent word errors may have occurred, which were not recognized during proofreading process.     April Cao MD  [phone]

## 2023-01-30 DIAGNOSIS — I10 ESSENTIAL HYPERTENSION: ICD-10-CM

## 2023-01-30 RX ORDER — SPIRONOLACTONE 50 MG/1
TABLET, FILM COATED ORAL
Qty: 90 TABLET | Refills: 3 | Status: SHIPPED | OUTPATIENT
Start: 2023-01-30 | End: 2024-01-30

## 2023-02-07 ENCOUNTER — PATIENT MESSAGE (OUTPATIENT)
Dept: FAMILY MEDICINE | Facility: CLINIC | Age: 48
End: 2023-02-07
Payer: COMMERCIAL

## 2023-02-07 DIAGNOSIS — R07.81 RIB PAIN ON RIGHT SIDE: Primary | ICD-10-CM

## 2023-02-17 NOTE — TELEPHONE ENCOUNTER
Pt called and states that the dupixent did get approved, she is doing well on the budesonide however will be running out. Per Pamella pt advised to get the dupixent and then can stop the budesonide. Pt has an appt in September for follow up.    Detail Level: Zone Continue Regimen: doxycycline- take one cap po bid\\nAklief- Apply pea size amount to face and back nightly\\Karo-Apply to face, chest and back lather and rinse well. Use every other day about a gram a day

## 2023-03-06 ENCOUNTER — SPECIALTY PHARMACY (OUTPATIENT)
Dept: PHARMACY | Facility: HOSPITAL | Age: 48
End: 2023-03-06
Payer: COMMERCIAL

## 2023-03-06 NOTE — PROGRESS NOTES
Routine tm call for Artemio. Tamiko reports medication is beneficial. Administered most recent injection to abdomen and reports injection site reaction was much smaller. Also less stinging with abdominal injections. Advised is fine to just administer in abdominal sites and reminded to alternate injection sites. Denies any other medication side effects, denies missed doses. Medications reviewed, has started Celebrex, med list updated in [facility] No questions or concerns and prefers next tm call in about 3 months, scheduled. Call closed with reminder to continue infection prevention and call her dr with flare ups or other concerns.

## 2023-03-08 NOTE — TELEPHONE ENCOUNTER
From: Tamiko Tiwrai  To: Jacinda Duran MD  Sent: 2/7/2023 9:49 AM [facility]  Subject: Right rib pain update    Good morning,  I wanted to provide an update after taking the Celebrex for about 6 weeks, I no longer have any pain on my sternum, but I still have discomfort under my right rib cage and when I press my upper right rib cage it is painful. Should I continue with the Celebrex?    Tamiko

## 2023-03-27 ENCOUNTER — HOSPITAL ENCOUNTER (OUTPATIENT)
Dept: CT IMAGING | Facility: HOSPITAL | Age: 48
Discharge: HOME | End: 2023-03-27
Payer: COMMERCIAL

## 2023-03-27 DIAGNOSIS — R07.81 RIB PAIN ON RIGHT SIDE: ICD-10-CM

## 2023-03-27 PROBLEM — R05.9 COUGH: Status: RESOLVED | Noted: 2019-01-25 | Resolved: 2023-03-27

## 2023-03-27 PROBLEM — J32.4 CHRONIC PANSINUSITIS: Status: RESOLVED | Noted: 2021-10-25 | Resolved: 2023-03-27

## 2023-03-27 PROCEDURE — G1004 CDSM NDSC: HCPCS

## 2023-03-28 ENCOUNTER — OFFICE VISIT (OUTPATIENT)
Dept: FAMILY MEDICINE | Facility: CLINIC | Age: 48
End: 2023-03-28
Payer: COMMERCIAL

## 2023-03-28 ENCOUNTER — LAB (OUTPATIENT)
Dept: LAB | Facility: CLINIC | Age: 48
End: 2023-03-28
Payer: COMMERCIAL

## 2023-03-28 VITALS
HEIGHT: 67 IN | OXYGEN SATURATION: 96 % | DIASTOLIC BLOOD PRESSURE: 78 MMHG | BODY MASS INDEX: 29.66 KG/M2 | WEIGHT: 189 LBS | SYSTOLIC BLOOD PRESSURE: 124 MMHG | TEMPERATURE: 97.3 F | HEART RATE: 83 BPM

## 2023-03-28 DIAGNOSIS — Z00.00 WELLNESS EXAMINATION: Primary | ICD-10-CM

## 2023-03-28 DIAGNOSIS — I10 ESSENTIAL HYPERTENSION: ICD-10-CM

## 2023-03-28 DIAGNOSIS — Z12.11 COLON CANCER SCREENING: ICD-10-CM

## 2023-03-28 DIAGNOSIS — E03.9 ACQUIRED HYPOTHYROIDISM: Chronic | ICD-10-CM

## 2023-03-28 DIAGNOSIS — E55.9 VITAMIN D DEFICIENCY: Chronic | ICD-10-CM

## 2023-03-28 LAB
25(OH)D3 SERPL-MCNC: 44 NG/ML (ref 30–100)
CHOLEST SERPL-MCNC: 204 MG/DL (ref 0–199)
FASTING STATUS PATIENT QL REPORTED: YES
HDLC SERPL-MCNC: 55 MG/DL
LDLC SERPL CALC-MCNC: 117 MG/DL (ref 20–99)
TRIGL SERPL-MCNC: 161 MG/DL
TSH SERPL DL<=0.05 MIU/L-ACNC: 2.66 UIU/ML (ref 0.34–4.82)

## 2023-03-28 PROCEDURE — 84443 ASSAY THYROID STIM HORMONE: CPT | Performed by: FAMILY MEDICINE

## 2023-03-28 PROCEDURE — 80061 LIPID PANEL: CPT | Performed by: FAMILY MEDICINE

## 2023-03-28 PROCEDURE — 82306 VITAMIN D 25 HYDROXY: CPT | Performed by: FAMILY MEDICINE

## 2023-03-28 PROCEDURE — 36415 COLL VENOUS BLD VENIPUNCTURE: CPT | Performed by: FAMILY MEDICINE

## 2023-03-28 PROCEDURE — 99396 PREV VISIT EST AGE 40-64: CPT | Mod: 25 | Performed by: FAMILY MEDICINE

## 2023-03-28 PROCEDURE — 90715 TDAP VACCINE 7 YRS/> IM: CPT | Performed by: FAMILY MEDICINE

## 2023-03-28 PROCEDURE — 90471 IMMUNIZATION ADMIN: CPT | Performed by: FAMILY MEDICINE

## 2023-03-28 RX ORDER — LOSARTAN POTASSIUM 50 MG/1
50 TABLET ORAL DAILY
Qty: 90 TABLET | Refills: 3 | Status: SHIPPED | OUTPATIENT
Start: 2023-03-28 | End: 2024-03-25 | Stop reason: SDUPTHER

## 2023-03-28 ASSESSMENT — PAIN SCALES - GENERAL: PAINLEVEL: 0-NO PAIN

## 2023-03-28 NOTE — PATIENT INSTRUCTIONS
Female Wellness    Adopting a healthy lifestyle and getting preventive care can go a long way to promote health and wellness. Talk with your health care provider about what schedule of regular examinations is right for you. This is a good chance for you to check in with your provider about disease prevention and staying healthy.    In between checkups, there are plenty of things you can do on your own. Experts have done a lot of research about which lifestyle changes and preventive measures are most likely to keep you healthy. Ask your health care provider for more information.    Weight and diet  Eat a healthy diet  Be sure to include plenty of vegetables, fruits, low-fat dairy products, and lean protein.  Do not eat a lot of foods high in solid fats, added sugars, or salt.  Get regular exercise. This is one of the most important things you can do for your health.  Most adults should exercise for at least 150 minutes each week. The exercise should increase your heart rate and make you sweat (moderate-intensity exercise).  Most adults should also do strengthening exercises at least twice a week. This is in addition to the moderate-intensity exercise.    Maintain a healthy weight  Body mass index (BMI) is a measurement that can be used to identify possible weight problems. It estimates body fat based on height and weight. Your health care provider can help determine your BMI and help you achieve or maintain a healthy weight.  For females 20 years of age and older:  A BMI below 18.5 is considered underweight.  A BMI of 18.5 to 24.9 is normal.  A BMI of 25 to 29.9 is considered overweight.  A BMI of 30 and above is considered obese.    Your BMI today was:  Body mass index is 29.6 kg/m².     Watch levels of cholesterol and blood lipids  You should start having your blood tested for lipids and cholesterol at 20 years of age, then have this test every 5 years.  You may need to have your cholesterol levels checked more  often if:  Your lipid or cholesterol levels are high.  You are older than 50 years of age.  You are at high risk for heart disease.    Cancer screening  Lung Cancer  Lung cancer screening is recommended for adults 55-80 years old who are at high risk for lung cancer because of a history of smoking.  A yearly low-dose CT scan of the lungs is recommended for people who:  Currently smoke.  Have quit within the past 15 years.  Have at least a 30-pack-year history of smoking. A pack year is smoking an average of one pack of cigarettes a day for 1 year.  Yearly screening should continue until it has been 15 years since you quit.  Yearly screening should stop if you develop a health problem that would prevent you from having lung cancer treatment.    Breast Cancer  Practice breast self-awareness. This means understanding how your breasts normally appear and feel.  It also means doing regular breast self-exams. Let your health care provider know about any changes, no matter how small.  If you are in your 20s or 30s, you should have a clinical breast exam (CBE) by a health care provider every 1-3 years as part of a regular health exam.  If you are 40 or older, have a CBE every year. Also consider having a breast X-ray (mammogram) every year.  If you have a family history of breast cancer, talk to your health care provider about genetic screening.  If you are at high risk for breast cancer, talk to your health care provider about having an MRI and a mammogram every year.  Breast cancer gene (BRCA) assessment is recommended for women who have family members with BRCA-related cancers. BRCA-related cancers include:  Breast.  Ovarian.  Tubal.  Peritoneal cancers.  Results of the assessment will determine the need for genetic counseling and BRCA1 and BRCA2 testing.    Cervical Cancer  Your health care provider may recommend that you be screened regularly for cancer of the pelvic organs (ovaries, uterus, and vagina). This screening  involves a pelvic examination, including checking for microscopic changes to the surface of your cervix (Pap test). You may be encouraged to have this screening done every 3 years, beginning at age 21.  For women ages 30-65, health care providers may recommend pelvic exams and Pap testing every 3 years, or they may recommend the Pap and pelvic exam, combined with testing for human papilloma virus (HPV), every 5 years. Some types of HPV increase your risk of cervical cancer. Testing for HPV may also be done on women of any age with unclear Pap test results.  Other health care providers may not recommend any screening for nonpregnant women who are considered low risk for pelvic cancer and who do not have symptoms. Ask your health care provider if a screening pelvic exam is right for you.    Colorectal Cancer  This type of cancer can be detected and often prevented.  Routine colorectal cancer screening usually begins at 45 years of age and continues through 75 years of age.  Your health care provider may recommend screening at an earlier age if you have risk factors for colon cancer.  Your health care provider may also recommend using home test kits to check for hidden blood in the stool if you are considered normal or low risk.  These tests are called FIT or Cologuard tests.  A small camera at the end of a tube can be used to examine your colon directly (colonoscopy). This is done to check for the earliest forms of colorectal cancer.  Routine screening usually begins at age 45.  Direct examination of the colon should be repeated every 5-10 years through 75 years of age. However, you may need to be screened more often if early forms of precancerous polyps or small growths are found.    Skin Cancer  Check your skin from head to toe regularly.  Tell your health care provider about any new moles or changes in moles, especially if there is a change in a mole's shape or color.  Also tell your health care provider if you  have a mole that is larger than the size of a pencil eraser.  Always use sunscreen. Apply sunscreen liberally and repeatedly throughout the day.  Protect yourself by wearing long sleeves, pants, a wide-brimmed hat, and sunglasses whenever you are outside.    Heart disease, diabetes, and high blood pressure  High blood pressure causes heart disease and increases the risk of stroke. High blood pressure is more likely to develop in:  People who have blood pressure in the high end of the normal range (130-139/85-89 mm Hg).  People who are overweight or obese.  People who are .  If you are 18-39 years of age, have your blood pressure checked every 3-5 years. If you are 40 years of age or older, have your blood pressure checked every year. You should have your blood pressure measured twice--once when you are at a hospital or clinic, and once when you are not at a hospital or clinic. Record the average of the two measurements. To check your blood pressure when you are not at a hospital or clinic, you can use:  An automated blood pressure machine at a pharmacy.  A home blood pressure monitor.  Have regular diabetes screenings. This involves taking a blood sample to check your fasting blood sugar level.  If you are at a normal weight and have a low risk for diabetes, have this test once every three years after 45 years of age.  If you are overweight and have a high risk for diabetes, consider being tested at a younger age or more often.    Hepatitis C  Blood testing is recommended for:  Everyone born from 1945 through 1965.  Anyone with known risk factors for hepatitis C.    Sexually transmitted infections (STIs)  You should be screened for sexually transmitted infections (STIs) including gonorrhea and chlamydia if:  You are sexually active and are younger than 24 years of age.  You are older than 24 years of age and your health care provider tells you that you are at risk for this type of infection.  Your  sexual activity has changed since you were last screened and you are at an increased risk for chlamydia or gonorrhea. Ask your health care provider if you are at risk.  Pregnancy  If you are premenopausal and you may become pregnant, ask your health care provider about preconception counseling.  If you may become pregnant, take 400 to 800 micrograms (mcg) of folic acid every day.  If you want to prevent pregnancy, talk to your health care provider about birth control (contraception).    Osteoporosis and menopause  Osteoporosis is a disease in which the bones lose minerals and strength with aging. This can result in serious bone fractures. Your risk for osteoporosis can be identified using a bone density scan.  If you are 65 years of age or older, or if you are at risk for osteoporosis and fractures, ask your health care provider if you should be screened.  Ask your health care provider whether you should take a calcium or vitamin D supplement to lower your risk for osteoporosis.      Follow these instructions at home:  Schedule regular health, dental, and eye exams.  Stay current with your immunizations.  Do not use any tobacco products including cigarettes, chewing tobacco, electronic cigarettes or drugs.  If you are pregnant, do not drink alcohol.  If you are breastfeeding, limit how much and how often you drink alcohol.  Limit alcohol intake to no more than 1 drink per day for nonpregnant women. One drink equals 12 ounces of beer, 5 ounces of wine, or 1½ ounces of hard liquor.  Tell your health care provider if you often feel depressed.  Tell your health care provider if you have ever been abused or do not feel safe at home.        Elsevier Interactive Patient Education © 2018 [employer]

## 2023-03-28 NOTE — PROGRESS NOTES
SUBJECTIVE:    Chief Complaint   Patient presents with    Physical         Tamiko Tiwari is a 48 y.o. female presenting for an annual exam.    Patient Active Problem List   Diagnosis    Acquired hypothyroidism    Essential hypertension    Vitamin D deficiency    Sensorineural hearing loss (SNHL) of both ears    Severe persistent asthma with acute exacerbation    Menometrorrhagia    Iron deficiency    Hypertrophy of nasal turbinates    Borderline epithelial neoplasm of ovary    B12 deficiency       Outpatient Medications Prior to Visit   Medication Sig Dispense Refill    spironolactone (ALDACTONE) 50 mg tablet TAKE 1 TABLET BY MOUTH DAILY 90 tablet 3    celecoxib (CeleBREX) 200 mg capsule Take 1 capsule (200 mg total) by mouth 2 (two) times a day 180 capsule 3    levothyroxine (SYNTHROID, LEVOTHROID) 150 mcg tablet Take 1 tablet (150 mcg total) by mouth daily 30 tablet 11    sertraline (ZOLOFT) 100 mg tablet TAKE 1 TABLET(100 MG) BY MOUTH DAILY 90 tablet 3    dupilumab (Dupixent Pen) 300 mg/2 mL pen injector Inject 300 mg under the skin every 14 (fourteen) days 12 mL 3    ferrous sulfate 325 mg (65 mg iron) tablet Take 325 mg by mouth daily      cholecalciferol, vitamin D3, 125 mcg (5,000 unit) tablet Take 1 tablet (5,000 Units total) by mouth daily      albuterol HFA (PROVENTIL HFA;VENTOLIN HFA) 90 mcg/actuation inhaler Inhale 2 puffs every 2 (two) hours as needed for wheezing (every 2-3 hours as needed for cough/wheezing) 1 Inhaler 1    losartan (COZAAR) 50 mg tablet TAKE 1 TABLET(50 MG) BY MOUTH DAILY 90 tablet 3     No facility-administered medications prior to visit.       Family Status   Relation Name Status    Mother Izabela Alive, age 77y    Father Rudy Alive, age 78y    Sister Macy Alive, age 55y    Sister Lucia Alive, age 51y    Brother Vinnie Alive, age 53y    Maternal ARLETH Yuan  at age 60s    Maternal GF Henrik  at age 80s    Paternal ARLETH Thomas Alive, age 98y    Paternal DARLENE Grant  at  age 90s    Son  Alive    Mat Aunt Pat (Not Specified)    Mat Uncle  (Not Specified)    Mat Uncle Shaggy (Not Specified)    Pat Uncle  (Not Specified)    Neg Hx  (Not Specified)       Family History   Problem Relation Age of Onset    Hypertension Mother     Prostate cancer Father     Other Sister         Hysterectomy due to benign tumors     Tachycardia Sister     Hypertension Brother     Heart failure Maternal Grandmother     Alcohol abuse Maternal Grandfather     Pacemaker/AICD Paternal Grandmother     Alcohol abuse Paternal Grandfather     Asthma Son     Eczema Son     Cancer Mother's Sister         unsure of what type    Other Mother's Brother     Stroke Mother's Brother     Multiple sclerosis Father's Brother     Ovarian cancer Neg Hx     Pancreatic cancer Neg Hx     Melanoma Neg Hx     Breast cancer Neg Hx     Uterine cancer Neg Hx          The patient's past medical history, medications, allergies, family history and social history were reviewed in her electronic medical record at today's visit.      REVIEW OF SYSTEMS:  Constitutional: Weight up 15 pounds  HEENT:  No change in vision or hearing.  Wears hearing aides.   Pulmonary: No dyspnea on exertion, no wheezing, no shortness of breath  Cardiovascular: No exercise intolerance, no chest pain, no diaphoresis, no edema  Gastrointestinal: No abdominal pain, no change in bowel habits, no significant GERD.  Occasional loose stools  :  The patient denies frequent UTI, nocturia, frequency or incomplete voiding.  No dyspareunia. Menses:  absent due to hysterectomy  Skin/Integumentary: No abnormal skin lesions noted.  Neurologic: No chronic headaches.  Psychiatric: Denies depression or anxiety.  Stable on current RX  Musculoskeletal:  no joint pain or swelling.      OBJECTIVE:   The patient appears well, in NAD. Mood and affect appropriate.  /78 (BP Location: Left arm, Patient Position: Sitting, Cuff Size: Regular Adult)   Pulse 83   Temp 36.3 °C (97.3 °F)  "(Temporal)   Ht 1.702 m (5' 7\")   Wt 85.7 kg (189 lb)   LMP 10/23/2021   SpO2 96%   BMI 29.60 kg/m²    HEENT: Eyes without gross abnormality; Ears normal; TM's are clear. Throat and pharynx normal. Teeth in good repair.  Neck supple. No adenopathy or thyromegaly.  Lungs: Clear, good air entry, no wheezes, rhonchi or rales.   Heart: S1 and S2 normal, no murmurs, regular rate and rhythm. Carotids are without bruits bilaterally.  Breasts:  Symmetrical, without mass,nipple discharge or axillary adenopathy.  Abdomen: Soft without tenderness, guarding, mass or hepatosplenomegaly.  Extremities: No edema, no varicosities. Distal pulses intact.  Skin: I note only benign skin findings. No unusual rashes or suspicious skin lesions noted. Nails appear normal.        Labs:    Recent Results (from the past 48 hour(s))   Lipid panel Blood, Venous    Collection Time: 03/28/23  7:38 AM   Result Value Ref Range    Triglycerides 161 (H) <=149 mg/dL    Cholesterol 204 (H) 0 - 199 mg/dL    HDL 55 >=40 mg/dL    LDL Calculated 117 (H) 20 - 99 mg/dL    Fasting? Yes    Vitamin D 25 hydroxy Blood, Venous    Collection Time: 03/28/23  7:38 AM   Result Value Ref Range    Vit D, 25-Hydroxy 44 30 - 100 ng/mL   Thyroid Stimulating Hormone, Ultrasensitive Blood, Venous    Collection Time: 03/28/23  7:38 AM   Result Value Ref Range    TSH 2.659 0.340 - 4.820 uIU/ml        ASSESSMENT:    Diagnosis Plan   1. Wellness examination  Lipid panel Blood, Venous      2. Essential hypertension  losartan (COZAAR) 50 mg tablet      3. Colon cancer screening  Ambulatory referral to Gastroenterology      4. Vitamin D deficiency  Vitamin D 25 hydroxy Blood, Venous           PLAN:   The patient was provided written information regarding healthcare maintenance in today's AVS.  This includes diet, exercise, vaccinations and age appropriate cancer screenings.      Health Maintenance   Vaccines:  reviewed and up-to-date with exception of COVID " booster.  Diet/Exercise:  Body mass index is 29.6 kg/m²..  The patient was provided written materials regarding healthy BMI and exercise recommendations.  Colon Cancer Screening: Referral for colonoscopy has been made.  Breast Cancer Screening:  completed.  Cervical Cancer Screening up to date: Does not apply, hysterectomy  Cholesterol Screening:  results reviewed  Diabetes Screening:  results reviewed  Lung Cancer Screening:  not applicable  Smoking Cessation:  N/A  Hepatitis C Screening:   N/A  Depression Screening:  see ROS    Return in about 1 year (around 3/28/2024) for 30 MIN- PHYSICAL.    Jacinda Duran MD

## 2023-04-13 ENCOUNTER — OFFICE VISIT NO LOS (OUTPATIENT)
Dept: AUDIOLOGY | Facility: CLINIC | Age: 48
End: 2023-04-13
Payer: COMMERCIAL

## 2023-04-13 DIAGNOSIS — Z46.1 HEARING AID FITTING OR ADJUSTMENT: Primary | ICD-10-CM

## 2023-04-13 PROCEDURE — V5014 HEARING AID REPAIR/MODIFYING: HCPCS | Mod: NC | Performed by: AUDIOLOGIST

## 2023-04-13 NOTE — PROGRESS NOTES
Hearing Aid Adjustment    Tamiko Tiwari was seen on 4/13/2023 for a hearing aid adjustment at [facility].      Subjective   Patient reports that she would like the hearing aids turned up.      Objective / Assessment   Cleaned hearing aids    -  vacuumed microphones    Listening check revealed hearing aids amplifying well and in working order.  Connected hearing aids to the computer. Adjusted settings by turning off sound recover. Increased gain to 110% of targets. This really does not make a significant change in gain because of the feedback control dipping into the gain potential. Re-ran feedback control with no change. Discussed options with patient. Will order new receivers with a UP  instead of the current P reciever she currently has (using previous SN).     Plan  Ms. Tiwari should return to the clinic when new receivers are in.           No Charge today.

## 2023-04-28 ENCOUNTER — OFFICE VISIT NO LOS (OUTPATIENT)
Dept: AUDIOLOGY | Facility: CLINIC | Age: 48
End: 2023-04-28
Payer: COMMERCIAL

## 2023-04-28 DIAGNOSIS — Z46.1 HEARING AID FITTING OR ADJUSTMENT: Primary | ICD-10-CM

## 2023-04-28 PROCEDURE — V5264 EAR MOLD/INSERT: HCPCS | Performed by: AUDIOLOGIST

## 2023-04-28 PROCEDURE — V5014 HEARING AID REPAIR/MODIFYING: HCPCS | Mod: NC | Performed by: AUDIOLOGIST

## 2023-05-13 NOTE — PROGRESS NOTES
Hearing Aid Adjustment    Tamiko Tiwari was seen on 4/28/2023 for a hearing aid adjustment at [facility].      Subjective   Patient new encased UP ear molds are in - SN: [id number] (right) / [id number] (left)    Objective / Assessment   Connected hearing aids to the computer. Re-fit hearing aids with new encased ear molds.     Discussed with Mrs. Tiwari that she is in the most powerful  - she will try these settings and see if this will help improve her clarity with speech. If she continues to have difficulty she may need to consider use of a remote microphone for situational use or a cochlear implant evaluation.     Plan  Ms. Tiwari should return to the clinic as needed.          Charge $207.00 for new encased ear molds.

## 2023-07-06 NOTE — PROGRESS NOTES
Tamiko presented to the pharmacy for initial counseling prior to Dupixent therapy for treatment of nasal polyps.  Tamiko was diagnosed with polyps and had them removed in November of 2021, but during a recent follow up with ENT found that they had begun to grow back.  Her primary symptoms are sinus pressure, sinus congestion, and recurrent sinus infections.  She has never given herself injections but is confident that she will be able to administer Dupixent.  Printed information regarding subcutaneous administration of Dupixent provided and discussed, including injection site selection, preparation, and administration.  Tamiko verbalizes understanding and demonstrates understanding of injection delivery device using practice pen.  She administered the first dose of Dupixent into her left abdomen, site asymptomatic following injection and remained as such for duration of visit.  Tamiko remained in the pharmacy for about 10-15 minutes following injection, no complaints or concerns verbalized.  Will follow up with Tamiko within one week, then regular TM thereafter.   
Patient

## 2023-07-11 ENCOUNTER — OFFICE VISIT NO LOS (OUTPATIENT)
Dept: AUDIOLOGY | Facility: CLINIC | Age: 48
End: 2023-07-11
Payer: COMMERCIAL

## 2023-07-11 DIAGNOSIS — Z46.1 HEARING AID FITTING OR ADJUSTMENT: Primary | ICD-10-CM

## 2023-07-11 PROCEDURE — V5014 HEARING AID REPAIR/MODIFYING: HCPCS | Mod: NC | Performed by: AUDIOLOGIST

## 2023-07-11 NOTE — PROGRESS NOTES
Hearing Aid Adjustment    Tamiko Tiwari was seen on 7/11/2023 for a hearing aid adjustment at [facility].      Subjective   Patient reports that speech clarity is still difficult. She reports high listening effort in meetings and gets anxious about missing out on information.      Objective / Assessment   Cleaned hearing aids.   Dried hearing is in ReDux machine - was able to pull out 1.5 ul from the left aid and 0.6 ul from the right aid.   Connected hearing aids to the computer.      Hearing aids were verified using on ear speech measures with NAL-NL2 targets and measured RECDs. Hearing aids were very close to target, some small fine tuning change were made.     Discussed maximizing the use of streaming capabilities of the hearing aids for meetings on teams. Also discussed use of remote hugo for streaming meetings off the computer and for use at in person meetings to increase signal to noise ratio.     Plan  Ms. Tiwari should return to the clinic as needed - will mail remote hugo to her for trail. She was encouraged to call or MyChart message with questions or concerns.          No Charge today.

## 2023-07-20 ENCOUNTER — DOCUMENTATION (OUTPATIENT)
Dept: AUDIOLOGY | Facility: CLINIC | Age: 48
End: 2023-07-20
Payer: COMMERCIAL

## 2023-07-20 NOTE — PROGRESS NOTES
I mailed the [employer] Partner hugo to the address on file.  She will be charged $471.00 for the accessory.  This will be billed at a later date.    SN: [id number]  Warranty: 10/10/2024

## 2023-07-27 DIAGNOSIS — J33.9 NASAL POLYP: ICD-10-CM

## 2023-07-27 DIAGNOSIS — J32.4 CHRONIC PANSINUSITIS: ICD-10-CM

## 2023-07-27 RX ORDER — DUPILUMAB 300 MG/2ML
INJECTION, SOLUTION SUBCUTANEOUS
Qty: 12 ML | Refills: 3 | Status: SHIPPED | OUTPATIENT
Start: 2023-07-27 | End: 2023-07-27

## 2023-08-16 ENCOUNTER — OFFICE VISIT NO LOS (OUTPATIENT)
Dept: AUDIOLOGY | Facility: CLINIC | Age: 48
End: 2023-08-16
Payer: COMMERCIAL

## 2023-08-16 DIAGNOSIS — H90.3 SENSORINEURAL HEARING LOSS (SNHL) OF BOTH EARS: Primary | ICD-10-CM

## 2023-08-16 PROCEDURE — 92557 COMPREHENSIVE HEARING TEST: CPT | Performed by: AUDIOLOGIST

## 2023-08-17 NOTE — PROGRESS NOTES
ADULT HEARING EVALUATION REPORT      HISTORY  Tamiko Tiwari was seen at [facility] on 8/16/2023, for a hearing evaluation.  Ms. Tiwari has a previously diagnosed mild sloping to severe sensorineural hearing loss and is here for annual testing to monitor this hearing loss.        OTOSCOPY  Otoscopy revealed clear ear canals and visualization of tympanic membranes bilaterally.     AUDIOLOGICAL RESULTS  Audiometric results revealed a mild sensorineural  hearing loss at 250 Hz, sloping to a moderate sensorineural  hearing loss from 500 - 1000Hz, a moderately-severe sensorineural  hearing loss at 2000 Hz, and severe to profound from 4302-6661 Hz,  bilaterally.     Speech recognition thresholds (SRT) were obtained at 55 dB HL in the right ear and at 55 dB HL in the left ear. SRTs are in excellent agreement with pure tone findings, bilaterally.     Word recognition ability, when tested at a comfortable listening level, is good bilaterally.   Right: 84% at a presentation level of 85 dB HL [50 dB masking] / Left: 60% at a presentation level of 85 dB HL [50 dB masking] , 80% at a presentation level of 90 dB HL [55 dB masking]       IMPRESSIONS  Results reveal hearing loss has shifted slightly from 5643-3406 Hz in the right ear, hearing loss remains stable in the left ear, when compared to previous testing from 8/2022. Word recognition in the left was reduced at 85 dB, but did recover when 5 dB of volume was provided.     This type and degree of hearing loss can make it challenging to understand speech with clarity making communication increasingly difficult. Good communication strategies are important for this type and degree of hearing loss. These results suggest Ms. Tiwari remains a good candidate for amplification at this time.     Patient is also trailing a remote microphone and will let us know if that is something she finds helpful.     RECOMMENDATIONS  Audiometric findings were reviewed with  Ms. Tiwari, and the following recommendations are made:    Continued daily use of bilateral hearing aids.  Annual audiometric evaluation to monitor hearing sensitivity.  Stringent use of hearing protection when exposed to excessive sound levels.

## 2023-08-18 ENCOUNTER — SPECIALTY PHARMACY (OUTPATIENT)
Dept: PHARMACY | Facility: HOSPITAL | Age: 48
End: 2023-08-18
Payer: COMMERCIAL

## 2023-08-18 NOTE — PROGRESS NOTES
Was able to speak with Tamiko face to face when she came in pharmacy to  her refill on 8/11.  She reports that she has not missed any doses since we spoke last and denies adverse effects of medication.  She states that she is no longer experiencing any redness or itching at her injection sites.  She has not been back to see ENT for re-evaluation since November of last year and acknowledges that she should call to set up follow up appointment.  She has no questions or concerns presently.  Medication list reviewed and updated.  We discussed that we can only dispense one month supply of medication instead of three due to insurance change.  She acknowledges, discussed options for pick-up and delivery.  Will continue to follow up with Tamiko at least quarterly or more frequently as needed.

## 2023-10-12 DIAGNOSIS — G47.00 INSOMNIA, UNSPECIFIED TYPE: ICD-10-CM

## 2023-10-12 RX ORDER — SERTRALINE HYDROCHLORIDE 100 MG/1
100 TABLET, FILM COATED ORAL DAILY
Qty: 90 TABLET | Refills: 1 | Status: SHIPPED | OUTPATIENT
Start: 2023-10-12 | End: 2024-03-25 | Stop reason: SDUPTHER

## 2023-10-12 NOTE — TELEPHONE ENCOUNTER
Care Due:                  Date            Visit Type   Department     Provider  --------------------------------------------------------------------------------                                           [facility]  Last Visit: 03-      PHYSICAL     MEDICINE       [place] RAYGOZA  Next Visit: None Scheduled  None         None Found                                                            Last  Test          Frequency    Reason                     Performed    Due Date  --------------------------------------------------------------------------------  Office Visit  6 months...  spironolactone...........  03- 09-    BMP.........  6 months...  spironolactone...........  Not Found    Overdue    [employer] Embedded Care Due Messages. Reference number: [id number]. 10/12/2023 2:20:11 PM MIKO

## 2023-12-20 DIAGNOSIS — E03.9 ACQUIRED HYPOTHYROIDISM: Chronic | ICD-10-CM

## 2023-12-20 RX ORDER — LEVOTHYROXINE SODIUM 150 UG/1
150 TABLET ORAL DAILY
Qty: 90 TABLET | Refills: 1 | Status: SHIPPED | OUTPATIENT
Start: 2023-12-20 | End: 2024-06-21

## 2023-12-20 NOTE — TELEPHONE ENCOUNTER
Care Due:                  Date            Visit Type   Department     Provider  --------------------------------------------------------------------------------                                           [facility]  Last Visit: 03-      PHYSICAL     MEDICINE       [facility] RAYGOZA  Next Visit: None Scheduled  None         None Found                                                            Last  Test          Frequency    Reason                     Performed    Due Date  --------------------------------------------------------------------------------  Office Visit  6 months...  spironolactone...........  03- 09-    BMP.........  6 months...  celecoxib,                 Not Found    Overdue                             cholecalciferol,,                             losartan, spironolactone.    HGB.........  12 months..  celecoxib................  12- 12-    [facility] Embedded Care Due Messages. Reference number: [id number]. 12/20/2023 9:34:45 AM MST

## 2024-01-10 ENCOUNTER — HOSPITAL ENCOUNTER (OUTPATIENT)
Dept: ULTRASOUND IMAGING | Facility: HOSPITAL | Age: 49
Discharge: HOME | End: 2024-01-10
Payer: COMMERCIAL

## 2024-01-10 ENCOUNTER — OFFICE VISIT (OUTPATIENT)
Dept: ONCOLOGY | Facility: CLINIC | Age: 49
End: 2024-01-10
Payer: COMMERCIAL

## 2024-01-10 VITALS
HEIGHT: 67 IN | SYSTOLIC BLOOD PRESSURE: 143 MMHG | HEART RATE: 94 BPM | WEIGHT: 185 LBS | OXYGEN SATURATION: 94 % | TEMPERATURE: 99 F | BODY MASS INDEX: 29.03 KG/M2 | DIASTOLIC BLOOD PRESSURE: 90 MMHG

## 2024-01-10 DIAGNOSIS — D39.10 BORDERLINE EPITHELIAL NEOPLASM OF OVARY: ICD-10-CM

## 2024-01-10 PROCEDURE — G0463 HOSPITAL OUTPT CLINIC VISIT: HCPCS

## 2024-01-10 PROCEDURE — 76856 US EXAM PELVIC COMPLETE: CPT

## 2024-01-10 PROCEDURE — 99213 OFFICE O/P EST LOW 20 MIN: CPT | Performed by: OBSTETRICS & GYNECOLOGY

## 2024-01-10 ASSESSMENT — ENCOUNTER SYMPTOMS
CHEST TIGHTNESS: 0
DIFFICULTY URINATING: 0
ABDOMINAL DISTENTION: 0
CHILLS: 0
FEVER: 0
CONSTIPATION: 0
DIARRHEA: 1
SHORTNESS OF BREATH: 0
HOT FLASHES: 0
NAUSEA: 0
VOMITING: 0

## 2024-01-10 ASSESSMENT — PAIN SCALES - GENERAL: PAINLEVEL: 0-NO PAIN

## 2024-01-10 NOTE — PROGRESS NOTES
[facility]  Gynecologic Oncology  Surveillance Visit    Chief Complaint: Surveillance Visit    History of Present Illness:   Tamiko Tiwari is a 48 y.o.  who underwent a [facility] right salpingooophorectomy and left salpingectomy on 2021.     She is been doing well.  She recently has had some diarrhea but she attributes this to all of her cough medications that she has been taking recently after she was sick.  This is slowly getting normal iced.  She is not having any hot flashes.  She feels well and has no abdominal bloating or any other complaints.  She is relieved that her ultrasound is stable.    Review of Systems   Review of Systems   Constitutional:  Negative for chills and fever.   Respiratory:  Negative for chest tightness and shortness of breath.    Gastrointestinal:  Positive for diarrhea. Negative for abdominal distention, constipation, nausea and vomiting.   Endocrine: Negative for hot flashes.   Genitourinary:  Negative for difficulty urinating, dyspareunia, vaginal bleeding and vaginal discharge.    All other systems reviewed and are negative.    Past Medical, Surgical and Family History were reviewed.    Current Medications:    Current Outpatient Medications:     levothyroxine (SYNTHROID, LEVOTHROID) 150 mcg tablet, Take 1 tablet (150 mcg total) by mouth daily, Disp: 90 tablet, Rfl: 1    sertraline (ZOLOFT) 100 mg tablet, TAKE 1 TABLET BY MOUTH EVERY DAY, Disp: 90 tablet, Rfl: 1    dupilumab 300 mg/2 mL pen injector, Inject 1 pen (300mg) subcutaneously every 2 weeks., Disp: 4 mL, Rfl: 11    losartan (COZAAR) 50 mg tablet, Take 1 tablet (50 mg total) by mouth daily, Disp: 90 tablet, Rfl: 3    spironolactone (ALDACTONE) 50 mg tablet, TAKE 1 TABLET BY MOUTH DAILY, Disp: 90 tablet, Rfl: 3    ferrous sulfate 325 mg (65 mg iron) tablet, Take 1 tablet (325 mg total) by mouth daily, Disp: , Rfl:     cholecalciferol, vitamin D3, 125 mcg (5,000 unit) tablet, Take 1 tablet (5,000 Units total) by mouth  "daily, Disp: , Rfl:     albuterol HFA (PROVENTIL HFA;VENTOLIN HFA) 90 mcg/actuation inhaler, Inhale 2 puffs every 2 (two) hours as needed for wheezing (every 2-3 hours as needed for cough/wheezing), Disp: 1 Inhaler, Rfl: 1    celecoxib (CeleBREX) 200 mg capsule, Take 1 capsule (200 mg total) by mouth 2 (two) times a day (Patient not taking: Reported on 1/10/2024), Disp: 180 capsule, Rfl: 3     Allergies:  The patient is allergic to adhesive tape-silicones.    Social History:  Social History     Tobacco Use    Smoking status: Never    Smokeless tobacco: Never   Vaping Use    Vaping Use: Never used   Substance Use Topics    Alcohol use: Yes     Alcohol/week: 0.0 standard drinks of alcohol     Comment: occ    Drug use: No       OB/GYN History: 1 SAB, 1 C/S, no h/o abnormal pap smears    Physical Exam:  Vitals:    01/10/24 1400   BP: 143/90   Temp: 37.2 °C (99 °F)   TempSrc: Temporal   Pulse: 94   SpO2: 94%   Height: 1.702 m (5' 7\")   Weight: 83.9 kg (185 lb)   PainSc: 0-No pain   Patient Position: Sitting     Weights (Current Encounter Only) (last 180 days)       Date/Time Weight    01/10/24 1400 83.9 kg (185 lb)          ECOG Performance Status: 0  Physical Exam  Vitals reviewed.   Constitutional:       Appearance: Normal appearance.   Pulmonary:      Effort: Pulmonary effort is normal.   Abdominal:      Comments: Soft nontender nondistended with no palpable masses or hernias.   Genitourinary:     Comments: No palpable masses on exam.  Neurological:      General: No focal deficit present.      Mental Status: She is alert and oriented to person, place, and time.   Psychiatric:         Mood and Affect: Mood normal.         Behavior: Behavior normal.         Thought Content: Thought content normal.         Judgment: Judgment normal.         Labs:  Lab Results   Component Value Date    WBC 7.4 12/27/2022    NEUTROABS 3.50 12/27/2022    HGB 15.2 12/27/2022     12/27/2022    K 4.7 12/27/2022    CREATININE 0.77 " 12/27/2022    BILITOT 0.38 12/27/2022    ALKPHOS 106 (H) 12/27/2022    AST 16 12/27/2022    ALT 15 12/27/2022    CALCIUM 9.5 12/27/2022     Lab Results   Component Value Date     32.6 11/11/2021     Imaging Reports:  11/29/2021 Path Report:  A. Fallopian tube, left, salpingectomy -      - Benign fallopian tube, negative for tumor implants or malignancy.     B. 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      - Benign fallopian tube, negative for tumor implants or malignancy.      - AJCC 8th edition tumor stage: pT1a.      - Additional pathologic findings:      - Cervix with no significant pathologic alteration.      - Attenuated, inactive appearing endometrium with benign endometrial   polyps (x2, 0.7 to 1.5 cm).      - Adenomyosis.     C. Omentum, biopsy -      - Benign fibroadipose tissue, negative for tumor implants or   malignancy.     US Transvaginal 1/10/24:  IMPRESSION:     Hysterectomy     Right oophorectomy     15 mm benign left ovarian cyst    Assessment:  Tamiko Tiwari is a 48 y.o. 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     Plan:  1.  History of seromucinous borderline tumor:  -No evidence of recurrent disease in her remaining ovary.  She remains without any menopausal symptoms at this time and appears to still be ovulated on the ovary  -Would recommend annual follow-up with a repeat ultrasound next year.  If the cyst formed on the ovary I have a low threshold to take her back to the operating room to remove this ovary.  Additionally when she actually completes menopause we could consider removal of the ovary at that time.  -She is aware that I will no longer be in practice but we will arrange appropriate follow-up for her in 1 year.    All  questions answered to patient's apparent satisfaction.    On this date of service 15 minutes of total time was spent on this encounter.      This note was generated using voice recognition software. Inadvertent word errors may have occurred, which were not recognized during proofreading process.     April Cao MD  [phone]

## 2024-01-11 DIAGNOSIS — D39.10 BORDERLINE EPITHELIAL NEOPLASM OF OVARY: Primary | ICD-10-CM

## 2024-01-24 NOTE — PATIENT INSTRUCTIONS
"Cosmetic Procedure Estimated Prices (prices are subject to change)    Cost of removal of skin tags (up to 15): $271   Each additional 10 is $53      Avoid sunlight between the hours of 10 am and 2 pm, wear a broad spectrum, water resistant sunscreen with SPF of 30-50 year round. Reapply sunscreen every 2 hours and after exercising or swimming. Wearing a 6\" broad brimmed hat, a lip sunblock of SPF of 30-50, and sun protective clothing is also suggested year round.   Recommended sunscreens include Neutrogena Ultra Sheer Dry Touch SPF 50 or higher, Neutrogena Sheer Zinc Dry Touch SPF 50, and Vanicream Sport.  ---------------------------------------------------------------------  Monthly self-examination of your skin is important. Should any lesions, including moles, change in size, shape, color, itch, bleed or burn, contact our office for sooner evaluation.  ----------------------------------------------------------------------  **We are always looking for opportunities to improve your care and patient experience. You will be receiving a survey about your care via text or e-mail. If you come across questions in the survey that are not applicable to your visit, please leave these questions blank. Your satisfaction is important to us so please be honest. Your identity is kept confidential. Thank you!**   "

## 2024-01-24 NOTE — PROGRESS NOTES
Subjective   New Dermatology Skin examination  Tamiko Tiwari is a 48 y.o. female who presents for a lesion of concern . Lesions of concern include: possible skin tags on the neck that get caught on necklaces. Present for 20 years. Spot on the right forearm that has been present for 5 years.       Patient does endorse a history of tanning bed use.  Patient skin history: Negative for personal history of non melanoma skin cancer, Negative for actinic keratosis, Negative personal history of melanoma,  Negative family history of melanoma.          Review of Systems  Review of Systems   Constitutional:  Negative for fatigue and fever.   Skin:  Negative for rash.   Allergic/Immunologic: Negative for environmental allergies and immunocompromised state.   Hematological:  Does not bruise/bleed easily.   All other systems reviewed and are negative.        Past Medical History:   Diagnosis Date    Allergic Pollen -     B12 deficiency 2022    Complex ovarian cyst     Depression     Endocrine disorder     Hypertension     Hypothyroid     Iron deficiency 2020    Psychiatric illness     depression    Respiratory disease     Severe persistent asthma with acute exacerbation 2020    Eosinophilic asthma; [place]          Past Surgical History:   Procedure Laterality Date     SECTION      CHOLECYSTECTOMY N/A 10/26/2022    Procedure: LAPAROSCOPIC CHOLECYSTECTOMY;  Surgeon: Joseph Altamirano MD;  Location: [facility] OR;  Service: General;  Laterality: N/A;    LAPAROSCOPIC HYSTERECTOMY N/A 2021    Procedure: LAPAROSCOPIC HYSTERECTOMY, right salpingo-oophorectomy, left salpingectomy, cystoscopy, omental biopsy.;  Surgeon: April Cao MD;  Location: [facility] OR;  Service: Gynecology;  Laterality: N/A;    NASAL TURBINATE REDUCTION Bilateral 2021    Procedure: BILATERAL INFERIOR TURBINATE REDUCTION;  Surgeon: Ally Carmen MD;  Location: [facility];  Service: ENT;  Laterality: Bilateral;     SINUS SURGERY Bilateral 11/17/2021    Procedure: FUNCTIONAL ENDOSCOPIC SINUS SURGERY WITH COMPUTER NAVIGATION - BILATERAL MAXILLAY and  ANTERIOR/POSTERIOR ETHMOID and SPHENOID  AND FRONTAL and BILATERAL INFERIOR TURBINATE REDUCTION (2 HR);  Surgeon: Ally Carmen MD;  Location: [facility] SURGICAL SERVICES;  Service: ENT;  Laterality: Bilateral;    SINUS SURGERY  11/17/2021         family history includes Alcohol abuse in her maternal grandfather and paternal grandfather; Asthma in her son; Cancer in her mother's sister; Eczema in her son; Heart failure in her maternal grandmother; Hypertension in her brother and mother; Multiple sclerosis in her father's brother; Other in her mother's brother and sister; Pacemaker/AICD in her paternal grandmother; Prostate cancer in her father; Stroke in her mother's brother; Tachycardia in her sister.      Medications  Current Outpatient Medications on File Prior to Visit   Medication Sig Dispense Refill    spironolactone (ALDACTONE) 50 mg tablet TAKE 1 TABLET BY MOUTH DAILY 90 tablet 0    levothyroxine (SYNTHROID, LEVOTHROID) 150 mcg tablet Take 1 tablet (150 mcg total) by mouth daily 90 tablet 1    sertraline (ZOLOFT) 100 mg tablet TAKE 1 TABLET BY MOUTH EVERY DAY 90 tablet 1    dupilumab 300 mg/2 mL pen injector Inject 1 pen (300mg) subcutaneously every 2 weeks. 4 mL 11    losartan (COZAAR) 50 mg tablet Take 1 tablet (50 mg total) by mouth daily 90 tablet 3    celecoxib (CeleBREX) 200 mg capsule Take 1 capsule (200 mg total) by mouth 2 (two) times a day (Patient not taking: Reported on 1/10/2024) 180 capsule 3    ferrous sulfate 325 mg (65 mg iron) tablet Take 1 tablet (325 mg total) by mouth daily      cholecalciferol, vitamin D3, 125 mcg (5,000 unit) tablet Take 1 tablet (5,000 Units total) by mouth daily      albuterol HFA (PROVENTIL HFA;VENTOLIN HFA) 90 mcg/actuation inhaler Inhale 2 puffs every 2 (two) hours as needed for wheezing (every 2-3 hours as needed for cough/wheezing) 1  Inhaler 1     No current facility-administered medications on file prior to visit.       Allergies  Adhesive tape-silicones      Physical Examination    Vital Signs /84   Pulse 70   LMP 10/23/2021   SpO2 97%       Physical Exam Findings:   Constitutional: General Appearance: healthy-appearing, well-nourished, and well-developed. Level of Distress: NAD. Ambulation: ambulating normally.  Psychiatric: Insight: good judgement. Mental Status: normal mood and affect and active and alert. Orientation: to time, place, and person. Memory: recent memory normal and remote memory normal.  Head: normocephalic and atraumatic.  Eyes: Lids and Conjunctivae: no discharge or pallor and non-injected. Lens: clear. Sclerae: non-icteric.  Neurologic: Gait and Station: normal gait and station. Cranial Nerves: grossly intact. Coordination and Cerebellum: no tremor.      A skin examination was performed of the following sites:    Trunk (including back, chest, abdomen): skin colored pedunculated papules along neckline   Arms/Hands: lesion question is right radial forearm is a 1 cm waxy stuck on patch consistent with a Seborrheic keratosis        Lesion to follow:   Left plantar foot 1.1 x 0.5 cm medium brown kite shaped papule with even globular pigment             Tamiko was seen today for skin tags.    Diagnoses and all orders for this visit:    Skin tag  discussed benign nature of skin tags. Discussed that in office removal is a cosmetic procedure and patient would be responsible for the full cost of treatment. Patient affirms understanding of cost responsibility. Discussed risks including pain, bleeding or recurrence. Patient would like to pursue cosmetic removal of skin tag in the office today. Please see patients cosmetic appointment for details.        Seborrheic keratosis  - Reassured patient that lesion is benign and no treatment is necessary unless bothersome.     Benign nevi  - Reassurance given for benign appearing nevi  today. Patient is to watch for any changes that would suggest atypia such as itching, bleeding, changing irregular shape, increased diameter or irregularity, and multiple colors in moles and contact our office if any such changes occur.     I emphasized daily sunscreen use with an SPF of 30-50, broad spectrum and water resistant.  I directed reapplication every two hours.  I recommended wide brimmed hats.  Finally, we discussed danger signs of changing skin lesions including sores not healing and moles changing in size, shape or color.  Should any of these lesions occur before next appointment, I instructed patent to call sooner for evaluation.        Return in about 4 months (around 6/16/2024) for Skin Check.

## 2024-01-29 DIAGNOSIS — I10 ESSENTIAL HYPERTENSION: ICD-10-CM

## 2024-01-29 NOTE — TELEPHONE ENCOUNTER
No care due was identified.  [facility] Embedded Care Due Messages. Reference number: [id number]. 1/29/2024 9:29:52 AM MST

## 2024-01-29 NOTE — TELEPHONE ENCOUNTER
Medication refill request:  Medication(s):  aldactone not filled due to Orders due: Review care due message/pended orders  Lab (s) are due and Patient does not have a future appointment scheduled within 90 days

## 2024-01-30 RX ORDER — SPIRONOLACTONE 50 MG/1
TABLET, FILM COATED ORAL
Qty: 90 TABLET | Refills: 0 | Status: SHIPPED | OUTPATIENT
Start: 2024-01-30 | End: 2024-03-25 | Stop reason: SDUPTHER

## 2024-02-16 ENCOUNTER — OFFICE VISIT (OUTPATIENT)
Dept: DERMATOLOGY | Facility: CLINIC | Age: 49
End: 2024-02-16
Payer: COMMERCIAL

## 2024-02-16 ENCOUNTER — PROCEDURE VISIT (OUTPATIENT)
Dept: DERMATOLOGY | Facility: CLINIC | Age: 49
End: 2024-02-16

## 2024-02-16 VITALS — HEART RATE: 70 BPM | OXYGEN SATURATION: 97 % | SYSTOLIC BLOOD PRESSURE: 126 MMHG | DIASTOLIC BLOOD PRESSURE: 84 MMHG

## 2024-02-16 DIAGNOSIS — L91.8 SKIN TAG: ICD-10-CM

## 2024-02-16 DIAGNOSIS — D22.9 BENIGN NEVUS: ICD-10-CM

## 2024-02-16 DIAGNOSIS — L91.8 SKIN TAG: Primary | ICD-10-CM

## 2024-02-16 DIAGNOSIS — L82.1 SEBORRHEIC KERATOSIS: ICD-10-CM

## 2024-02-16 PROCEDURE — 11200 RMVL SKIN TAGS UP TO&INC 15: CPT | Performed by: STUDENT IN AN ORGANIZED HEALTH CARE EDUCATION/TRAINING PROGRAM

## 2024-02-16 PROCEDURE — 99203 OFFICE O/P NEW LOW 30 MIN: CPT | Mod: 25 | Performed by: STUDENT IN AN ORGANIZED HEALTH CARE EDUCATION/TRAINING PROGRAM

## 2024-02-16 ASSESSMENT — ENCOUNTER SYMPTOMS
FATIGUE: 0
FEVER: 0
BRUISES/BLEEDS EASILY: 0
BRUISES/BLEEDS EASILY: 0
FATIGUE: 0
FEVER: 0

## 2024-02-16 NOTE — PROGRESS NOTES
THIS IS COSMETIC. DO NOT BILL PATIENT OR SUBMIT INSURANCE. WILL BE PAID IN FULL AT TIME OF SERVICE.    Return Dermatology Skin examination  Tamiko Tiwari is a 48 y.o. female presents for cosmetic procedure to remove skin tags from the neck via snip removal with sterile surgical scissors.      Review of Systems   Constitutional:  Negative for fatigue and fever.   Skin:  Negative for rash.   Allergic/Immunologic: Negative for environmental allergies and immunocompromised state.   Hematological:  Does not bruise/bleed easily.   All other systems reviewed and are negative.        Past Medical History:   Diagnosis Date    Allergic Pollen - 2019    B12 deficiency 12/27/2022    Complex ovarian cyst     Depression     Endocrine disorder     Hypertension     Hypothyroid     Iron deficiency 9/25/2020    Psychiatric illness     depression    Respiratory disease     Severe persistent asthma with acute exacerbation 08/07/2020    Eosinophilic asthma; [place]       Current Outpatient Medications on File Prior to Visit   Medication Sig Dispense Refill    spironolactone (ALDACTONE) 50 mg tablet TAKE 1 TABLET BY MOUTH DAILY 90 tablet 0    levothyroxine (SYNTHROID, LEVOTHROID) 150 mcg tablet Take 1 tablet (150 mcg total) by mouth daily 90 tablet 1    sertraline (ZOLOFT) 100 mg tablet TAKE 1 TABLET BY MOUTH EVERY DAY 90 tablet 1    dupilumab 300 mg/2 mL pen injector Inject 1 pen (300mg) subcutaneously every 2 weeks. 4 mL 11    losartan (COZAAR) 50 mg tablet Take 1 tablet (50 mg total) by mouth daily 90 tablet 3    celecoxib (CeleBREX) 200 mg capsule Take 1 capsule (200 mg total) by mouth 2 (two) times a day (Patient not taking: Reported on 1/10/2024) 180 capsule 3    ferrous sulfate 325 mg (65 mg iron) tablet Take 1 tablet (325 mg total) by mouth daily      cholecalciferol, vitamin D3, 125 mcg (5,000 unit) tablet Take 1 tablet (5,000 Units total) by mouth daily      albuterol HFA (PROVENTIL HFA;VENTOLIN HFA) 90 mcg/actuation inhaler  Inhale 2 puffs every 2 (two) hours as needed for wheezing (every 2-3 hours as needed for cough/wheezing) 1 Inhaler 1     No current facility-administered medications on file prior to visit.       Allergies  Adhesive tape-silicones    The following have been reviewed and updated as appropriate in this visit          Physical Examination    Vital Signs  vitals were not taken for this visit.         Physical Exam Findings: skin colored pedunculated papules along neckline        Procedure Note:  Skin Tag Removal    Date/Time: 2/16/2024 4:31 PM    Performed by: Sri Gomes MD      Consent  Verbal consent was obtained from the patient. Written consent was obtained from the patient. Risks, benefits, and alternatives were discussed. Consent given by patient. The patient states understanding of the procedure being performed. Patient ID confirmed verbally with the patient. Possibility of no insurance coverage for procedure was discussed.     Wound 1 Procedure Details    Head/neck location:  Number of head/neck removed: 14  Head/neck location: 7 on the right neck and 7 on the left neck.    Procedure Details   The areas were prepped with alcohol. Local anesthesia was not used. Skin tag(s) were removed with scissors & forceps. 14 total skin tags were removed. Lesions removed at the base at level of the dermis.  Hemostasis was achieved with pressure.     Post-Procedure  Patient tolerated the procedure well with no complications. Standard dressing was applied. Patient was given wound care instructions.     Procedure Comments  THIS IS COSMETIC. DO NOT BILL PATIENT OR SUBMIT INSURANCE. WILL BE PAID IN FULL AT TIME OF SERVICE.

## 2024-02-16 NOTE — PROGRESS NOTES
THIS IS COSMETIC. DO NOT BILL PATIENT OR SUBMIT INSURANCE. WILL BE PAID IN FULL AT TIME OF SERVICE.    Return Dermatology Skin examination  Tamiko Tiwari is a 48 y.o. female presents for cosmetic procedure to remove *** from the *** via {cosmetic destruction methods:01631}.      Review of Systems      Past Medical History:   Diagnosis Date    Allergic Pollen - 2019    B12 deficiency 12/27/2022    Complex ovarian cyst     Depression     Endocrine disorder     Hypertension     Hypothyroid     Iron deficiency 9/25/2020    Psychiatric illness     depression    Respiratory disease     Severe persistent asthma with acute exacerbation 08/07/2020    Eosinophilic asthma; Gobles       Current Outpatient Medications on File Prior to Visit   Medication Sig Dispense Refill    spironolactone (ALDACTONE) 50 mg tablet TAKE 1 TABLET BY MOUTH DAILY 90 tablet 0    levothyroxine (SYNTHROID, LEVOTHROID) 150 mcg tablet Take 1 tablet (150 mcg total) by mouth daily 90 tablet 1    sertraline (ZOLOFT) 100 mg tablet TAKE 1 TABLET BY MOUTH EVERY DAY 90 tablet 1    dupilumab 300 mg/2 mL pen injector Inject 1 pen (300mg) subcutaneously every 2 weeks. 4 mL 11    losartan (COZAAR) 50 mg tablet Take 1 tablet (50 mg total) by mouth daily 90 tablet 3    celecoxib (CeleBREX) 200 mg capsule Take 1 capsule (200 mg total) by mouth 2 (two) times a day (Patient not taking: Reported on 1/10/2024) 180 capsule 3    ferrous sulfate 325 mg (65 mg iron) tablet Take 1 tablet (325 mg total) by mouth daily      cholecalciferol, vitamin D3, 125 mcg (5,000 unit) tablet Take 1 tablet (5,000 Units total) by mouth daily      albuterol HFA (PROVENTIL HFA;VENTOLIN HFA) 90 mcg/actuation inhaler Inhale 2 puffs every 2 (two) hours as needed for wheezing (every 2-3 hours as needed for cough/wheezing) 1 Inhaler 1     No current facility-administered medications on file prior to visit.       Allergies  Adhesive tape-silicones    The following have been reviewed and updated as  appropriate in this visit          Physical Examination    Vital Signs  vitals were not taken for this visit.         Physical Exam Findings: ***          Procedure Note:  ***

## 2024-03-01 ENCOUNTER — TELEPHONE (OUTPATIENT)
Dept: DERMATOLOGY | Facility: CLINIC | Age: 49
End: 2024-03-01
Payer: COMMERCIAL

## 2024-03-01 NOTE — TELEPHONE ENCOUNTER
LVTCHIGINIO. Pt is scheduled for 6/7 at 3pm, Dr. Gomes will be out of clinic that afternoon. Needs rescheduled

## 2024-03-11 ENCOUNTER — TELEPHONE (OUTPATIENT)
Dept: FAMILY MEDICINE | Facility: CLINIC | Age: 49
End: 2024-03-11

## 2024-03-11 DIAGNOSIS — R92.8 ABNORMAL MAMMOGRAM OF BOTH BREASTS: Primary | ICD-10-CM

## 2024-03-11 NOTE — TELEPHONE ENCOUNTER
Caller would like to discuss (a) return call Writer has advised caller of a callback from within 24 hours.    Patient: Tamiko Tiwari    Caller Name (Last and first, relation/role): self    Name of Facility: [facility]    Callback Number: [phone]    Best Availability: any    Fax Number: [facility]    Additional Info: Mammo results need next steps     Did you confirm the message with the caller: Yes    Is it okay that the nurse communicates your response through MyChart? No

## 2024-03-19 ENCOUNTER — OFFICE VISIT NO LOS (OUTPATIENT)
Dept: AUDIOLOGY | Facility: CLINIC | Age: 49
End: 2024-03-19
Payer: COMMERCIAL

## 2024-03-19 DIAGNOSIS — Z46.1 HEARING AID FITTING OR ADJUSTMENT: Primary | ICD-10-CM

## 2024-03-19 PROCEDURE — V5014 HEARING AID REPAIR/MODIFYING: HCPCS | Mod: NC | Performed by: AUDIOLOGIST

## 2024-03-19 NOTE — PROGRESS NOTES
Hearing Aid Adjustment    Tamiko Tiwari was seen on 3/19/2024 for a hearing aid adjustment at [facility].      Subjective   Patient reports that she is having increased difficulty hearing.      Objective / Assessment   Cleaned hearing aids.    - placed in ReDux machine (<0.5 uL removed) Left was weaker than right, but did improve with placement in ReDux machine.     Discussed sending in Cshell molds as they can get weak overtime and it has been about one year since she purchased them.     Also discussed increasing volume, however volume is not necessarily always a fix for clarity issues.     Plan  Ms. Tiwari should return to the clinic or call if she would like the cshell molds replaced to see if it helps improve clarity. Should also return in August for an annual hearing evaluation to monitor hearing sensitivity and word recognition.           No Charge today.

## 2024-03-25 ENCOUNTER — LAB (OUTPATIENT)
Dept: LAB | Facility: CLINIC | Age: 49
End: 2024-03-25
Payer: COMMERCIAL

## 2024-03-25 ENCOUNTER — OFFICE VISIT (OUTPATIENT)
Dept: FAMILY MEDICINE | Facility: CLINIC | Age: 49
End: 2024-03-25
Payer: COMMERCIAL

## 2024-03-25 VITALS
DIASTOLIC BLOOD PRESSURE: 82 MMHG | OXYGEN SATURATION: 95 % | TEMPERATURE: 97.3 F | WEIGHT: 178.5 LBS | HEART RATE: 84 BPM | SYSTOLIC BLOOD PRESSURE: 130 MMHG | BODY MASS INDEX: 28.02 KG/M2 | HEIGHT: 67 IN

## 2024-03-25 DIAGNOSIS — Z00.00 WELLNESS EXAMINATION: Primary | ICD-10-CM

## 2024-03-25 DIAGNOSIS — E03.9 ACQUIRED HYPOTHYROIDISM: Chronic | ICD-10-CM

## 2024-03-25 DIAGNOSIS — I10 ESSENTIAL HYPERTENSION: ICD-10-CM

## 2024-03-25 DIAGNOSIS — G47.00 INSOMNIA, UNSPECIFIED TYPE: ICD-10-CM

## 2024-03-25 LAB
ANION GAP SERPL CALC-SCNC: 8 MMOL/L (ref 3–11)
BUN SERPL-MCNC: 21 MG/DL (ref 7–25)
CALCIUM SERPL-MCNC: 9.5 MG/DL (ref 8.6–10.3)
CHLORIDE SERPL-SCNC: 102 MMOL/L (ref 98–107)
CHOLEST SERPL-MCNC: 227 MG/DL (ref 0–199)
CO2 SERPL-SCNC: 29 MMOL/L (ref 21–32)
CREAT SERPL-MCNC: 0.73 MG/DL (ref 0.6–1.1)
EGFRCR SERPLBLD CKD-EPI 2021: 100 ML/MIN/1.73M*2
FASTING STATUS PATIENT QL REPORTED: YES
GLUCOSE SERPL-MCNC: 86 MG/DL (ref 70–105)
HDLC SERPL-MCNC: 64 MG/DL
LDLC SERPL CALC-MCNC: 138 MG/DL (ref 20–99)
POTASSIUM SERPL-SCNC: 4.5 MMOL/L (ref 3.5–5.1)
SODIUM SERPL-SCNC: 139 MMOL/L (ref 135–145)
TRIGL SERPL-MCNC: 127 MG/DL
TSH SERPL DL<=0.05 MIU/L-ACNC: 2.26 UIU/ML (ref 0.34–4.82)

## 2024-03-25 PROCEDURE — 80061 LIPID PANEL: CPT | Performed by: FAMILY MEDICINE

## 2024-03-25 PROCEDURE — 99396 PREV VISIT EST AGE 40-64: CPT | Performed by: FAMILY MEDICINE

## 2024-03-25 PROCEDURE — 80048 BASIC METABOLIC PNL TOTAL CA: CPT | Performed by: FAMILY MEDICINE

## 2024-03-25 PROCEDURE — 84443 ASSAY THYROID STIM HORMONE: CPT | Performed by: FAMILY MEDICINE

## 2024-03-25 PROCEDURE — 36415 COLL VENOUS BLD VENIPUNCTURE: CPT | Performed by: FAMILY MEDICINE

## 2024-03-25 RX ORDER — LOSARTAN POTASSIUM 50 MG/1
50 TABLET ORAL DAILY
Qty: 90 TABLET | Refills: 2 | Status: SHIPPED | OUTPATIENT
Start: 2024-03-25

## 2024-03-25 RX ORDER — SERTRALINE HYDROCHLORIDE 100 MG/1
100 TABLET, FILM COATED ORAL DAILY
Qty: 90 TABLET | Refills: 3 | Status: SHIPPED | OUTPATIENT
Start: 2024-03-25

## 2024-03-25 RX ORDER — SPIRONOLACTONE 50 MG/1
50 TABLET, FILM COATED ORAL DAILY
Qty: 90 TABLET | Refills: 3 | Status: SHIPPED | OUTPATIENT
Start: 2024-03-25

## 2024-03-25 ASSESSMENT — PAIN SCALES - GENERAL: PAINLEVEL: 0-NO PAIN

## 2024-03-25 NOTE — PROGRESS NOTES
SUBJECTIVE:    Chief Complaint   Patient presents with    Adult Well Visit         Tamiko Tiwari is a 49 y.o. female presenting for an annual exam. She has no acute concerns today. She does still report pain to the last rib on the right side. Pain has been ongoing for 4 years but is improving. Workup with CT scan has been negative for abnormality.     Patient Active Problem List   Diagnosis    Acquired hypothyroidism    Essential hypertension    Vitamin D deficiency    Sensorineural hearing loss (SNHL) of both ears    Severe persistent asthma with acute exacerbation    Menometrorrhagia    Iron deficiency    Chronic pansinusitis    Hypertrophy of nasal turbinates    Borderline epithelial neoplasm of ovary    B12 deficiency       Outpatient Medications Prior to Visit   Medication Sig Dispense Refill    levothyroxine (SYNTHROID, LEVOTHROID) 150 mcg tablet Take 1 tablet (150 mcg total) by mouth daily 90 tablet 1    dupilumab 300 mg/2 mL pen injector Inject 1 pen (300mg) subcutaneously every 2 weeks. 4 mL 11    cholecalciferol, vitamin D3, 125 mcg (5,000 unit) tablet Take 1 tablet (5,000 Units total) by mouth daily      albuterol HFA (PROVENTIL HFA;VENTOLIN HFA) 90 mcg/actuation inhaler Inhale 2 puffs every 2 (two) hours as needed for wheezing (every 2-3 hours as needed for cough/wheezing) 1 Inhaler 1    spironolactone (ALDACTONE) 50 mg tablet TAKE 1 TABLET BY MOUTH DAILY 90 tablet 0    sertraline (ZOLOFT) 100 mg tablet TAKE 1 TABLET BY MOUTH EVERY DAY 90 tablet 1    losartan (COZAAR) 50 mg tablet Take 1 tablet (50 mg total) by mouth daily 90 tablet 3    ferrous sulfate 325 mg (65 mg iron) tablet Take 1 tablet (325 mg total) by mouth daily      celecoxib (CeleBREX) 200 mg capsule Take 1 capsule (200 mg total) by mouth 2 (two) times a day (Patient not taking: Reported on 1/10/2024) 180 capsule 3     No facility-administered medications prior to visit.       Family Status   Relation Name Status    Mother Izabela Alive,  age 78y    Father Rudy Alive, age 79y    Sister Macy Alive, age 56y    Sister Lucia Alive, age 52y    Brother Vinnie Alive, age 54y    Maternal GM Lissy  at age 60s    Maternal GF Henrik  at age 80s    Paternal ARLETH Thomas Alive, age 99y    Paternal GF Rudy  at age 90s    Son  Alive    Mat Aunt Pat (Not Specified)    Mat Uncle  (Not Specified)    Mat Uncle Shaggy (Not Specified)    Pat Uncle  (Not Specified)    Neg Hx  (Not Specified)       Family History   Problem Relation Age of Onset    Hypertension Mother     Prostate cancer Father     Other Sister         Hysterectomy due to benign tumors     Tachycardia Sister     Hypertension Brother     Heart failure Maternal Grandmother     Alcohol abuse Maternal Grandfather     Pacemaker/AICD Paternal Grandmother     Alcohol abuse Paternal Grandfather     Asthma Son     Eczema Son     Cancer Mother's Sister         unsure of what type    Other Mother's Brother     Stroke Mother's Brother     Multiple sclerosis Father's Brother     Ovarian cancer Neg Hx     Pancreatic cancer Neg Hx     Melanoma Neg Hx     Breast cancer Neg Hx     Uterine cancer Neg Hx          The patient's past medical history, medications, allergies, family history and social history were reviewed in her electronic medical record at today's visit.      REVIEW OF SYSTEMS:  Constitutional: Weight down 9 pounds from last year; intentionally.   HEENT:  No change in vision. Hearing is worsening- does wear hearing airs.   Pulmonary: No dyspnea on exertion, no wheezing, no shortness of breath  Cardiovascular: No exercise intolerance, no chest pain, no diaphoresis, no edema  Gastrointestinal: No abdominal pain, no change in bowel habits, no significant GERD  :  The patient denies frequent UTI, frequency or incomplete voiding.  No dyspareunia. Reports nocturia X1. Menses:  absent; had a hysterectomy.   Skin/Integumentary: No abnormal skin lesions noted.  Neurologic: No chronic  "headaches.  Psychiatric: no anxiety. Has depression- well controlled on current therapy.   Musculoskeletal:  no joint pain or swelling.       OBJECTIVE:   The patient appears well, in NAD. Mood and affect appropriate.  /82   Pulse 84   Temp 36.3 °C (97.3 °F) (Temporal)   Ht 1.702 m (5' 7\")   Wt 81 kg (178 lb 8 oz)   LMP 10/23/2021   SpO2 95%   BMI 27.96 kg/m²    HEENT: Eyes without gross abnormality; Ears normal; TM's are clear. Throat and pharynx normal. Teeth in good repair.  Neck supple. No adenopathy or thyromegaly.  Lungs: Clear, good air entry, no wheezes, rhonchi or rales.   Heart: S1 and S2 normal, no murmurs, regular rate and rhythm. Carotids are without bruits bilaterally.  Breasts:  Symmetrical, without mass,nipple discharge or axillary adenopathy.  Abdomen: Soft without tenderness, guarding, mass or hepatosplenomegaly. Point tenderness over bottom right rib.    Extremities: No edema, no varicosities. Distal pulses intact.  Skin: I note only benign skin findings. No unusual rashes or suspicious skin lesions noted. Nails appear normal.      Labs:    Pending     ASSESSMENT:    Diagnosis Plan   1. Wellness examination  Lipid panel Blood, Venous    Basic metabolic panel Blood, Venous    Basic metabolic panel Blood, Venous    Lipid panel Blood, Venous      2. Acquired hypothyroidism  Thyroid Stimulating Hormone, Ultrasensitive Blood, Venous    Thyroid Stimulating Hormone, Ultrasensitive Blood, Venous      3. Essential hypertension  losartan (COZAAR) 50 mg tablet    spironolactone (ALDACTONE) 50 mg tablet      4. Insomnia, unspecified type  sertraline (ZOLOFT) 100 mg tablet           PLAN:   The patient was provided written information regarding healthcare maintenance in today's AVS.  This includes diet, exercise, vaccinations and age appropriate cancer screenings.      Tamiko will check with insurance on coverage for Shingrix vaccination. We did also discuss speaking with specialty pharmacy on " timing of vaccination while on a biologic.     Return in about 1 year (around 3/25/2025) for 30 MIN- PHYSICAL, LAB appointment prior to next visit- orders in.    Zunilda Littlejohn DNP Student

## 2024-03-25 NOTE — PATIENT INSTRUCTIONS
Female Wellness    Adopting a healthy lifestyle and getting preventive care can go a long way to promote health and wellness. Talk with your health care provider about what schedule of regular examinations is right for you. This is a good chance for you to check in with your provider about disease prevention and staying healthy.    In between checkups, there are plenty of things you can do on your own. Experts have done a lot of research about which lifestyle changes and preventive measures are most likely to keep you healthy. Ask your health care provider for more information.    Weight and diet  Eat a healthy diet  Be sure to include plenty of vegetables, fruits, low-fat dairy products, and lean protein.  Do not eat a lot of foods high in solid fats, added sugars, or salt.  Get regular exercise. This is one of the most important things you can do for your health.  Most adults should exercise for at least 150 minutes each week. The exercise should increase your heart rate and make you sweat (moderate-intensity exercise).  Most adults should also do strengthening exercises at least twice a week. This is in addition to the moderate-intensity exercise.    Maintain a healthy weight  Body mass index (BMI) is a measurement that can be used to identify possible weight problems. It estimates body fat based on height and weight. Your health care provider can help determine your BMI and help you achieve or maintain a healthy weight.  For females 20 years of age and older:  A BMI below 18.5 is considered underweight.  A BMI of 18.5 to 24.9 is normal.  A BMI of 25 to 29.9 is considered overweight.  A BMI of 30 and above is considered obese.    Your BMI today was:  Body mass index is 27.96 kg/m².     Watch levels of cholesterol and blood lipids  You should start having your blood tested for lipids and cholesterol at 20 years of age, then have this test every 5 years.  You may need to have your cholesterol levels checked more  often if:  Your lipid or cholesterol levels are high.  You are older than 50 years of age.  You are at high risk for heart disease.    Cancer screening  Lung Cancer  Lung cancer screening is recommended for adults 55-80 years old who are at high risk for lung cancer because of a history of smoking.  A yearly low-dose CT scan of the lungs is recommended for people who:  Currently smoke.  Have quit within the past 15 years.  Have at least a 30-pack-year history of smoking. A pack year is smoking an average of one pack of cigarettes a day for 1 year.  Yearly screening should continue until it has been 15 years since you quit.  Yearly screening should stop if you develop a health problem that would prevent you from having lung cancer treatment.    Breast Cancer  Practice breast self-awareness. This means understanding how your breasts normally appear and feel.  It also means doing regular breast self-exams. Let your health care provider know about any changes, no matter how small.  If you are in your 20s or 30s, you should have a clinical breast exam (CBE) by a health care provider every 1-3 years as part of a regular health exam.  If you are 40 or older, have a CBE every year. Also consider having a breast X-ray (mammogram) every year.  If you have a family history of breast cancer, talk to your health care provider about genetic screening.  If you are at high risk for breast cancer, talk to your health care provider about having an MRI and a mammogram every year.  Breast cancer gene (BRCA) assessment is recommended for women who have family members with BRCA-related cancers. BRCA-related cancers include:  Breast.  Ovarian.  Tubal.  Peritoneal cancers.  Results of the assessment will determine the need for genetic counseling and BRCA1 and BRCA2 testing.    Cervical Cancer  Your health care provider may recommend that you be screened regularly for cancer of the pelvic organs (ovaries, uterus, and vagina). This screening  involves a pelvic examination, including checking for microscopic changes to the surface of your cervix (Pap test). You may be encouraged to have this screening done every 3 years, beginning at age 21.  For women ages 30-65, health care providers may recommend pelvic exams and Pap testing every 3 years, or they may recommend the Pap and pelvic exam, combined with testing for human papilloma virus (HPV), every 5 years. Some types of HPV increase your risk of cervical cancer. Testing for HPV may also be done on women of any age with unclear Pap test results.  Other health care providers may not recommend any screening for nonpregnant women who are considered low risk for pelvic cancer and who do not have symptoms. Ask your health care provider if a screening pelvic exam is right for you.    Colorectal Cancer  This type of cancer can be detected and often prevented.  Routine colorectal cancer screening usually begins at 45 years of age and continues through 75 years of age.  Your health care provider may recommend screening at an earlier age if you have risk factors for colon cancer.  Your health care provider may also recommend using home test kits to check for hidden blood in the stool if you are considered normal or low risk.  These tests are called FIT or Cologuard tests.  A small camera at the end of a tube can be used to examine your colon directly (colonoscopy). This is done to check for the earliest forms of colorectal cancer.  Routine screening usually begins at age 45.  Direct examination of the colon should be repeated every 5-10 years through 75 years of age. However, you may need to be screened more often if early forms of precancerous polyps or small growths are found.    Skin Cancer  Check your skin from head to toe regularly.  Tell your health care provider about any new moles or changes in moles, especially if there is a change in a mole's shape or color.  Also tell your health care provider if you  have a mole that is larger than the size of a pencil eraser.  Always use sunscreen. Apply sunscreen liberally and repeatedly throughout the day.  Protect yourself by wearing long sleeves, pants, a wide-brimmed hat, and sunglasses whenever you are outside.    Heart disease, diabetes, and high blood pressure  High blood pressure causes heart disease and increases the risk of stroke. High blood pressure is more likely to develop in:  People who have blood pressure in the high end of the normal range (130-139/85-89 mm Hg).  People who are overweight or obese.  People who are .  If you are 18-39 years of age, have your blood pressure checked every 3-5 years. If you are 40 years of age or older, have your blood pressure checked every year. You should have your blood pressure measured twice--once when you are at a hospital or clinic, and once when you are not at a hospital or clinic. Record the average of the two measurements. To check your blood pressure when you are not at a hospital or clinic, you can use:  An automated blood pressure machine at a pharmacy.  A home blood pressure monitor.  Have regular diabetes screenings. This involves taking a blood sample to check your fasting blood sugar level.  If you are at a normal weight and have a low risk for diabetes, have this test once every three years after 45 years of age.  If you are overweight and have a high risk for diabetes, consider being tested at a younger age or more often.    Hepatitis C  Blood testing is recommended for:  Everyone born from 1945 through 1965.  Anyone with known risk factors for hepatitis C.    Sexually transmitted infections (STIs)  You should be screened for sexually transmitted infections (STIs) including gonorrhea and chlamydia if:  You are sexually active and are younger than 24 years of age.  You are older than 24 years of age and your health care provider tells you that you are at risk for this type of infection.  Your  sexual activity has changed since you were last screened and you are at an increased risk for chlamydia or gonorrhea. Ask your health care provider if you are at risk.  Pregnancy  If you are premenopausal and you may become pregnant, ask your health care provider about preconception counseling.  If you may become pregnant, take 400 to 800 micrograms (mcg) of folic acid every day.  If you want to prevent pregnancy, talk to your health care provider about birth control (contraception).    Osteoporosis and menopause  Osteoporosis is a disease in which the bones lose minerals and strength with aging. This can result in serious bone fractures. Your risk for osteoporosis can be identified using a bone density scan.  If you are 65 years of age or older, or if you are at risk for osteoporosis and fractures, ask your health care provider if you should be screened.  Ask your health care provider whether you should take a calcium or vitamin D supplement to lower your risk for osteoporosis.      Follow these instructions at home:  Schedule regular health, dental, and eye exams.  Stay current with your immunizations.  Do not use any tobacco products including cigarettes, chewing tobacco, electronic cigarettes or drugs.  If you are pregnant, do not drink alcohol.  If you are breastfeeding, limit how much and how often you drink alcohol.  Limit alcohol intake to no more than 1 drink per day for nonpregnant women. One drink equals 12 ounces of beer, 5 ounces of wine, or 1½ ounces of hard liquor.  Tell your health care provider if you often feel depressed.  Tell your health care provider if you have ever been abused or do not feel safe at home.        [employer] Interactive Patient Education © 2018 [employer]

## 2024-05-17 NOTE — PROGRESS NOTES
Karey Hinson is here for IV hydration due to dehydration, weakness and nausea.    [facility] - Ambulatory/capable of self-care, unable to perform work activities.  Up & about more than 50% of the day.    Nursing Assessment:  A comprehensive nursing assessment was performed and the patient reports the following:    Nausea: YES, nausea started this weekend  Vomiting: NO  Fever: NO  Chills: NO  Other signs of infection: NO  Bleeding: NO  Mucositis: NO  Diarrhea: NO  Constipation: YES, unchanged  Anorexia: YES, no appetite, feeling awful all weekend  Dysuria: NO  Urinary Bleeding: NO  Cough: yes mild cough  Shortness of Breath: YES, with exertion  Fatigue/Weakness: YES, Very tired. Had a bad weekend, very tired and weak.  Numbness/Tingling: YES, mild  Other Neuropathies: NO  Edema: NO  Rash: NO  Hand/Foot Syndrome: NO  Pain: YES, right side pain, 3-4/10 her usual  Anxiety/Depression/Insomnia: YES, anxious      Vitals:  Weight:  Wt Readings from Last 1 Encounters:   10/02/17 60.3 kg     Labs:  Sodium (mmol/L)   Date Value   10/02/2017 PENDING     Potassium (mmol/L)   Date Value   10/02/2017 PENDING     Chloride (mmol/L)   Date Value   10/02/2017 PENDING     Glucose (mg/dL)   Date Value   10/02/2017 PENDING     CALCIUM (mg/dL)   Date Value   10/02/2017 PENDING     Carbon Dioxide (mmol/L)   Date Value   10/02/2017 PENDING     BUN (mg/dL)   Date Value   10/02/2017 PENDING     Creatinine (mg/dL)   Date Value   10/02/2017 PENDING     MAGNESIUM (mg/dL)   Date Value   2017 2.5 (H)       Treatment:  Refer to [facility] and MAR for line assessment and medication administration    Post Treatment: Treatment tolerated well; no adverse reaction    Patient Education: No new instructions needed    Patient Discharged: patient discharged to home per self, ambulatory, with family member    Next appointment scheduled: 10/6  Patient instructed to call the office with any questions or concerns.  Dr. Bright is supervising provider today.         Subjective   Return Dermatology Skin examination  Tamiko Tiwari is a 49 y.o. female with a history of  benign nevi  who presents for a lesion of concern . Lesions of concern include:   Left plantar foot 1.1 x 0.5 cm medium brown kite shaped papule with even globular pigment       Review of Systems   Constitutional:  Negative for fatigue and fever.   Skin:  Negative for rash.   Allergic/Immunologic: Negative for environmental allergies and immunocompromised state.   Hematological:  Does not bruise/bleed easily.   All other systems reviewed and are negative.        Past Medical History:   Diagnosis Date    Allergic Pollen - 2019    B12 deficiency 12/27/2022    Complex ovarian cyst     Depression     Endocrine disorder     Hypertension     Hypothyroid     Iron deficiency 9/25/2020    Psychiatric illness     depression    Respiratory disease     Severe persistent asthma with acute exacerbation 08/07/2020    Eosinophilic asthma; [place]       Current Outpatient Medications on File Prior to Visit   Medication Sig Dispense Refill    losartan (COZAAR) 50 mg tablet Take 1 tablet (50 mg total) by mouth daily 90 tablet 2    sertraline (ZOLOFT) 100 mg tablet Take 1 tablet (100 mg total) by mouth daily 90 tablet 3    spironolactone (ALDACTONE) 50 mg tablet Take 1 tablet (50 mg total) by mouth daily 90 tablet 3    levothyroxine (SYNTHROID, LEVOTHROID) 150 mcg tablet Take 1 tablet (150 mcg total) by mouth daily 90 tablet 1    dupilumab 300 mg/2 mL pen injector Inject 1 pen (300mg) subcutaneously every 2 weeks. 4 mL 11    ferrous sulfate 325 mg (65 mg iron) tablet Take 1 tablet (325 mg total) by mouth daily      cholecalciferol, vitamin D3, 125 mcg (5,000 unit) tablet Take 1 tablet (5,000 Units total) by mouth daily      albuterol HFA (PROVENTIL HFA;VENTOLIN HFA) 90 mcg/actuation inhaler Inhale 2 puffs every 2 (two) hours as needed for wheezing (every 2-3 hours as needed for cough/wheezing) 1 Inhaler 1     No current  facility-administered medications on file prior to visit.       Allergies  Adhesive tape-silicones    The following have been reviewed and updated as appropriate in this visit          Physical Examination    Vital Signs  blood pressure is 124/72 and her pulse is 79. Her respiration is 16 and oxygen saturation is 96%.         Physical Exam Findings:     Constitutional: General Appearance: healthy-appearing, well-nourished, and well-developed. Level of Distress: NAD.           A skin examination was performed of the following sites:  Left plantar foot 1.1 x 0.5 cm medium brown kite shaped papule with even globular pigment  - unchanged from last visit               Tamiko was seen today for follow-up.    Diagnoses and all orders for this visit:    Benign nevus of plantar aspect of foot    - Reassurance given for benign appearing nevi today. Patient is to watch for any changes that would suggest atypia such as itching, bleeding, changing irregular shape, increased diameter or irregularity, and multiple colors in moles and contact our office if any such changes occur.             I emphasized daily sunscreen use with an SPF of 30-50, broad spectrum and water resistant.  I directed reapplication every two hours.  I recommended wide brimmed hats.  Finally, we discussed danger signs of changing skin lesions including sores not healing and moles changing in size, shape or color.  Should any of these lesions occur before next appointment, I instructed patent to call sooner for evaluation.    Patient expresses understanding and agreement with the plan of care, and had no further questions at the end of the exam today.     No follow-ups on file.

## 2024-06-07 ENCOUNTER — OFFICE VISIT (OUTPATIENT)
Dept: DERMATOLOGY | Facility: CLINIC | Age: 49
End: 2024-06-07
Payer: COMMERCIAL

## 2024-06-07 VITALS
RESPIRATION RATE: 16 BRPM | SYSTOLIC BLOOD PRESSURE: 124 MMHG | HEART RATE: 79 BPM | DIASTOLIC BLOOD PRESSURE: 72 MMHG | OXYGEN SATURATION: 96 %

## 2024-06-07 DIAGNOSIS — D22.70: Primary | ICD-10-CM

## 2024-06-07 PROCEDURE — 99212 OFFICE O/P EST SF 10 MIN: CPT | Performed by: STUDENT IN AN ORGANIZED HEALTH CARE EDUCATION/TRAINING PROGRAM

## 2024-06-07 ASSESSMENT — PAIN SCALES - GENERAL: PAINLEVEL: 0-NO PAIN

## 2024-06-07 ASSESSMENT — ENCOUNTER SYMPTOMS
FEVER: 0
BRUISES/BLEEDS EASILY: 0
FATIGUE: 0

## 2024-06-20 DIAGNOSIS — E03.9 ACQUIRED HYPOTHYROIDISM: Chronic | ICD-10-CM

## 2024-06-20 NOTE — TELEPHONE ENCOUNTER
Unable to retrieve patient chart and identify care due.  [facility] Embedded Care Due Messages. Reference number: [id number]. 6/20/2024 8:40:44 AM MDT

## 2024-06-21 RX ORDER — LEVOTHYROXINE SODIUM 150 UG/1
150 TABLET ORAL DAILY
Qty: 90 TABLET | Refills: 2 | Status: SHIPPED | OUTPATIENT
Start: 2024-06-21

## 2024-06-23 ENCOUNTER — OFFICE VISIT (OUTPATIENT)
Dept: URGENT CARE | Facility: URGENT CARE | Age: 49
End: 2024-06-23
Payer: COMMERCIAL

## 2024-06-23 VITALS
WEIGHT: 189 LBS | HEART RATE: 91 BPM | SYSTOLIC BLOOD PRESSURE: 143 MMHG | OXYGEN SATURATION: 96 % | DIASTOLIC BLOOD PRESSURE: 81 MMHG | TEMPERATURE: 97.7 F | RESPIRATION RATE: 16 BRPM | BODY MASS INDEX: 29.6 KG/M2

## 2024-06-23 DIAGNOSIS — Z87.09 HISTORY OF ASTHMA: ICD-10-CM

## 2024-06-23 DIAGNOSIS — J40 BRONCHITIS: Primary | ICD-10-CM

## 2024-06-23 PROCEDURE — 99213 OFFICE O/P EST LOW 20 MIN: CPT | Performed by: NURSE PRACTITIONER

## 2024-06-23 RX ORDER — FLUTICASONE PROPIONATE AND SALMETEROL 250; 50 UG/1; UG/1
1 POWDER RESPIRATORY (INHALATION) 2 TIMES DAILY
Qty: 60 EACH | Refills: 0 | Status: SHIPPED | OUTPATIENT
Start: 2024-06-23

## 2024-06-23 RX ORDER — BENZONATATE 200 MG/1
200 CAPSULE ORAL 3 TIMES DAILY PRN
Qty: 20 CAPSULE | Refills: 0 | Status: SHIPPED | OUTPATIENT
Start: 2024-06-23 | End: 2024-06-28

## 2024-06-23 RX ORDER — CODEINE PHOSPHATE AND GUAIFENESIN 10; 100 MG/5ML; MG/5ML
5 SOLUTION ORAL 3 TIMES DAILY PRN
Qty: 120 ML | Refills: 0 | Status: SHIPPED | OUTPATIENT
Start: 2024-06-23

## 2024-06-23 RX ORDER — PREDNISONE 20 MG/1
40 TABLET ORAL DAILY
Qty: 10 TABLET | Refills: 0 | Status: SHIPPED | OUTPATIENT
Start: 2024-06-23 | End: 2024-06-28

## 2024-06-23 RX ORDER — ALBUTEROL SULFATE 90 UG/1
2 INHALANT RESPIRATORY (INHALATION) EVERY 6 HOURS PRN
Qty: 18 G | Refills: 0 | Status: SHIPPED | OUTPATIENT
Start: 2024-06-23

## 2024-06-23 ASSESSMENT — PAIN SCALES - GENERAL: PAINLEVEL: 1

## 2024-06-23 NOTE — PROGRESS NOTES
"Subjective   Chief Complaint   Patient presents with    URI     C/O dry cough, hoarseness, tickle in throat and ear pain when swallowing.  She had a fever 1 night.  Symptoms began about 2 weeks ago.         HPI    Tamiko Tiwari is a 49 y.o. female who presents for evaluation of cough, hoarse voice, and ear pain.  Patient states his initial began approximately 2 days ago with cough, congestion runny nose.  States her sinus symptoms have improved, but now she has a dry and harsh nonproductive cough that is keeping her up at night.  She states she has a \"tickle\" in her throat and bilateral ear pain and pressure, prickly when swallowing.  She denies any chest pain, shortness of breath, wheezing, nausea or vomiting.  She has been taking over-the-counter agents for symptom management along with resuming her treatment with Wixela and as needed albuterol inhaler.  She states she has a remote history of diagnosed adult onset eosinophilic asthma, but states that she has not been taking treatment for quite some time, stating her symptoms have been well-controlled without them..      The following have been reviewed and updated as appropriate in this visit:          Allergies   Allergen Reactions    Adhesive Tape-Silicones Rash     Drape adhesive     Current Outpatient Medications   Medication Sig Dispense Refill    levothyroxine (SYNTHROID, LEVOTHROID) 150 mcg tablet TAKE 1 TABLET BY MOUTH DAILY 90 tablet 2    losartan (COZAAR) 50 mg tablet Take 1 tablet (50 mg total) by mouth daily 90 tablet 2    sertraline (ZOLOFT) 100 mg tablet Take 1 tablet (100 mg total) by mouth daily 90 tablet 3    spironolactone (ALDACTONE) 50 mg tablet Take 1 tablet (50 mg total) by mouth daily 90 tablet 3    dupilumab 300 mg/2 mL pen injector Inject 1 pen (300mg) subcutaneously every 2 weeks. 4 mL 11    cholecalciferol, vitamin D3, 125 mcg (5,000 unit) tablet Take 1 tablet (5,000 Units total) by mouth daily      predniSONE (DELTASONE) 20 mg tablet " Take 2 tablets (40 mg total) by mouth daily for 5 days 10 tablet 0    benzonatate (TESSALON) 200 mg capsule Take 1 capsule (200 mg total) by mouth 3 (three) times a day as needed for cough for up to 5 days 20 capsule 0    codeine-guaiFENesin (ROBITUSSIN-AC)  mg/5 mL liquid Take 5 mL by mouth 3 (three) times a day as needed for cough Max Daily Amount: 15 mL 120 mL 0    albuterol HFA 90 mcg/actuation inhaler Inhale 2 puffs every 6 (six) hours as needed for wheezing 18 g 0    fluticasone propion-salmeteroL (Wixela Inhub) 250-50 mcg/dose diskus inhaler Inhale 1 puff 2 (two) times a day Rinse mouth with water after use. Do not swallow. 60 each 0    ferrous sulfate 325 mg (65 mg iron) tablet Take 1 tablet (325 mg total) by mouth daily      albuterol HFA (PROVENTIL HFA;VENTOLIN HFA) 90 mcg/actuation inhaler Inhale 2 puffs every 2 (two) hours as needed for wheezing (every 2-3 hours as needed for cough/wheezing) (Patient not taking: Reported on 2024) 1 Inhaler 1     No current facility-administered medications for this visit.     Past Medical History:   Diagnosis Date    Allergic Pollen -     B12 deficiency 2022    Complex ovarian cyst     Depression     Endocrine disorder     Hypertension     Hypothyroid     Iron deficiency 2020    Psychiatric illness     depression    Respiratory disease     Severe persistent asthma with acute exacerbation 2020    Eosinophilic asthma; [place]     Past Surgical History:   Procedure Laterality Date     SECTION      CHOLECYSTECTOMY N/A 10/26/2022    Procedure: LAPAROSCOPIC CHOLECYSTECTOMY;  Surgeon: Joseph Altamirano MD;  Location: [facility] OR;  Service: General;  Laterality: N/A;    LAPAROSCOPIC HYSTERECTOMY N/A 2021    Procedure: LAPAROSCOPIC HYSTERECTOMY, right salpingo-oophorectomy, left salpingectomy, cystoscopy, omental biopsy.;  Surgeon: April Cao MD;  Location: [facility] OR;  Service: Gynecology;  Laterality: N/A;    NASAL TURBINATE REDUCTION  Bilateral 11/17/2021    Procedure: BILATERAL INFERIOR TURBINATE REDUCTION;  Surgeon: Ally Carmen MD;  Location: \[facility]\"" SURGICAL SERVICES;  Service: ENT;  Laterality: Bilateral;    SINUS SURGERY Bilateral 11/17/2021    Procedure: FUNCTIONAL ENDOSCOPIC SINUS SURGERY WITH COMPUTER NAVIGATION - BILATERAL MAXILLAY and  ANTERIOR/POSTERIOR ETHMOID and SPHENOID  AND FRONTAL and BILATERAL INFERIOR TURBINATE REDUCTION (2 HR);  Surgeon: Ally Carmen MD;  Location: \[facility]\"" SURGICAL SERVICES;  Service: ENT;  Laterality: Bilateral;    SINUS SURGERY  11/17/2021     Family History   Problem Relation Age of Onset    Hypertension Mother     Prostate cancer Father     Other Sister         Hysterectomy due to benign tumors     Tachycardia Sister     Hypertension Brother     Heart failure Maternal Grandmother     Alcohol abuse Maternal Grandfather     Pacemaker/AICD Paternal Grandmother     Alcohol abuse Paternal Grandfather     Asthma Son     Eczema Son     Cancer Mother's Sister         unsure of what type    Other Mother's Brother     Stroke Mother's Brother     Multiple sclerosis Father's Brother     Ovarian cancer Neg Hx     Pancreatic cancer Neg Hx     Melanoma Neg Hx     Breast cancer Neg Hx     Uterine cancer Neg Hx      Social History     Socioeconomic History    Marital status:      Spouse name: Carl    Number of children: 1    Highest education level: Bachelor's degree (e.g., BA, AB, BS)   Occupational History    Occupation:      Employer: [employer]   Tobacco Use    Smoking status: Never    Smokeless tobacco: Never   Vaping Use    Vaping status: Never Used   Substance and Sexual Activity    Alcohol use: Yes     Comment: occasiona- social    Drug use: No    Sexual activity: Yes     Partners: Male     Birth control/protection: Condom Male     Social Determinants of Health     Tobacco Use: Low Risk  (6/23/2024)    Patient History     Smoking Tobacco Use: Never     Smokeless Tobacco Use: Never   Alcohol  Use: Alcohol Misuse (8/19/2020)    AUDIT-C     Frequency of Alcohol Consumption: 2-4 times a month     Average Number of Drinks: 3 or 4     Frequency of Binge Drinking: Never   Financial Resource Strain: Low Risk  (8/19/2020)    Overall Financial Resource Strain (CARDIA)     Difficulty of Paying Living Expenses: Not very hard   Food Insecurity: No Food Insecurity (8/19/2020)    Hunger Vital Sign     Worried About Running Out of Food in the Last Year: Never true     Ran Out of Food in the Last Year: Never true   Transportation Needs: No Transportation Needs (8/19/2020)    PRAPARE - Transportation     Lack of Transportation (Medical): No     Lack of Transportation (Non-Medical): No   Physical Activity: Inactive (8/19/2020)    Exercise Vital Sign     Days of Exercise per Week: 0 days     Minutes of Exercise per Session: 0 min   Stress: Stress Concern Present (8/19/2020)    [employer] - Occupational Stress Questionnaire     Feeling of Stress : To some extent   Social Connections: Unknown (8/19/2020)    Social Connection and Isolation Panel [NHANES]     Frequency of Communication with Friends and Family: Once a week     Attends Taoism Services: More than 4 times per year     Active Member of Clubs or Organizations: Yes     Attends Club or Organization Meetings: More than 4 times per year     Marital Status:    Housing Stability: High Risk (8/19/2020)    Housing Stability Vital Sign     Unable to Pay for Housing in the Last Year: Yes     Number of Places Lived in the Last Year: 1     Unstable Housing in the Last Year: No       Review of Systems  Please see HPI    Objective     Vitals:  /81   Pulse 91   Temp 36.5 °C (97.7 °F) (Temporal)   Resp 16   Wt 85.7 kg (189 lb)   LMP 10/23/2021   SpO2 96%   BMI 29.60 kg/m²     Physical Exam  Vitals and nursing note reviewed.   Constitutional:       General: She is not in acute distress.     Appearance: Normal appearance. She is  normal weight. She is ill-appearing. She is not toxic-appearing or diaphoretic.   HENT:      Head: Normocephalic and atraumatic.      Right Ear: Tympanic membrane, ear canal and external ear normal. There is no impacted cerumen.      Left Ear: Tympanic membrane, ear canal and external ear normal. There is no impacted cerumen.      Nose: Mucosal edema and rhinorrhea (Scant clear drainage) present. No congestion.      Mouth/Throat:      Mouth: Mucous membranes are moist.      Pharynx: Oropharynx is clear. No oropharyngeal exudate or posterior oropharyngeal erythema.   Eyes:      Pupils: Pupils are equal, round, and reactive to light.   Cardiovascular:      Rate and Rhythm: Normal rate and regular rhythm.      Pulses: Normal pulses.      Heart sounds: Normal heart sounds. No murmur heard.  Pulmonary:      Effort: Pulmonary effort is normal. No respiratory distress.      Breath sounds: Normal breath sounds. No stridor. No wheezing, rhonchi or rales.   Chest:      Chest wall: No tenderness.   Musculoskeletal:      Cervical back: Normal range of motion and neck supple. No tenderness.   Lymphadenopathy:      Cervical: No cervical adenopathy.   Skin:     General: Skin is warm and dry.      Capillary Refill: Capillary refill takes less than 2 seconds.   Neurological:      General: No focal deficit present.      Mental Status: She is alert and oriented to person, place, and time. Mental status is at baseline.   Psychiatric:         Mood and Affect: Mood normal.         Behavior: Behavior normal.         Thought Content: Thought content normal.         No results found for this or any previous visit (from the past 4 hour(s)).      Assessment/Plan   Diagnoses and all orders for this visit:    Bronchitis  -     predniSONE (DELTASONE) 20 mg tablet; Take 2 tablets (40 mg total) by mouth daily for 5 days  -     benzonatate (TESSALON) 200 mg capsule; Take 1 capsule (200 mg total) by mouth 3 (three) times a day as needed for cough  for up to 5 days  -     codeine-guaiFENesin (ROBITUSSIN-AC)  mg/5 mL liquid; Take 5 mL by mouth 3 (three) times a day as needed for cough Max Daily Amount: 15 mL  -     albuterol HFA 90 mcg/actuation inhaler; Inhale 2 puffs every 6 (six) hours as needed for wheezing  -     fluticasone propion-salmeteroL (Wixela Inhub) 250-50 mcg/dose diskus inhaler; Inhale 1 puff 2 (two) times a day Rinse mouth with water after use. Do not swallow.    History of asthma  -     predniSONE (DELTASONE) 20 mg tablet; Take 2 tablets (40 mg total) by mouth daily for 5 days  -     benzonatate (TESSALON) 200 mg capsule; Take 1 capsule (200 mg total) by mouth 3 (three) times a day as needed for cough for up to 5 days  -     codeine-guaiFENesin (ROBITUSSIN-AC)  mg/5 mL liquid; Take 5 mL by mouth 3 (three) times a day as needed for cough Max Daily Amount: 15 mL  -     albuterol HFA 90 mcg/actuation inhaler; Inhale 2 puffs every 6 (six) hours as needed for wheezing  -     fluticasone propion-salmeteroL (Wixela Inhub) 250-50 mcg/dose diskus inhaler; Inhale 1 puff 2 (two) times a day Rinse mouth with water after use. Do not swallow.        Discussion:    Patient's vital signs are stable at this time.  She is afebrile, in no acute distress.  SpO2 is 96% on room air, lung sounds are clear to auscultation at this time.  Reviewed with patient that her symptoms and assessment findings are consistent with acute bronchitis, and this may also be the beginning of an asthma exacerbation given her history.  Recommend proceeding with treatment today to include prednisone, Tessalon Perles, Cheratussin, and I will provide patient with refills of her Wixela and albuterol inhalers as well.  We discussed ordering a chest x-ray, mutual decision was made to hold off for the time being.    Recommend that she also utilize some fluticasone nasal spray 2 sprays per nostril once a day, may use over-the-counter Mucinex, antihistamine.    Advised patient to  follow-up with primary care in the next 2 weeks.  If symptoms worsen or fail to improve she is welcome to return to urgent care.  Patient states understanding of information instructions and agrees with plan of care at this time, questions answered.    Patient Education: Ready to learn, no apparent learning barriers were identified; learning preference includes listening.  Explained diagnosis and treatment plan; patient/caregiver expressed understanding of the content. All questions answered.       No follow-ups on file.  Maria A Malhotra, CNP

## 2024-06-24 NOTE — PATIENT INSTRUCTIONS
1.  Start prednisone.  Take the steroid as directed.  - Use Tessalon Perles as needed for cough.  - Use  Cheratussin for cough at nighttime.  Monitor for sedation as a side effect.    2.  Refills of your Wixela and albuterol inhaler were sent to pharmacy.  Use these as directed.  - Heart over-the-counter products like Mucinex for cough and chest congestion.    3.  Utilize fluticasone nasal spra 2 sprays per nostril once a day.    4.  Increase hydration noncaffeinated fluids at least 64 ounces per day.    5. If your symptoms worsen or fail to improve with the therapies, follow-up reevaluation in the primary care setting or back in urgent care for

## 2024-06-27 ENCOUNTER — OFFICE VISIT (OUTPATIENT)
Dept: URGENT CARE | Facility: URGENT CARE | Age: 49
End: 2024-06-27
Payer: COMMERCIAL

## 2024-06-27 ENCOUNTER — ANCILLARY PROCEDURE (OUTPATIENT)
Dept: RADIOLOGY | Facility: URGENT CARE | Age: 49
End: 2024-06-27
Payer: COMMERCIAL

## 2024-06-27 VITALS
WEIGHT: 184 LBS | TEMPERATURE: 98.7 F | HEIGHT: 66 IN | DIASTOLIC BLOOD PRESSURE: 97 MMHG | HEART RATE: 93 BPM | RESPIRATION RATE: 18 BRPM | OXYGEN SATURATION: 95 % | BODY MASS INDEX: 29.57 KG/M2 | SYSTOLIC BLOOD PRESSURE: 150 MMHG

## 2024-06-27 DIAGNOSIS — H10.33 ACUTE CONJUNCTIVITIS OF BOTH EYES, UNSPECIFIED ACUTE CONJUNCTIVITIS TYPE: ICD-10-CM

## 2024-06-27 DIAGNOSIS — J32.9 SINOBRONCHITIS: ICD-10-CM

## 2024-06-27 DIAGNOSIS — J40 SINOBRONCHITIS: ICD-10-CM

## 2024-06-27 DIAGNOSIS — J45.40 MODERATE PERSISTENT ASTHMA, UNSPECIFIED WHETHER COMPLICATED: ICD-10-CM

## 2024-06-27 DIAGNOSIS — R05.2 SUBACUTE COUGH: Primary | ICD-10-CM

## 2024-06-27 LAB
ALBUMIN SERPL-MCNC: 3.5 G/DL (ref 3.3–5.5)
ALP SERPL-CCNC: 87 U/L (ref 42–141)
ALT SERPL-CCNC: 25 U/L (ref 10–47)
ANION GAP SERPL CALC-SCNC: 11 MMOL/L (ref 3–11)
AST SERPL-CCNC: 27 U/L
BASOPHILS # BLD AUTO: 0.03 10*3/UL
BASOPHILS NFR BLD AUTO: 0.3 % (ref 0–2)
BILIRUB SERPL-MCNC: 0.5 MG/DL (ref 0.2–1.4)
BUN SERPL-MCNC: 11 MG/DL (ref 7–25)
CALCIUM ALBUM COR SERPL-MCNC: 10.3 MG/DL (ref 8.6–10.3)
CALCIUM SERPL-MCNC: 9.9 MG/DL (ref 8.6–10.3)
CHLORIDE SERPL-SCNC: 106 MMOL/L (ref 98–107)
CO2 SERPL-SCNC: 29 MMOL/L (ref 21–32)
CREAT SERPL-MCNC: 1 MG/DL (ref 0.6–1.1)
EGFRCR SERPLBLD CKD-EPI 2021: 69 ML/MIN/1.73M*2
EOSINOPHIL # BLD AUTO: 0.17 10*3/UL
EOSINOPHIL NFR BLD AUTO: 1.6 % (ref 0–3)
ERYTHROCYTE [DISTWIDTH] IN BLOOD BY AUTOMATED COUNT: 12.9 % (ref 11.5–14)
GLUCOSE SERPL-MCNC: 81 MG/DL (ref 70–105)
HCT VFR BLD AUTO: 46.1 % (ref 34–45)
HGB BLD-MCNC: 15.4 G/DL (ref 11.5–15.5)
LYMPHOCYTES # BLD AUTO: 2.89 10*3/UL
LYMPHOCYTES NFR BLD AUTO: 27.8 % (ref 11–47)
MCH RBC QN AUTO: 30.7 PG (ref 28–33)
MCHC RBC AUTO-ENTMCNC: 33.4 G/DL (ref 32–36)
MCV RBC AUTO: 91.8 FL (ref 81–97)
MONOCYTES # BLD AUTO: 1.07 10*3/UL
MONOCYTES NFR BLD AUTO: 10.3 % (ref 3–11)
NEUTROPHILS # BLD AUTO: 6.22 10*3/UL
NEUTROPHILS NFR BLD AUTO: 59.9 % (ref 41–81)
PLATELET # BLD AUTO: 347 10*3/UL (ref 140–350)
PMV BLD AUTO: 7.5 FL (ref 6.9–10.8)
POTASSIUM SERPL-SCNC: 3.5 MMOL/L (ref 3.5–5.1)
PROT SERPL-MCNC: 7.5 G/DL (ref 6.4–8.1)
RBC # BLD AUTO: 5.02 10*6/ΜL (ref 3.7–5.3)
SODIUM SERPL-SCNC: 146 MMOL/L (ref 128–145)
WBC # BLD AUTO: 10.4 10*3/UL (ref 4.5–10.5)

## 2024-06-27 PROCEDURE — 71046 X-RAY EXAM CHEST 2 VIEWS: CPT | Mod: TC | Performed by: NURSE PRACTITIONER

## 2024-06-27 PROCEDURE — 85025 COMPLETE CBC W/AUTO DIFF WBC: CPT | Performed by: NURSE PRACTITIONER

## 2024-06-27 PROCEDURE — 80053 COMPREHEN METABOLIC PANEL: CPT | Performed by: NURSE PRACTITIONER

## 2024-06-27 PROCEDURE — 94640 AIRWAY INHALATION TREATMENT: CPT | Performed by: NURSE PRACTITIONER

## 2024-06-27 PROCEDURE — 99214 OFFICE O/P EST MOD 30 MIN: CPT | Mod: 25 | Performed by: NURSE PRACTITIONER

## 2024-06-27 PROCEDURE — 36415 COLL VENOUS BLD VENIPUNCTURE: CPT | Performed by: NURSE PRACTITIONER

## 2024-06-27 RX ORDER — PREDNISONE 10 MG/1
TABLET ORAL
Qty: 13 TABLET | Refills: 0 | Status: SHIPPED | OUTPATIENT
Start: 2024-06-27

## 2024-06-27 RX ORDER — AMOXICILLIN AND CLAVULANATE POTASSIUM 875; 125 MG/1; MG/1
1 TABLET, FILM COATED ORAL 2 TIMES DAILY
Qty: 20 TABLET | Refills: 0 | Status: SHIPPED | OUTPATIENT
Start: 2024-06-27 | End: 2024-07-07

## 2024-06-27 RX ORDER — IPRATROPIUM BROMIDE AND ALBUTEROL SULFATE 2.5; .5 MG/3ML; MG/3ML
3 SOLUTION RESPIRATORY (INHALATION) ONCE
Status: COMPLETED | OUTPATIENT
Start: 2024-06-27 | End: 2024-06-27

## 2024-06-27 RX ORDER — POLYMYXIN B SULFATE AND TRIMETHOPRIM 1; 10000 MG/ML; [USP'U]/ML
1 SOLUTION OPHTHALMIC EVERY 6 HOURS
Qty: 10 ML | Refills: 0 | Status: SHIPPED | OUTPATIENT
Start: 2024-06-27 | End: 2024-07-07

## 2024-06-27 RX ORDER — IPRATROPIUM BROMIDE AND ALBUTEROL SULFATE 2.5; .5 MG/3ML; MG/3ML
3 SOLUTION RESPIRATORY (INHALATION) EVERY 4 HOURS PRN
Status: DISCONTINUED | OUTPATIENT
Start: 2024-06-27 | End: 2024-06-27

## 2024-06-27 RX ADMIN — IPRATROPIUM BROMIDE AND ALBUTEROL SULFATE 3 ML: 2.5; .5 SOLUTION RESPIRATORY (INHALATION) at 10:27

## 2024-06-27 ASSESSMENT — PAIN SCALES - GENERAL: PAINLEVEL: 3

## 2024-06-27 NOTE — PROGRESS NOTES
Subjective   Chief Complaint   Patient presents with    Cough     CO, cough, eyes crusted over this morning, fever, vomiting on tuesday, sore throat, coughing up green phlegm. Pt does state that her sinuses feel better though.   Started: Over two weeks, but progressive  OTC meds taken: Seen on 6/23/24        HPI    aTmiko Tiwari is a 49 y.o. female who presents for evaluation of worsening cough, eye crusting and discharge, sore throat and vomiting.  Patient symptoms began approximately 2 weeks ago.  She was evaluated for the symptoms on 6/23/2024, where she was diagnosed with an acute asthma exacerbation versus bronchitis.  She was prescribed prednisone, benzonatate, Cheratussin, albuterol inhaler, and a refill of her Wixela was provided as well.  Since that time, she has had worsening cough symptoms.  She states her cough is now productive with copious amounts of thick green sputum.  She states her sinus pressure has improved slightly, but she continues to have purulent postnasal drainage and secretions.  She woke up this morning with bilateral eyes crusted shut.  She does  endorse bilateral eye itching and irritation.  She does have some intermittent wheezing and shortness of breath.  No chest pain or chest pressure.      The following have been reviewed and updated as appropriate in this visit:          Allergies   Allergen Reactions    Adhesive Tape-Silicones Rash     Drape adhesive     Current Outpatient Medications   Medication Sig Dispense Refill    amoxicillin-pot clavulanate (AUGMENTIN) 875-125 mg per tablet Take 1 tablet by mouth 2 (two) times a day for 10 days 20 tablet 0    polymyxin B sulfate-trimethoprim (Polytrim) ophthalmic solution Administer 1 drop into both eyes every 6 (six) hours for 10 days 10 mL 0    predniSONE 10 mg tablet Take 3 tablets daily for 2 days; then 2 tablets daily for 2 days then 1 tablet daily for 2 days then half tablet daily for 2 days then stop 13 tablet 0    predniSONE  (DELTASONE) 20 mg tablet Take 2 tablets (40 mg total) by mouth daily for 5 days 10 tablet 0    benzonatate (TESSALON) 200 mg capsule Take 1 capsule (200 mg total) by mouth 3 (three) times a day as needed for cough for up to 5 days 20 capsule 0    codeine-guaiFENesin (ROBITUSSIN-AC)  mg/5 mL liquid Take 5 mL by mouth 3 (three) times a day as needed for cough Max Daily Amount: 15 mL 120 mL 0    albuterol HFA 90 mcg/actuation inhaler Inhale 2 puffs every 6 (six) hours as needed for wheezing 18 g 0    fluticasone propion-salmeteroL (Wixela Inhub) 250-50 mcg/dose diskus inhaler Inhale 1 puff 2 (two) times a day Rinse mouth with water after use. Do not swallow. 60 each 0    levothyroxine (SYNTHROID, LEVOTHROID) 150 mcg tablet TAKE 1 TABLET BY MOUTH DAILY 90 tablet 2    losartan (COZAAR) 50 mg tablet Take 1 tablet (50 mg total) by mouth daily 90 tablet 2    sertraline (ZOLOFT) 100 mg tablet Take 1 tablet (100 mg total) by mouth daily 90 tablet 3    spironolactone (ALDACTONE) 50 mg tablet Take 1 tablet (50 mg total) by mouth daily 90 tablet 3    dupilumab 300 mg/2 mL pen injector Inject 1 pen (300mg) subcutaneously every 2 weeks. 4 mL 11    ferrous sulfate 325 mg (65 mg iron) tablet Take 1 tablet (325 mg total) by mouth daily      cholecalciferol, vitamin D3, 125 mcg (5,000 unit) tablet Take 1 tablet (5,000 Units total) by mouth daily      albuterol HFA (PROVENTIL HFA;VENTOLIN HFA) 90 mcg/actuation inhaler Inhale 2 puffs every 2 (two) hours as needed for wheezing (every 2-3 hours as needed for cough/wheezing) (Patient not taking: Reported on 6/23/2024) 1 Inhaler 1     No current facility-administered medications for this visit.     Past Medical History:   Diagnosis Date    Allergic Pollen - 2019    B12 deficiency 12/27/2022    Complex ovarian cyst     Depression     Endocrine disorder     Hypertension     Hypothyroid     Iron deficiency 9/25/2020    Psychiatric illness     depression    Respiratory disease     Severe  persistent asthma with acute exacerbation 2020    Eosinophilic asthma; [place]     Past Surgical History:   Procedure Laterality Date     SECTION      CHOLECYSTECTOMY N/A 10/26/2022    Procedure: LAPAROSCOPIC CHOLECYSTECTOMY;  Surgeon: Joseph Altamirano MD;  Location: [facility] OR;  Service: General;  Laterality: N/A;    LAPAROSCOPIC HYSTERECTOMY N/A 2021    Procedure: LAPAROSCOPIC HYSTERECTOMY, right salpingo-oophorectomy, left salpingectomy, cystoscopy, omental biopsy.;  Surgeon: April Cao MD;  Location: [facility] OR;  Service: Gynecology;  Laterality: N/A;    NASAL TURBINATE REDUCTION Bilateral 2021    Procedure: BILATERAL INFERIOR TURBINATE REDUCTION;  Surgeon: Ally Carmen MD;  Location: [facility];  Service: ENT;  Laterality: Bilateral;    SINUS SURGERY Bilateral 2021    Procedure: FUNCTIONAL ENDOSCOPIC SINUS SURGERY WITH COMPUTER NAVIGATION - BILATERAL MAXILLAY and  ANTERIOR/POSTERIOR ETHMOID and SPHENOID  AND FRONTAL and BILATERAL INFERIOR TURBINATE REDUCTION (2 HR);  Surgeon: Ally Carmen MD;  Location: [facility];  Service: ENT;  Laterality: Bilateral;    SINUS SURGERY  2021     Family History   Problem Relation Age of Onset    Hypertension Mother     Prostate cancer Father     Other Sister         Hysterectomy due to benign tumors     Tachycardia Sister     Hypertension Brother     Heart failure Maternal Grandmother     Alcohol abuse Maternal Grandfather     Pacemaker/AICD Paternal Grandmother     Alcohol abuse Paternal Grandfather     Asthma Son     Eczema Son     Cancer Mother's Sister         unsure of what type    Other Mother's Brother     Stroke Mother's Brother     Multiple sclerosis Father's Brother     Ovarian cancer Neg Hx     Pancreatic cancer Neg Hx     Melanoma Neg Hx     Breast cancer Neg Hx     Uterine cancer Neg Hx      Social History     Socioeconomic History    Marital status:      Spouse name: Carl    Number of children:  1    Highest education level: Bachelor's degree (e.g., BA, AB, BS)   Occupational History    Occupation:      Employer: [employer]   Tobacco Use    Smoking status: Never    Smokeless tobacco: Never   Vaping Use    Vaping status: Never Used   Substance and Sexual Activity    Alcohol use: Yes     Comment: occasiona- social    Drug use: No    Sexual activity: Yes     Partners: Male     Birth control/protection: Condom Male     Social Determinants of Health     Tobacco Use: Low Risk  (6/23/2024)    Patient History     Smoking Tobacco Use: Never     Smokeless Tobacco Use: Never   Alcohol Use: Alcohol Misuse (8/19/2020)    AUDIT-C     Frequency of Alcohol Consumption: 2-4 times a month     Average Number of Drinks: 3 or 4     Frequency of Binge Drinking: Never   Financial Resource Strain: Low Risk  (8/19/2020)    Overall Financial Resource Strain (CARDIA)     Difficulty of Paying Living Expenses: Not very hard   Food Insecurity: No Food Insecurity (8/19/2020)    Hunger Vital Sign     Worried About Running Out of Food in the Last Year: Never true     Ran Out of Food in the Last Year: Never true   Transportation Needs: No Transportation Needs (8/19/2020)    PRAPARE - Transportation     Lack of Transportation (Medical): No     Lack of Transportation (Non-Medical): No   Physical Activity: Inactive (8/19/2020)    Exercise Vital Sign     Days of Exercise per Week: 0 days     Minutes of Exercise per Session: 0 min   Stress: Stress Concern Present (8/19/2020)    [employer] - Occupational Stress Questionnaire     Feeling of Stress : To some extent   Social Connections: Unknown (8/19/2020)    Social Connection and Isolation Panel [NHANES]     Frequency of Communication with Friends and Family: Once a week     Attends Spiritism Services: More than 4 times per year     Active Member of Clubs or Organizations: Yes     Attends Club or Organization Meetings: More than 4 times per year     " Marital Status:    Housing Stability: High Risk (8/19/2020)    Housing Stability Vital Sign     Unable to Pay for Housing in the Last Year: Yes     Number of Places Lived in the Last Year: 1     Unstable Housing in the Last Year: No       Review of Systems  Please see HPI    Objective     Vitals:  /97 (BP Location: Left arm, Patient Position: Sitting, Cuff Size: Regular Adult)   Pulse 93   Temp 37.1 °C (98.7 °F) (Temporal)   Resp 18   Ht 1.676 m (5' 6\")   Wt 83.5 kg (184 lb)   LMP 10/23/2021   SpO2 95%   BMI 29.70 kg/m²     Physical Exam  Vitals and nursing note reviewed.   Constitutional:       General: She is not in acute distress.     Appearance: Normal appearance. She is ill-appearing. She is not toxic-appearing or diaphoretic.   HENT:      Head: Normocephalic and atraumatic.      Right Ear: Tympanic membrane, ear canal and external ear normal. There is no impacted cerumen.      Left Ear: Tympanic membrane, ear canal and external ear normal. There is no impacted cerumen.      Nose: Mucosal edema, congestion and rhinorrhea present. Rhinorrhea is purulent.      Right Turbinates: Enlarged.      Left Turbinates: Enlarged.      Mouth/Throat:      Mouth: Mucous membranes are moist.      Pharynx: Oropharynx is clear. Posterior oropharyngeal erythema present. No oropharyngeal exudate.   Eyes:      General:         Right eye: No foreign body, discharge or hordeolum.         Left eye: Discharge present.No foreign body or hordeolum.      Conjunctiva/sclera:      Right eye: Right conjunctiva is injected. No chemosis, exudate or hemorrhage.     Left eye: Left conjunctiva is injected. No chemosis, exudate or hemorrhage.     Pupils: Pupils are equal, round, and reactive to light.      Comments: Mild edema and erythema noted to the bilateral upper eyelids.  No tenderness, mass or lesion noted   Cardiovascular:      Rate and Rhythm: Normal rate and regular rhythm.      Pulses: Normal pulses.      Heart " sounds: Normal heart sounds. No murmur heard.  Pulmonary:      Effort: Pulmonary effort is normal. No respiratory distress.      Breath sounds: No stridor. No wheezing, rhonchi or rales.      Comments: Harsh cough elicited upon deep inspiration  Chest:      Chest wall: No tenderness.   Musculoskeletal:      Cervical back: Normal range of motion and neck supple. No rigidity or tenderness.   Lymphadenopathy:      Cervical: No cervical adenopathy.   Skin:     General: Skin is warm and dry.      Capillary Refill: Capillary refill takes less than 2 seconds.   Neurological:      General: No focal deficit present.      Mental Status: She is alert and oriented to person, place, and time. Mental status is at baseline.   Psychiatric:         Mood and Affect: Mood normal.         Behavior: Behavior normal.         Thought Content: Thought content normal.         Recent Results (from the past 4 hour(s))   CBC w/auto differential Blood, Venous    Collection Time: 06/27/24 10:13 AM   Result Value Ref Range    WBC 10.4 4.5 - 10.5 10*3/uL    RBC 5.02 3.70 - 5.30 10*6/µL    Hemoglobin 15.4 11.5 - 15.5 g/dL    Hematocrit 46.1 (H) 34.0 - 45.0 %    MCV 91.8 81.0 - 97.0 fL    MCH 30.7 28.0 - 33.0 pg    MCHC 33.4 32.0 - 36.0 g/dL    RDW 12.9 11.5 - 14.0 %    Platelets 347 140 - 350 10*3/uL    MPV 7.5 6.9 - 10.8 fL    Neutrophils% 59.9 41.0 - 81.0 %    Lymphocytes% 27.8 11.0 - 47.0 %    Monocytes% 10.3 3.0 - 11.0 %    Eosinophils% 1.6 0.0 - 3.0 %    Basophils% 0.3 0.0 - 2.0 %    ANC (auto diff) 6.22 10*3/UL    Lymphocytes Absolute 2.89 10*3/uL    Monocytes Absolute 1.07 10*3/uL    Eosinophils Absolute 0.17 10*3/uL    Basophils Absolute 0.03 10*3/uL   Comprehensive metabolic panel Blood, Venous    Collection Time: 06/27/24 10:13 AM   Result Value Ref Range    Sodium 146 (H) 128 - 145 mmol/L    Potassium 3.5 3.5 - 5.1 MMOL/L    Chloride 106 98 - 107 mmol/L    CO2 29 21 - 32 mmol/L    Anion Gap 11 3 - 11 mmol/L    BUN 11 7 - 25 mg/dL     Creatinine 1.00 0.60 - 1.10 mg/dL    Glucose 81 70 - 105 mg/dL    Calcium 9.9 8.6 - 10.3 mg/dL    AST 27 <40 U/L    ALT (SGPT) 25 10 - 47 U/L    Alkaline Phosphatase 87 42 - 141 U/L    Total Protein 7.5 6.4 - 8.1 g/dL    Albumin 3.5 3.3 - 5.5 g/dL    Total Bilirubin 0.50 0.20 - 1.40 mg/dL    Corrected Calcium 10.3 8.6 - 10.3 mg/dL    eGFR 69 >60 mL/min/1.73m*2           Assessment/Plan   Diagnoses and all orders for this visit:    Subacute cough  -     CBC w/auto differential Blood, Venous  -     Comprehensive metabolic panel Blood, Venous  -     X-ray chest 2 views  -     ipratropium-albuteroL (DUO-NEB) 0.5-2.5 mg/3 mL nebulizer solution 3 mL    Moderate persistent asthma, unspecified whether complicated  -     CBC w/auto differential Blood, Venous  -     Comprehensive metabolic panel Blood, Venous  -     X-ray chest 2 views  -     ipratropium-albuteroL (DUO-NEB) 0.5-2.5 mg/3 mL nebulizer solution 3 mL  -     predniSONE 10 mg tablet; Take 3 tablets daily for 2 days; then 2 tablets daily for 2 days then 1 tablet daily for 2 days then half tablet daily for 2 days then stop    Acute conjunctivitis of both eyes, unspecified acute conjunctivitis type  -     polymyxin B sulfate-trimethoprim (Polytrim) ophthalmic solution; Administer 1 drop into both eyes every 6 (six) hours for 10 days    Sinobronchitis  -     amoxicillin-pot clavulanate (AUGMENTIN) 875-125 mg per tablet; Take 1 tablet by mouth 2 (two) times a day for 10 days  -     predniSONE 10 mg tablet; Take 3 tablets daily for 2 days; then 2 tablets daily for 2 days then 1 tablet daily for 2 days then half tablet daily for 2 days then stop        Discussion:  Patient's vital signs are stable at this time.  Patient has a slight elevation in blood pressure, is otherwise afebrile, well-hydrated nontoxic-appearing, and no acute distress.  She is ill-appearing, does have a notable cough on examination today. With her worsening symptoms, recommend proceeding with chest  x-ray, serum diagnostics.  Also administer neb treatment, given her history of asthma and intermittent wheezing symptoms.  She is agreeable to proceed.    CBC shows slight elevation in white blood cell count of 10.4, slight increase hematocrit 46.1, no other abnormality noted.  No left shift, eosinophilia.  Metabolic panel reveals stable findings overall aside from slightly decreased sodium.  Her chest x-ray is clear, no acute cardiopulmonary abnormality.    Patient does state some improvement with nebulizer treatment today. Recommend proceeding with treatment of sinobronchitis with Augmentin.  I will also extend her prednisone burst to include a taper.  I will also prescribe some Polytrim eyedrops, given she does have symptoms of conjunctivitis on examination today.  Symptoms are not consistent with preseptal cellulitis,  Although Augmentin will cover for this as well.   Advised patient to continue with Mucinex, Sudafed, nasal saline rinses, inhalers.  Recommend follow-up with primary care within the next week.  If symptoms worsen or change, she is welcome return to urgent care as well.  Instructions and when to seek emergency medical care reviewed at patient.  She states understanding of information instructions, agrees with plan of care, all questions answered.    Patient Education: Ready to learn, no apparent learning barriers were identified; learning preference includes listening.  Explained diagnosis and treatment plan; patient/caregiver expressed understanding of the content. All questions answered.       No follow-ups on file.  Maria A Malhotra, CNP

## 2024-06-28 DIAGNOSIS — J32.4 CHRONIC PANSINUSITIS: ICD-10-CM

## 2024-06-28 DIAGNOSIS — J33.9 NASAL POLYP: ICD-10-CM

## 2024-07-01 RX ORDER — DUPILUMAB 300 MG/2ML
300 INJECTION, SOLUTION SUBCUTANEOUS
Qty: 4 ML | Refills: 11 | OUTPATIENT
Start: 2024-07-01 | End: 2025-06-30

## 2024-07-08 DIAGNOSIS — J32.4 CHRONIC PANSINUSITIS: ICD-10-CM

## 2024-07-08 DIAGNOSIS — J33.9 NASAL POLYP: ICD-10-CM

## 2024-07-08 RX ORDER — DUPILUMAB 300 MG/2ML
300 INJECTION, SOLUTION SUBCUTANEOUS
Qty: 4 ML | Refills: 11 | OUTPATIENT
Start: 2024-07-08 | End: 2025-07-07

## 2024-07-11 ENCOUNTER — MEDICATION ACCESS (OUTPATIENT)
Dept: OTOLARYNGOLOGY | Facility: CLINIC | Age: 49
End: 2024-07-11
Payer: COMMERCIAL

## 2024-07-11 ENCOUNTER — MEDICATION ACCESS (OUTPATIENT)
Dept: DERMATOLOGY | Facility: CLINIC | Age: 49
End: 2024-07-11
Payer: COMMERCIAL

## 2024-07-11 DIAGNOSIS — J32.4 CHRONIC PANSINUSITIS: ICD-10-CM

## 2024-07-11 DIAGNOSIS — J33.9 NASAL POLYP: ICD-10-CM

## 2024-07-11 RX ORDER — DUPILUMAB 300 MG/2ML
300 INJECTION, SOLUTION SUBCUTANEOUS
Qty: 12 ML | Refills: 0 | Status: SHIPPED | OUTPATIENT
Start: 2024-07-11 | End: 2024-07-31 | Stop reason: SDUPTHER

## 2024-07-11 NOTE — PROGRESS NOTES
07/11/24  9:29 AM    Inquiry initiated from:   [x]Rx Only       11.17.24/Dupixent/Kermit WU renewal reminder scheduled for:   11.04.24        This Rx is approved and an authorization is on file.    No changes to the following:  Insurance   Prescriber  Medication Name   Dosage Strength  Dosage Form  Route of Administration   Frequency of Administration    Patient has not been notified, as there is no expected delay or interruption in therapy.      [id number]

## 2024-07-31 DIAGNOSIS — J33.9 NASAL POLYP: ICD-10-CM

## 2024-07-31 DIAGNOSIS — J32.4 CHRONIC PANSINUSITIS: ICD-10-CM

## 2024-07-31 RX ORDER — DUPILUMAB 300 MG/2ML
300 INJECTION, SOLUTION SUBCUTANEOUS
Qty: 12 ML | Refills: 0 | Status: SHIPPED | OUTPATIENT
Start: 2024-07-31 | End: 2024-08-13 | Stop reason: SDUPTHER

## 2024-08-01 ENCOUNTER — MEDICATION ACCESS (OUTPATIENT)
Dept: OTOLARYNGOLOGY | Facility: CLINIC | Age: 49
End: 2024-08-01
Payer: COMMERCIAL

## 2024-08-01 NOTE — PROGRESS NOTES
08/01/24  7:54 AM    Inquiry initiated from:   [x]Rx Only     11.17.2024/Dupixent/Kermit WU renewal reminder scheduled for: 11.04.2024    This Rx is approved and an authorization is on file.    No changes to the following:  Insurance   Prescriber  Medication Name   Dosage Strength  Dosage Form  Route of Administration   Frequency of Administration    Patient has not been notified, as there is no expected delay or interruption in therapy.

## 2024-08-13 ENCOUNTER — OFFICE VISIT (OUTPATIENT)
Dept: OTOLARYNGOLOGY | Facility: CLINIC | Age: 49
End: 2024-08-13
Payer: COMMERCIAL

## 2024-08-13 ENCOUNTER — MEDICATION ACCESS (OUTPATIENT)
Dept: OTOLARYNGOLOGY | Facility: CLINIC | Age: 49
End: 2024-08-13
Payer: COMMERCIAL

## 2024-08-13 VITALS
BODY MASS INDEX: 31.07 KG/M2 | HEART RATE: 99 BPM | TEMPERATURE: 98.4 F | RESPIRATION RATE: 18 BRPM | OXYGEN SATURATION: 94 % | WEIGHT: 192.5 LBS

## 2024-08-13 DIAGNOSIS — J32.4 CHRONIC PANSINUSITIS: Primary | ICD-10-CM

## 2024-08-13 DIAGNOSIS — J45.50 SEVERE PERSISTENT ASTHMA WITHOUT COMPLICATION: ICD-10-CM

## 2024-08-13 DIAGNOSIS — J33.9 NASAL POLYP: ICD-10-CM

## 2024-08-13 DIAGNOSIS — J30.9 ALLERGIC RHINITIS, UNSPECIFIED SEASONALITY, UNSPECIFIED TRIGGER: ICD-10-CM

## 2024-08-13 PROCEDURE — 31231 NASAL ENDOSCOPY DX: CPT | Performed by: OTOLARYNGOLOGY

## 2024-08-13 PROCEDURE — 99214 OFFICE O/P EST MOD 30 MIN: CPT | Mod: 25 | Performed by: OTOLARYNGOLOGY

## 2024-08-13 RX ORDER — DUPILUMAB 300 MG/2ML
300 INJECTION, SOLUTION SUBCUTANEOUS
Qty: 12 ML | Refills: 3 | Status: SHIPPED | OUTPATIENT
Start: 2024-08-13 | End: 2025-08-13

## 2024-08-13 ASSESSMENT — ENCOUNTER SYMPTOMS
RHINORRHEA: 0
TROUBLE SWALLOWING: 0
SORE THROAT: 0
SINUS PRESSURE: 0
CONSTITUTIONAL NEGATIVE: 1
CHILLS: 0
FEVER: 0

## 2024-08-13 ASSESSMENT — PAIN SCALES - GENERAL: PAINLEVEL: 0-NO PAIN

## 2024-08-13 NOTE — PROGRESS NOTES
08/13/24  4:15 PM    Inquiry initiated from:   [x]Rx Only       11.17.24/Dupixent/Kermit WU renewal reminder scheduled for:   11.04.24        This Rx is approved and an authorization is on file.    No changes to the following:  Insurance   Prescriber  Medication Name   Dosage Strength  Dosage Form  Route of Administration   Frequency of Administration    Patient has not been notified, as there is no expected delay or interruption in therapy.      [id number]

## 2024-08-13 NOTE — PROGRESS NOTES
Subjective    CC: Follow-up Dupixent    HPI: Tamiko Tiwari is a 49 y.o. female status post endoscopic sinus surgery for chronic sinusitis with polyps in 2021.  Subsequently in  she was started on Dupixent due to polyp regrowth.  She has done well on the medication since then and is here for her annual checkup.  She states her nose feels good.  The breathing is good.  There is no purulent discharge.  Her sense of smell is intact.  No infected discharge that she notes.    Past Medical History:   Diagnosis Date    Allergic Pollen -     B12 deficiency 2022    Complex ovarian cyst     Depression     Endocrine disorder     Hypertension     Hypothyroid     Iron deficiency 2020    Psychiatric illness     depression    Respiratory disease     Severe persistent asthma with acute exacerbation 2020    Eosinophilic asthma; [place]       Past Surgical History:   Procedure Laterality Date     SECTION      CHOLECYSTECTOMY N/A 10/26/2022    Procedure: LAPAROSCOPIC CHOLECYSTECTOMY;  Surgeon: Joseph Altamirano MD;  Location: [facility] OR;  Service: General;  Laterality: N/A;    LAPAROSCOPIC HYSTERECTOMY N/A 2021    Procedure: LAPAROSCOPIC HYSTERECTOMY, right salpingo-oophorectomy, left salpingectomy, cystoscopy, omental biopsy.;  Surgeon: April Cao MD;  Location: [facility] OR;  Service: Gynecology;  Laterality: N/A;    NASAL TURBINATE REDUCTION Bilateral 2021    Procedure: BILATERAL INFERIOR TURBINATE REDUCTION;  Surgeon: Ally Carmen MD;  Location: [facility]"" SURGICAL SERVICES;  Service: ENT;  Laterality: Bilateral;    SINUS SURGERY Bilateral 2021    Procedure: FUNCTIONAL ENDOSCOPIC SINUS SURGERY WITH COMPUTER NAVIGATION - BILATERAL MAXILLAY and  ANTERIOR/POSTERIOR ETHMOID and SPHENOID  AND FRONTAL and BILATERAL INFERIOR TURBINATE REDUCTION (2 HR);  Surgeon: Ally Carmen MD;  Location: [facility]"" SURGICAL SERVICES;  Service: ENT;  Laterality: Bilateral;    SINUS SURGERY  2021          Current Outpatient Medications:     dupilumab (Dupixent Pen) 300 mg/2 mL pen injector, Inject 1 pen (300mg) subcutaneously every 2 weeks., Disp: 12 mL, Rfl: 0    codeine-guaiFENesin (ROBITUSSIN-AC)  mg/5 mL liquid, Take 5 mL by mouth 3 (three) times a day as needed for cough Max Daily Amount: 15 mL, Disp: 120 mL, Rfl: 0    albuterol HFA 90 mcg/actuation inhaler, Inhale 2 puffs every 6 (six) hours as needed for wheezing, Disp: 18 g, Rfl: 0    fluticasone propion-salmeteroL (Wixela Inhub) 250-50 mcg/dose diskus inhaler, Inhale 1 puff 2 (two) times a day Rinse mouth with water after use. Do not swallow., Disp: 60 each, Rfl: 0    levothyroxine (SYNTHROID, LEVOTHROID) 150 mcg tablet, TAKE 1 TABLET BY MOUTH DAILY, Disp: 90 tablet, Rfl: 2    losartan (COZAAR) 50 mg tablet, Take 1 tablet (50 mg total) by mouth daily, Disp: 90 tablet, Rfl: 2    sertraline (ZOLOFT) 100 mg tablet, Take 1 tablet (100 mg total) by mouth daily, Disp: 90 tablet, Rfl: 3    spironolactone (ALDACTONE) 50 mg tablet, Take 1 tablet (50 mg total) by mouth daily, Disp: 90 tablet, Rfl: 3    ferrous sulfate 325 mg (65 mg iron) tablet, Take 1 tablet (325 mg total) by mouth daily, Disp: , Rfl:     cholecalciferol, vitamin D3, 125 mcg (5,000 unit) tablet, Take 1 tablet (5,000 Units total) by mouth daily, Disp: , Rfl:     predniSONE 10 mg tablet, Take 3 tablets daily for 2 days; then 2 tablets daily for 2 days then 1 tablet daily for 2 days then half tablet daily for 2 days then stop (Patient not taking: Reported on 8/13/2024), Disp: 13 tablet, Rfl: 0    albuterol HFA (PROVENTIL HFA;VENTOLIN HFA) 90 mcg/actuation inhaler, Inhale 2 puffs every 2 (two) hours as needed for wheezing (every 2-3 hours as needed for cough/wheezing) (Patient not taking: Reported on 6/23/2024), Disp: 1 Inhaler, Rfl: 1    Allergies   Allergen Reactions    Adhesive Tape-Silicones Rash     Drape adhesive       family history includes Alcohol abuse in her maternal grandfather  and paternal grandfather; Asthma in her son; Cancer in her mother's sister; Eczema in her son; Heart failure in her maternal grandmother; Hypertension in her brother and mother; Multiple sclerosis in her father's brother; Other in her mother's brother and sister; Pacemaker/AICD in her paternal grandmother; Prostate cancer in her father; Stroke in her mother's brother; Tachycardia in her sister.    Social History     Tobacco Use    Smoking status: Never    Smokeless tobacco: Never   Vaping Use    Vaping status: Never Used   Substance Use Topics    Alcohol use: Yes     Comment: occasiona- social    Drug use: No       Review of Systems   Constitutional: Negative.  Negative for chills and fever.   HENT: Negative.  Negative for congestion, ear discharge, ear pain, hearing loss, nosebleeds, postnasal drip, rhinorrhea, sinus pressure, sneezing, sore throat, tinnitus and trouble swallowing.        Objective    Pulse 99   Temp 36.9 °C (98.4 °F) (Temporal)   Resp 18   Wt 87.3 kg (192 lb 8 oz)   LMP 10/23/2021   SpO2 94%   BMI 31.07 kg/m²     PHYSICAL EXAMINATION:      GENERAL:  Well-developed, well-nourished.  The patient is alert, oriented and in no acute distress.        PSYCH: Normal mood and affect.        SKIN: No evidence of significant rash or concerning skin lesion on the head or neck.      HEAD/FACE:  Normally formed without evidence of mass or trauma.  The sinuses are nontender.        EARS: Bilateral external ears are normal.  Bilateral external auditory canals are normal.  Bilateral tympanic membranes intact and normal.      NOSE:  The external nose appears normal.  The septum is mostly midline.  The turbinates appear normal.  Some edema around the middle meatus on both sides.      ORAL CAVITY/OROPHARYNX:  There are no focal masses or lesions.  There are normal mucous membranes.  The tonsils appear normal without mass or focal lesion.  Oropharynx mucosa appears normal.      NECK:  There is no palpable  adenopathy, goiter or mass.  The trachea is midline.    Procedure: Diagnostic nasal endoscopy.  To further evaluate the patient's condition diagnostic nasal endoscopy was performed.  Anesthesia was with topical lidocaine and Afrin spray.  Nasal telescope was inserted through the nostril and into the right nasal cavity first and into the left.  The nasal cavity was examined bilaterally including the inferior turbinate, middle turbinate, superior turbinate, middle meatus and sphenoethmoid recess.  Pertinent findings include patent ethmoidectomy and maxillary sinusotomy visible bilaterally.  I was not able to advance the scope directly into the middle meatus due to narrow space and patient sensitivity.  The turbinates are in good position and are not lateralized.  No evidence of recurrent or residual polyps.  No evidence of infection.    Diagnosis    1. Chronic pansinusitis    2. Nasal polyp    3. Severe persistent asthma without complication    4. Allergic rhinitis, unspecified seasonality, unspecified trigger        Recommendations    Patient should continue the Dupixent for now for management of her chronic sinusitis with polyps and associated asthma.  Continue rinses and Flonase as well.  Follow-up with me in a year for repeat check, sooner as needed for signs of sinus infection that does not resolve within 10 to 14 days, worsening nasal obstruction or loss of sense of smell.

## 2024-09-17 ENCOUNTER — OFFICE VISIT NO LOS (OUTPATIENT)
Dept: AUDIOLOGY | Facility: CLINIC | Age: 49
End: 2024-09-17
Payer: COMMERCIAL

## 2024-09-17 DIAGNOSIS — H90.3 SENSORINEURAL HEARING LOSS (SNHL) OF BOTH EARS: Primary | ICD-10-CM

## 2024-09-17 PROCEDURE — 92557 COMPREHENSIVE HEARING TEST: CPT | Performed by: AUDIOLOGIST

## 2024-09-24 NOTE — PROGRESS NOTES
ADULT HEARING EVALUATION REPORT      HISTORY  Tamiko Tiwari was seen at [facility]UD[facility]OLOGY SERVICES on 9/17/2024, for a hearing evaluation.  She has a history of mild sloping to profound hearing loss, bilaterally. She currently wears Phonak P90 hearing aids coupled with UP C-shell ear molds to treat this loss. She reports being increasingly more frustrated with communication and understanding others. She reports it feels like it is taking longer for her to process what is being said. She reports her family feels her hearing loss is getting worse.     OTOSCOPY  Otoscopy revealed clear ear canals and visualization of tympanic membranes bilaterally.     AUDIOLOGICAL RESULTS  Audiometric results revealed a mild sensorineural  hearing loss at 250 Hz, sloping to a moderate sensorineural  hearing loss from 500 - 750Hz, a moderately-severe sensorineural  hearing loss from 1000 - 1500 Hz, a severe sensorineural hearing loss at 2000 Hz, and a from profound sensorineural hearing loss from 3000 to 8000 Hz, in the right ear. In the left ear, audiometric results revealed a mild sensorineural  hearing loss at 250 Hz, sloping to a moderate sensorineural  hearing loss from 500 - 750 Hz,  a moderately-severe sensorineural  hearing loss from 1000 - 2000 Hz, and a profound sensorineural hearing loss from 3000 to 8000 Hz.    Speech recognition thresholds (SRT) were obtained at 70 dB HL in the right ear and at 55 dB HL in the left ear. SRTs are in excellent agreement with pure tone findings, bilaterally.     Word recognition ability, when tested at a comfortable listening level, is fair in the right ear and good in the left ear.   Right: 56% at a presentation level of 90 dB HL [55 dB of masking], 76% at a presentation level of 95 dB HL [60 dB of masking] / Left: 80% at a presentation level of 90 dB HL [55 dB of masking]    IMPRESSIONS  Results reveal hearing thresholds have progressively gotten worse particularly in the right  ear. This type and degree of hearing loss can make it challenging to understand speech with clarity making communication increasingly difficult. These results suggest Ms. Tiwari remains a good candidate for amplification at this time. However, due to the word recognition scores at 80% and below good communication strategies will be a necessity in order for Tamiko to have good communication and be able to understand others. Speech in noise, speech at a distance (between rooms), and listening to someone when you cannot see their face is difficult for adults and children, even with normal hearing sensitivity. All efforts should be made to have good communication strategies where the speaker and the listener are about two arm lengths away, the face can clearly be seen, and there is no noise in the background.     We briefly discussed cochlear implants and how they work differently from hearing aids as well as the evaluation and surgical process.     RECOMMENDATIONS  Audiometric findings were reviewed with Ms. Tiwari, and the following recommendations are made:    Continued daily use of bilateral hearing aids  Hearing aids were adjusted today to meet new thresholds.  Hearing aids were verified using on ear speech measures with NAL-NL2 targets and measured RECDs.   Repeat hearing evaluation in 6 months to monitor hearing sensitivity, can also complete a CI Eval at this time to monitor where she is at in regards to CI candidacy.  Stringent use of hearing protection when exposed to excessive sound levels.

## 2024-11-04 ENCOUNTER — MEDICATION ACCESS (OUTPATIENT)
Dept: OTOLARYNGOLOGY | Facility: CLINIC | Age: 49
End: 2024-11-04
Payer: COMMERCIAL

## 2024-11-04 NOTE — PROGRESS NOTES
24 9:07 AM    PA initiated from:     [x]MAC Staff Message    This is a renewal for: Dupixent    Auth on file will : 2024    Prior authorization approved by [facility]    2025/Dupixent/Kerm[facility] renewal reminder scheduled for: 10.17.2025    Patient has not been notified, as there is no expected delay or interruption in therapy.

## 2024-12-13 ASSESSMENT — ENCOUNTER SYMPTOMS
CHEST TIGHTNESS: 0
SHORTNESS OF BREATH: 0
NAUSEA: 0
DIARRHEA: 1
VOMITING: 0
CONSTIPATION: 0
DIFFICULTY URINATING: 0
ABDOMINAL DISTENTION: 0
HOT FLASHES: 0
CHILLS: 0
FEVER: 0

## 2024-12-13 NOTE — PROGRESS NOTES
[facility]  Gynecologic Oncology  Surveillance Visit    Chief Complaint: Surveillance Visit    History of Present Illness:   Ms. Tiwari is a 49 y.o.  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 Denies GYN  or GI symptom    Pelvic US 1/10/2024 shows:The left ovary measures 2 x 1.1 x 1.3 cm.  The morphology and echogenicity is within normal limits. There are no solid or cystic adnexal masses.  Unremarkable left ovary.    Review of Systems   Review of Systems   Constitutional:  Negative for chills and fever.   Respiratory:  Negative for chest tightness and shortness of breath.    Gastrointestinal:  Positive for diarrhea. Negative for abdominal distention, constipation, nausea and vomiting.   Endocrine: Negative for hot flashes.   Genitourinary:  Negative for difficulty urinating, dyspareunia, vaginal bleeding and vaginal discharge.    All other systems reviewed and are negative.    Past Medical, Surgical and Family History were reviewed.    Current Medications:    Current Outpatient Medications:     dupilumab (Dupixent Pen) 300 mg/2 mL pen injector, Inject 1 pen (300 mg) subcutaneously every 2 weeks., Disp: 12 mL, Rfl: 3    predniSONE 10 mg tablet, Take 3 tablets daily for 2 days; then 2 tablets daily for 2 days then 1 tablet daily for 2 days then half tablet daily for 2 days then stop (Patient not taking: Reported on 2024), Disp: 13 tablet, Rfl: 0    codeine-guaiFENesin (ROBITUSSIN-AC)  mg/5 mL liquid, Take 5 mL by mouth 3 (three) times a day as needed for cough Max Daily Amount: 15 mL (Patient not taking: Reported on 2024), Disp: 120 mL, Rfl: 0    albuterol HFA 90 mcg/actuation inhaler, Inhale 2 puffs every 6 (six) hours as needed for wheezing, Disp: 18 g, Rfl: 0    fluticasone propion-salmeteroL (Wixela Inhub) 250-50 mcg/dose diskus inhaler, Inhale 1 puff 2 (two) times a day Rinse mouth with water  after use. Do not swallow. (Patient not taking: Reported on 8/14/2024), Disp: 60 each, Rfl: 0    levothyroxine (SYNTHROID, LEVOTHROID) 150 mcg tablet, TAKE 1 TABLET BY MOUTH DAILY, Disp: 90 tablet, Rfl: 2    losartan (COZAAR) 50 mg tablet, Take 1 tablet (50 mg total) by mouth daily, Disp: 90 tablet, Rfl: 2    sertraline (ZOLOFT) 100 mg tablet, Take 1 tablet (100 mg total) by mouth daily, Disp: 90 tablet, Rfl: 3    spironolactone (ALDACTONE) 50 mg tablet, Take 1 tablet (50 mg total) by mouth daily, Disp: 90 tablet, Rfl: 3    ferrous sulfate 325 mg (65 mg iron) tablet, Take 1 tablet (325 mg total) by mouth daily, Disp: , Rfl:     cholecalciferol, vitamin D3, 125 mcg (5,000 unit) tablet, Take 1 tablet (5,000 Units total) by mouth daily, Disp: , Rfl:     albuterol HFA (PROVENTIL HFA;VENTOLIN HFA) 90 mcg/actuation inhaler, Inhale 2 puffs every 2 (two) hours as needed for wheezing (every 2-3 hours as needed for cough/wheezing) (Patient not taking: Reported on 6/23/2024), Disp: 1 Inhaler, Rfl: 1     Allergies:  The patient is allergic to adhesive tape-silicones.    Social History:  Social History     Tobacco Use    Smoking status: Never    Smokeless tobacco: Never   Vaping Use    Vaping status: Never Used   Substance Use Topics    Alcohol use: Yes     Comment: occasiona- social    Drug use: No       OB/GYN History: 1 SAB, 1 C/S, no h/o abnormal pap smears    Physical Exam:  There were no vitals filed for this visit.    Weights (Current Encounter Only) (last 180 days)       None          ECOG Performance Status: 0  PHYSICAL EXAM  General: alert, oriented and in NAD  Lymph Node Surgery: no enlarged LN in neck, axilla or groin  Abdomen: Soft, NT, ND; no masses or nodularity  Ext: No CCE  Pelvic Exam:  NEFG  Vagina: without lesions  BME: Absence of uterus and adnexa; no masses or nodularity  RVE: Confirms BME      Labs:  Lab Results   Component Value Date    WBC 10.4 06/27/2024    NEUTROABS 6.22 06/27/2024    HGB 15.4  06/27/2024     06/27/2024    K 3.5 06/27/2024    CREATININE 1.00 06/27/2024    BILITOT 0.50 06/27/2024    ALKPHOS 87 06/27/2024    AST 27 06/27/2024    ALT 25 06/27/2024    CALCIUM 9.9 06/27/2024     Lab Results   Component Value Date     32.6 11/11/2021     Imaging Reports:  11/29/2021 Path Report:  A. Fallopian tube, left, salpingectomy -      - Benign fallopian tube, negative for tumor implants or malignancy.     B. 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      - Benign fallopian tube, negative for tumor implants or malignancy.      - AJCC 8th edition tumor stage: pT1a.      - Additional pathologic findings:      - Cervix with no significant pathologic alteration.      - Attenuated, inactive appearing endometrium with benign endometrial   polyps (x2, 0.7 to 1.5 cm).      - Adenomyosis.     C. Omentum, biopsy -      - Benign fibroadipose tissue, negative for tumor implants or   malignancy.     US Transvaginal 1/10/24:  IMPRESSION:     Hysterectomy     Right oophorectomy     15 mm benign left ovarian cyst    Assessment:  49 y.o. 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      Kya Norwood MD  [phone]

## 2025-01-10 ENCOUNTER — OFFICE VISIT (OUTPATIENT)
Dept: ONCOLOGY | Facility: CLINIC | Age: 50
End: 2025-01-10
Payer: COMMERCIAL

## 2025-01-10 ENCOUNTER — HOSPITAL ENCOUNTER (OUTPATIENT)
Dept: ULTRASOUND IMAGING | Facility: HOSPITAL | Age: 50
Discharge: HOME | End: 2025-01-10
Payer: COMMERCIAL

## 2025-01-10 VITALS
DIASTOLIC BLOOD PRESSURE: 93 MMHG | WEIGHT: 192 LBS | OXYGEN SATURATION: 95 % | TEMPERATURE: 97.9 F | SYSTOLIC BLOOD PRESSURE: 133 MMHG | HEART RATE: 81 BPM | BODY MASS INDEX: 30.86 KG/M2 | HEIGHT: 66 IN

## 2025-01-10 DIAGNOSIS — D39.10 BORDERLINE EPITHELIAL NEOPLASM OF OVARY: ICD-10-CM

## 2025-01-10 DIAGNOSIS — D39.10 BORDERLINE EPITHELIAL NEOPLASM OF OVARY: Primary | ICD-10-CM

## 2025-01-10 PROCEDURE — 99213 OFFICE O/P EST LOW 20 MIN: CPT | Performed by: OBSTETRICS & GYNECOLOGY

## 2025-01-10 PROCEDURE — 76830 TRANSVAGINAL US NON-OB: CPT

## 2025-01-10 ASSESSMENT — PAIN SCALES - GENERAL: PAINLEVEL_OUTOF10: 0-NO PAIN

## 2025-01-28 DIAGNOSIS — I10 ESSENTIAL HYPERTENSION: ICD-10-CM

## 2025-01-28 RX ORDER — LOSARTAN POTASSIUM 50 MG/1
50 TABLET ORAL DAILY
Qty: 90 TABLET | Refills: 0 | Status: SHIPPED | OUTPATIENT
Start: 2025-01-28

## 2025-01-28 NOTE — TELEPHONE ENCOUNTER
Unable to retrieve patient chart and identify care due.  [facility] Embedded Care Due Messages. Reference number: [id number]. 1/28/2025 11:48:35 AM MST

## 2025-03-19 ENCOUNTER — OFFICE VISIT NO LOS (OUTPATIENT)
Dept: AUDIOLOGY | Facility: CLINIC | Age: 50
End: 2025-03-19
Payer: COMMERCIAL

## 2025-03-19 DIAGNOSIS — H90.3 SENSORINEURAL HEARING LOSS (SNHL) OF BOTH EARS: Primary | ICD-10-CM

## 2025-03-19 PROCEDURE — 92550 TYMPANOMETRY & REFLEX THRESH: CPT | Performed by: AUDIOLOGIST

## 2025-03-19 PROCEDURE — 92626 EVAL AUD FUNCJ 1ST HOUR: CPT | Performed by: AUDIOLOGIST

## 2025-03-19 PROCEDURE — 92557 COMPREHENSIVE HEARING TEST: CPT | Performed by: AUDIOLOGIST

## 2025-03-19 NOTE — PROGRESS NOTES
COCHLEAR IMPLANT CANDIDACY TESTING AND CONSULTATION    Tamiko Tiwari was seen at [facility]UD[facility]OLOG[facility] for cochlear implant candidacy testing and consultation. She was accompanied to the appointment by her , Carl.      OTOSCOPY  Otoscopy revealed clear ear canals and visualization of tympanic membranes bilaterally.     COMPREHENSIVE AUDIOGRAM  Audiometric results were obtained using insert earphones, bilaterally. Results revealed a mild sensorineural  hearing loss at 250 Hz, sloping to a moderate sensorineural  hearing loss from 500 - 750Hz, a severe sensorineural hearing loss from 7254-6192 Hz, and to a profound sensorineural  hearing loss from 3000 - 8000 Hz, in the right ear. In the left ear, audiometric results revealed a mild sensorineural  hearing loss at 250 Hz, sloping to a moderate sensorineural  hearing loss from 500 - 1000 Hz, a moderately severe sensorineural hearing loss from 1301-3387 Hz, and to a profound sensorineural  hearing loss from 3000 - 8000 Hz.    Speech recognition thresholds (SRT) were obtained at 55 dB HL in the right ear and at 80 dB HL in the left ear. SRTs are in excellent agreement with pure tone findings, bilaterally.     Word recognition ability, when tested at a comfortable listening level, is poor bilaterally.   Right: 68% at a presentation level of 90 dB HL [55 dB masking] / Left: 64% at a presentation level of 90 dB HL [55 dB masking]    IMMITTANCE RESULTS  Tympanometry revealed normal tympanic membrane mobility, normal middle ear pressure, and normal ear canal volumes bilaterally.     Acoustic Reflexes   Right 500 Hz 95 dB HL     1000 Hz >100 dB HL     2000 Hz >100 dB HL    4000 Hz >100 dB HL   Left 500 Hz >100 dB HL    1000 Hz 85 dB HL    2000 Hz >100 dB HL    4000 Hz 80 dB HL     HEARING AID CHECK   Hearing aids were verified using on ear speech measures with NAL-NL2 targets and measured RECDs. Hearing aids were meeting targets for soft, average, and  loud speech from 500 - 2000 Hz. Targets were unable to be met due to the severity of the loss and the limits of the hearing device .     AIDED SPEECH PERCEPTION TESTING  Aided speech perception testing was completed at a presentation level of 60  with the patient one meter from the speaker.     AzBio - 60     Score %   Bilateral  94   Bilateral + Noise  (+10 dB SNR) 86   Right Ear (L ear Plugged) 89   Left Ear (R ear Plugged) 92     CNC - 60     Word Score % Phoneme Score %   Bilateral  80 90   Right Ear 70 86   Left Ear 78 90         COCHLEAR IMPLANT QUALITY OF LIFE - 35   Category: Raw Score / Outcome Measure / Mean    Communication: 18 / 29.01 / 51.4  Emotional: 10 / 33.30 / 64.7  Entertainment: 17 / 52.47 / 55.8  Environment: 12 / 34.60 / 61.0  Listening effort: 5 / 0.0 / 41.5  Social: 12 / 38.61 / 67.7    See attachment for details.     IMPRESSIONS  Patient does not meet candidacy criteria for cochlear implantation at this time. However, her CIQOL-35 did indicate high difficulty hearing and understanding, especially in the listening effort category. Patient should continue with hearing aids and continue to use the remote hugo. She may benefit from expanding the use of the remote hugo to social situations as well as use at work. Discussed and counseled regarding new hearing aids, specifically the Phonak Sphere 90. She will call to order when she is ready.     RECOMMENDATIONS  Audiometric findings were reviewed with Ms. Tiwari, and the following recommendations are made:    Patient is not a cochlear implant candidate at this time, continued use of hearing aids and remote hugo.

## 2025-03-20 ENCOUNTER — LAB (OUTPATIENT)
Dept: LAB | Facility: CLINIC | Age: 50
End: 2025-03-20
Payer: COMMERCIAL

## 2025-03-20 ENCOUNTER — RESULTS FOLLOW-UP (OUTPATIENT)
Dept: FAMILY MEDICINE | Facility: CLINIC | Age: 50
End: 2025-03-20

## 2025-03-20 DIAGNOSIS — Z00.00 WELLNESS EXAMINATION: ICD-10-CM

## 2025-03-20 DIAGNOSIS — E03.9 ACQUIRED HYPOTHYROIDISM: Chronic | ICD-10-CM

## 2025-03-20 LAB
ANION GAP SERPL CALC-SCNC: 7 MMOL/L (ref 3–11)
BUN SERPL-MCNC: 19 MG/DL (ref 7–25)
CALCIUM SERPL-MCNC: 9.1 MG/DL (ref 8.6–10.3)
CHLORIDE SERPL-SCNC: 103 MMOL/L (ref 98–107)
CHOLEST SERPL-MCNC: 222 MG/DL (ref 0–199)
CO2 SERPL-SCNC: 28 MMOL/L (ref 21–32)
CREAT SERPL-MCNC: 0.78 MG/DL (ref 0.6–1.1)
EGFRCR SERPLBLD CKD-EPI 2021: 92 ML/MIN/1.73M*2
FASTING STATUS PATIENT QL REPORTED: YES
GLUCOSE SERPL-MCNC: 92 MG/DL (ref 70–105)
HDLC SERPL-MCNC: 58 MG/DL
LDLC SERPL CALC-MCNC: 136 MG/DL (ref 20–99)
POTASSIUM SERPL-SCNC: 4.4 MMOL/L (ref 3.5–5.1)
SODIUM SERPL-SCNC: 138 MMOL/L (ref 135–145)
TRIGL SERPL-MCNC: 140 MG/DL
TSH SERPL DL<=0.05 MIU/L-ACNC: 2.19 UIU/ML (ref 0.34–4.82)

## 2025-03-20 PROCEDURE — 84443 ASSAY THYROID STIM HORMONE: CPT | Performed by: FAMILY MEDICINE

## 2025-03-20 PROCEDURE — 80061 LIPID PANEL: CPT | Performed by: FAMILY MEDICINE

## 2025-03-20 PROCEDURE — 36415 COLL VENOUS BLD VENIPUNCTURE: CPT | Performed by: FAMILY MEDICINE

## 2025-03-20 PROCEDURE — 80048 BASIC METABOLIC PNL TOTAL CA: CPT | Performed by: FAMILY MEDICINE

## 2025-03-24 ENCOUNTER — OFFICE VISIT (OUTPATIENT)
Dept: FAMILY MEDICINE | Facility: CLINIC | Age: 50
End: 2025-03-24
Payer: COMMERCIAL

## 2025-03-24 VITALS
HEART RATE: 100 BPM | TEMPERATURE: 97.5 F | HEIGHT: 66 IN | WEIGHT: 195 LBS | OXYGEN SATURATION: 95 % | SYSTOLIC BLOOD PRESSURE: 116 MMHG | DIASTOLIC BLOOD PRESSURE: 78 MMHG | BODY MASS INDEX: 31.34 KG/M2

## 2025-03-24 DIAGNOSIS — E03.9 ACQUIRED HYPOTHYROIDISM: Chronic | ICD-10-CM

## 2025-03-24 DIAGNOSIS — I10 ESSENTIAL HYPERTENSION: ICD-10-CM

## 2025-03-24 DIAGNOSIS — Z00.00 WELLNESS EXAMINATION: Primary | ICD-10-CM

## 2025-03-24 DIAGNOSIS — G47.00 INSOMNIA, UNSPECIFIED TYPE: ICD-10-CM

## 2025-03-24 PROBLEM — E61.1 IRON DEFICIENCY: Status: RESOLVED | Noted: 2020-09-25 | Resolved: 2025-03-24

## 2025-03-24 PROBLEM — D39.10 BORDERLINE EPITHELIAL NEOPLASM OF OVARY: Chronic | Status: ACTIVE | Noted: 2022-01-04

## 2025-03-24 PROBLEM — J32.4 CHRONIC PANSINUSITIS: Status: RESOLVED | Noted: 2021-10-25 | Resolved: 2025-03-24

## 2025-03-24 PROBLEM — N92.1 MENOMETRORRHAGIA: Status: RESOLVED | Noted: 2020-09-25 | Resolved: 2025-03-24

## 2025-03-24 PROBLEM — E53.8 B12 DEFICIENCY: Chronic | Status: ACTIVE | Noted: 2022-12-27

## 2025-03-24 PROBLEM — J34.3 HYPERTROPHY OF NASAL TURBINATES: Status: RESOLVED | Noted: 2021-10-25 | Resolved: 2025-03-24

## 2025-03-24 PROCEDURE — 99396 PREV VISIT EST AGE 40-64: CPT | Performed by: FAMILY MEDICINE

## 2025-03-24 RX ORDER — SERTRALINE HYDROCHLORIDE 100 MG/1
100 TABLET, FILM COATED ORAL DAILY
Qty: 90 TABLET | Refills: 3 | Status: SHIPPED | OUTPATIENT
Start: 2025-03-24

## 2025-03-24 RX ORDER — LOSARTAN POTASSIUM 50 MG/1
50 TABLET ORAL DAILY
Qty: 90 TABLET | Refills: 3 | Status: SHIPPED | OUTPATIENT
Start: 2025-03-24

## 2025-03-24 RX ORDER — SPIRONOLACTONE 50 MG/1
50 TABLET, FILM COATED ORAL DAILY
Qty: 90 TABLET | Refills: 3 | Status: SHIPPED | OUTPATIENT
Start: 2025-03-24

## 2025-03-24 RX ORDER — LEVOTHYROXINE SODIUM 150 UG/1
150 TABLET ORAL DAILY
Qty: 90 TABLET | Refills: 3 | Status: SHIPPED | OUTPATIENT
Start: 2025-03-24

## 2025-03-24 NOTE — PATIENT INSTRUCTIONS
Female Wellness    Adopting a healthy lifestyle and getting preventive care can go a long way to promote health and wellness. Talk with your health care provider about what schedule of regular examinations is right for you. This is a good chance for you to check in with your provider about disease prevention and staying healthy.    In between checkups, there are plenty of things you can do on your own. Experts have done a lot of research about which lifestyle changes and preventive measures are most likely to keep you healthy. Ask your health care provider for more information.    Weight and diet  Eat a healthy diet  Be sure to include plenty of vegetables, fruits, low-fat dairy products, and lean protein.  Do not eat a lot of foods high in solid fats, added sugars, or salt.  Get regular exercise. This is one of the most important things you can do for your health.  Most adults should exercise for at least 150 minutes each week. The exercise should increase your heart rate and make you sweat (moderate-intensity exercise).  Most adults should also do strengthening exercises at least twice a week. This is in addition to the moderate-intensity exercise.    Maintain a healthy weight  Body mass index (BMI) is a measurement that can be used to identify possible weight problems. It estimates body fat based on height and weight. Your health care provider can help determine your BMI and help you achieve or maintain a healthy weight.  For females 20 years of age and older:  A BMI below 18.5 is considered underweight.  A BMI of 18.5 to 24.9 is normal.  A BMI of 25 to 29.9 is considered overweight.  A BMI of 30 and above is considered obese.    Your BMI today was:  Body mass index is 31.47 kg/m².     Watch levels of cholesterol and blood lipids  You should start having your blood tested for lipids and cholesterol at 20 years of age, then have this test every 5 years.  You may need to have your cholesterol levels checked more  often if:  Your lipid or cholesterol levels are high.  You are older than 50 years of age.  You are at high risk for heart disease.    Cancer screening  Lung Cancer  Lung cancer screening is recommended for adults 55-80 years old who are at high risk for lung cancer because of a history of smoking.  A yearly low-dose CT scan of the lungs is recommended for people who:  Currently smoke.  Have quit within the past 15 years.  Have at least a 30-pack-year history of smoking. A pack year is smoking an average of one pack of cigarettes a day for 1 year.  Yearly screening should continue until it has been 15 years since you quit.  Yearly screening should stop if you develop a health problem that would prevent you from having lung cancer treatment.    Breast Cancer  Practice breast self-awareness. This means understanding how your breasts normally appear and feel.  It also means doing regular breast self-exams. Let your health care provider know about any changes, no matter how small.  If you are in your 20s or 30s, you should have a clinical breast exam (CBE) by a health care provider every 1-3 years as part of a regular health exam.  If you are 40 or older, have a CBE every year. Also consider having a breast X-ray (mammogram) every year.  If you have a family history of breast cancer, talk to your health care provider about genetic screening.  If you are at high risk for breast cancer, talk to your health care provider about having an MRI and a mammogram every year.  Breast cancer gene (BRCA) assessment is recommended for women who have family members with BRCA-related cancers. BRCA-related cancers include:  Breast.  Ovarian.  Tubal.  Peritoneal cancers.  Results of the assessment will determine the need for genetic counseling and BRCA1 and BRCA2 testing.    Cervical Cancer  Your health care provider may recommend that you be screened regularly for cancer of the pelvic organs (ovaries, uterus, and vagina). This screening  involves a pelvic examination, including checking for microscopic changes to the surface of your cervix (Pap test). You may be encouraged to have this screening done every 3 years, beginning at age 21.  For women ages 30-65, health care providers may recommend pelvic exams and Pap testing every 3 years, or they may recommend the Pap and pelvic exam, combined with testing for human papilloma virus (HPV), every 5 years. Some types of HPV increase your risk of cervical cancer. Testing for HPV may also be done on women of any age with unclear Pap test results.  Other health care providers may not recommend any screening for nonpregnant women who are considered low risk for pelvic cancer and who do not have symptoms. Ask your health care provider if a screening pelvic exam is right for you.    Colorectal Cancer  This type of cancer can be detected and often prevented.  Routine colorectal cancer screening usually begins at 45 years of age and continues through 75 years of age.  Your health care provider may recommend screening at an earlier age if you have risk factors for colon cancer.  Your health care provider may also recommend using home test kits to check for hidden blood in the stool if you are considered normal or low risk.  These tests are called FIT or Cologuard tests.  A small camera at the end of a tube can be used to examine your colon directly (colonoscopy). This is done to check for the earliest forms of colorectal cancer.  Routine screening usually begins at age 45.  Direct examination of the colon should be repeated every 5-10 years through 75 years of age. However, you may need to be screened more often if early forms of precancerous polyps or small growths are found.    Skin Cancer  Check your skin from head to toe regularly.  Tell your health care provider about any new moles or changes in moles, especially if there is a change in a mole's shape or color.  Also tell your health care provider if you  have a mole that is larger than the size of a pencil eraser.  Always use sunscreen. Apply sunscreen liberally and repeatedly throughout the day.  Protect yourself by wearing long sleeves, pants, a wide-brimmed hat, and sunglasses whenever you are outside.    Heart disease, diabetes, and high blood pressure  High blood pressure causes heart disease and increases the risk of stroke. High blood pressure is more likely to develop in:  People who have blood pressure in the high end of the normal range (130-139/85-89 mm Hg).  People who are overweight or obese.  People who are .  If you are 18-39 years of age, have your blood pressure checked every 3-5 years. If you are 40 years of age or older, have your blood pressure checked every year. You should have your blood pressure measured twice--once when you are at a hospital or clinic, and once when you are not at a hospital or clinic. Record the average of the two measurements. To check your blood pressure when you are not at a hospital or clinic, you can use:  An automated blood pressure machine at a pharmacy.  A home blood pressure monitor.  Have regular diabetes screenings. This involves taking a blood sample to check your fasting blood sugar level.  If you are at a normal weight and have a low risk for diabetes, have this test once every three years after 45 years of age.  If you are overweight and have a high risk for diabetes, consider being tested at a younger age or more often.    Hepatitis C  Blood testing is recommended for:  Everyone born from 1945 through 1965.  Anyone with known risk factors for hepatitis C.    Sexually transmitted infections (STIs)  You should be screened for sexually transmitted infections (STIs) including gonorrhea and chlamydia if:  You are sexually active and are younger than 24 years of age.  You are older than 24 years of age and your health care provider tells you that you are at risk for this type of infection.  Your  sexual activity has changed since you were last screened and you are at an increased risk for chlamydia or gonorrhea. Ask your health care provider if you are at risk.  Pregnancy  If you are premenopausal and you may become pregnant, ask your health care provider about preconception counseling.  If you may become pregnant, take 400 to 800 micrograms (mcg) of folic acid every day.  If you want to prevent pregnancy, talk to your health care provider about birth control (contraception).    Osteoporosis and menopause  Osteoporosis is a disease in which the bones lose minerals and strength with aging. This can result in serious bone fractures. Your risk for osteoporosis can be identified using a bone density scan.  If you are 65 years of age or older, or if you are at risk for osteoporosis and fractures, ask your health care provider if you should be screened.  Ask your health care provider whether you should take a calcium or vitamin D supplement to lower your risk for osteoporosis.      Follow these instructions at home:  Schedule regular health, dental, and eye exams.  Stay current with your immunizations.  Do not use any tobacco products including cigarettes, chewing tobacco, electronic cigarettes or drugs.  If you are pregnant, do not drink alcohol.  If you are breastfeeding, limit how much and how often you drink alcohol.  Limit alcohol intake to no more than 1 drink per day for nonpregnant women. One drink equals 12 ounces of beer, 5 ounces of wine, or 1½ ounces of hard liquor.  Tell your health care provider if you often feel depressed.  Tell your health care provider if you have ever been abused or do not feel safe at home.        [employer] Interactive Patient Education © 2018 [employer]

## 2025-03-24 NOTE — PROGRESS NOTES
SUBJECTIVE:    Chief Complaint   Patient presents with    Physical         Tamiko Tiwari is a 50 y.o. female presenting for an annual exam.    She has hypothyroidism which has been stable on current replacement.  Her hypertension has also been stable on current therapy.  She has no new concerns at today's visit.    Patient Active Problem List   Diagnosis    Acquired hypothyroidism    Essential hypertension    Vitamin D deficiency    Sensorineural hearing loss (SNHL) of both ears    Severe persistent asthma with acute exacerbation    Borderline epithelial neoplasm of ovary    B12 deficiency       Outpatient Medications Prior to Visit   Medication Sig Dispense Refill    dupilumab (Dupixent Pen) 300 mg/2 mL pen injector Inject 1 pen (300 mg) subcutaneously every 2 weeks. 12 mL 3    albuterol HFA 90 mcg/actuation inhaler Inhale 2 puffs every 6 (six) hours as needed for wheezing 18 g 0    cholecalciferol, vitamin D3, 125 mcg (5,000 unit) tablet Take 1 tablet (5,000 Units total) by mouth daily      albuterol HFA (PROVENTIL HFA;VENTOLIN HFA) 90 mcg/actuation inhaler Inhale 2 puffs every 2 (two) hours as needed for wheezing (every 2-3 hours as needed for cough/wheezing) 1 Inhaler 1    losartan (COZAAR) 50 mg tablet TAKE 1 TABLET (50 MG TOTAL) BY MOUTH DAILY 90 tablet 0    levothyroxine (SYNTHROID, LEVOTHROID) 150 mcg tablet TAKE 1 TABLET BY MOUTH DAILY 90 tablet 2    sertraline (ZOLOFT) 100 mg tablet Take 1 tablet (100 mg total) by mouth daily 90 tablet 3    spironolactone (ALDACTONE) 50 mg tablet Take 1 tablet (50 mg total) by mouth daily 90 tablet 3    predniSONE 10 mg tablet Take 3 tablets daily for 2 days; then 2 tablets daily for 2 days then 1 tablet daily for 2 days then half tablet daily for 2 days then stop (Patient not taking: Reported on 8/13/2024) 13 tablet 0    codeine-guaiFENesin (ROBITUSSIN-AC)  mg/5 mL liquid Take 5 mL by mouth 3 (three) times a day as needed for cough Max Daily Amount: 15 mL  (Patient not taking: Reported on 3/24/2025) 120 mL 0    fluticasone propion-salmeteroL (Wixela Inhub) 250-50 mcg/dose diskus inhaler Inhale 1 puff 2 (two) times a day Rinse mouth with water after use. Do not swallow. (Patient not taking: Reported on 2024) 60 each 0    ferrous sulfate 325 mg (65 mg iron) tablet Take 1 tablet (325 mg total) by mouth daily (Patient not taking: Reported on 3/24/2025)       No facility-administered medications prior to visit.       Family Status   Relation Name Status    Mother Izabela Alive, age 79y    Father Rudy Alive, age 80y    Sister Macy Alive, age 57y    Sister Lucia Alive, age 53y    Brother Vinnie Alive, age 55y    Maternal ARLETH Yuan  at age 60s    Maternal DARLENE Chester  at age 80s    Paternal ARLETH Thomas Alive, age 100y    Paternal DARLENE Grant  at age 90s    Son  Alive    Mat Aunt Pat (Not Specified)    Mat Uncle  (Not Specified)    Mat Uncle Shaggy (Not Specified)    Pat Uncle  (Not Specified)    Neg Hx  (Not Specified)   No partnership data on file       Family History   Problem Relation Age of Onset    Hypertension Mother     Prostate cancer Father     Other Sister         Hysterectomy due to benign tumors     Tachycardia Sister     Hypertension Brother     Heart failure Maternal Grandmother     Alcohol abuse Maternal Grandfather     Pacemaker/AICD Paternal Grandmother     Alcohol abuse Paternal Grandfather     Asthma Son     Eczema Son     Cancer Mother's Sister         unsure of what type    Other Mother's Brother     Stroke Mother's Brother     Multiple sclerosis Father's Brother     Pancreatic cancer Father's Brother     Ovarian cancer Neg Hx     Melanoma Neg Hx     Breast cancer Neg Hx     Uterine cancer Neg Hx          The patient's past medical history, medications, allergies, family history and social history were reviewed in her electronic medical record at today's visit.      REVIEW OF SYSTEMS:  Constitutional: Weight up 16 pounds  HEENT:  No change  "in vision; wears bilateral hearing aides- had eval for cochlear implants but does not qualify  Pulmonary: No dyspnea on exertion, no wheezing, no shortness of breath  Cardiovascular: No exercise intolerance, no chest pain, no diaphoresis, no edema  Gastrointestinal: some bloating, gas and loose stools (chronically)- advised low fodmap diet  :  The patient denies frequent UTI, nocturia, frequency or incomplete voiding.  No dyspareunia. Menses:  absent, no spotting  Skin/Integumentary: No abnormal skin lesions noted.  Neurologic: No chronic headaches.  Psychiatric: Denies depression or anxiety- stable on current therapy.  Some fatigue.  Musculoskeletal:  no joint pain or swelling.      OBJECTIVE:   The patient appears well, in NAD. Mood and affect appropriate.  /78 (BP Location: Left arm, Patient Position: Sitting, Cuff Size: Regular Adult)   Pulse 100   Temp 36.4 °C (97.5 °F) (Temporal)   Ht 1.676 m (5' 6\")   Wt 88.5 kg (195 lb)   LMP 10/23/2021   SpO2 95%   BMI 31.47 kg/m²    HEENT: Eyes without gross abnormality; Ears normal; TM's are clear. Throat and pharynx normal. Teeth in good repair.  Neck supple. No adenopathy or thyromegaly.  Lungs: Clear, good air entry, no wheezes, rhonchi or rales.   Heart: S1 and S2 normal, no murmurs, regular rate and rhythm. Carotids are without bruits bilaterally.  Breasts:  Symmetrical, without mass,nipple discharge or axillary adenopathy.  Abdomen: Soft without tenderness, guarding, mass or hepatosplenomegaly.  Extremities: No edema, no varicosities. Distal pulses intact.  Skin: I note only benign skin findings. No unusual rashes or suspicious skin lesions noted. Nails appear normal.      Labs:    Recent Results (from the past 4 weeks)   Basic metabolic panel Blood, Venous    Collection Time: 03/20/25  7:34 AM   Result Value Ref Range    Sodium 138 135 - 145 MMOL/L    Potassium 4.4 3.5 - 5.1 MMOL/L    Chloride 103 98 - 107 MMOL/L    CO2 28 21 - 32 MMOL/L    BUN 19 7 " - 25 mg/dL    Creatinine 0.78 0.60 - 1.10 mg/dL    Glucose 92 70 - 105 MG/DL    Calcium 9.1 8.6 - 10.3 MG/DL    Anion Gap 7 3 - 11 mmol/L    eGFR 92 >60 mL/min/1.73m*2   Lipid panel Blood, Venous    Collection Time: 03/20/25  7:34 AM   Result Value Ref Range    Triglycerides 140 <=149 mg/dL    Cholesterol 222 (H) 0 - 199 mg/dL    HDL 58 >=40 mg/dL    LDL Calculated 136 (H) 20 - 99 mg/dL    Fasting? Yes    Thyroid Stimulating Hormone, Ultrasensitive Blood, Venous    Collection Time: 03/20/25  7:34 AM   Result Value Ref Range    TSH 2.188 0.340 - 4.820 uIU/ml       The 10-year ASCVD risk score (Calin LOVELACE, et al., 2019) is: 1.2%    Values used to calculate the score:      Age: 50 years      Sex: Female      Is Non- : No      Diabetic: No      Tobacco smoker: No      Systolic Blood Pressure: 116 mmHg      Is BP treated: No      HDL Cholesterol: 58 mg/dL      Total Cholesterol: 222 mg/dL      ASSESSMENT:    Diagnosis Plan   1. Wellness examination  Lipid panel Blood, Venous    Basic metabolic panel Blood, Venous      2. Essential hypertension  losartan (COZAAR) 50 mg tablet    spironolactone (ALDACTONE) 50 mg tablet      3. Insomnia, unspecified type  sertraline (ZOLOFT) 100 mg tablet      4. Acquired hypothyroidism  levothyroxine (SYNTHROID, LEVOTHROID) 150 mcg tablet    Thyroid Stimulating Hormone, Ultrasensitive Blood, Venous           PLAN:   The patient was provided written information regarding healthcare maintenance in today's AVS.  This includes diet, exercise, vaccinations and age appropriate cancer screenings.      Health Maintenance   Vaccines:  reviewed and due for shingrix- will return.  Diet/Exercise:  Body mass index is 31.47 kg/m²..  The patient was provided written materials regarding healthy BMI and exercise recommendations.  Colon Cancer Screening:  due 5/2028.  Breast Cancer Screening:  scheduled.  Cervical Cancer Screening up to date:  N/A.  Cholesterol Screening:  results  reviewed  Diabetes Screening:  results reviewed  Lung Cancer Screening:  not applicable  Smoking Cessation:  N/A  Hepatitis C Screening:   N/A  Depression Screening:  see ROS    Return in about 1 year (around 3/24/2026) for 30 MIN- PHYSICAL, LAB appointment prior to next visit- orders in.    Jacinda Duran MD

## 2025-04-07 ENCOUNTER — RESULTS FOLLOW-UP (OUTPATIENT)
Dept: FAMILY MEDICINE | Facility: CLINIC | Age: 50
End: 2025-04-07

## 2025-05-20 NOTE — PROGRESS NOTES
Subjective   Return Dermatology Skin examination  Tamiko Tiwari is a 50 y.o. female with a history of Benign nevus of plantar aspect of foot who presents for a full body skin exam . Lesions of concern include: Rash in bellybutton that started last night patient used triple antibiotic.      Review of Systems   Constitutional:  Negative for fatigue and fever.   Skin:  Negative for rash.   Allergic/Immunologic: Positive for environmental allergies. Negative for immunocompromised state.   Hematological:  Does not bruise/bleed easily.   All other systems reviewed and are negative.      Past Medical History:   Diagnosis Date    Allergic Pollen - 2019    B12 deficiency 12/27/2022    Chronic pansinusitis 10/25/2021    Added automatically from request for surgery [id number]      Complex ovarian cyst     Depression     Endocrine disorder     Hypertension     Hypertrophy of nasal turbinates 10/25/2021    Added automatically from request for surgery [id number]      Hypothyroid     Iron deficiency 09/25/2020    Psychiatric illness     depression    Respiratory disease     Severe persistent asthma with acute exacerbation 08/07/2020    Eosinophilic asthma; [place]       Current Outpatient Medications on File Prior to Visit   Medication Sig Dispense Refill    losartan (COZAAR) 50 mg tablet Take 1 tablet (50 mg total) by mouth daily 90 tablet 3    sertraline (ZOLOFT) 100 mg tablet Take 1 tablet (100 mg total) by mouth daily 90 tablet 3    spironolactone (ALDACTONE) 50 mg tablet Take 1 tablet (50 mg total) by mouth daily 90 tablet 3    levothyroxine (SYNTHROID, LEVOTHROID) 150 mcg tablet Take 1 tablet (150 mcg total) by mouth daily 90 tablet 3    dupilumab (Dupixent Pen) 300 mg/2 mL pen injector Inject 1 pen (300 mg) subcutaneously every 2 weeks. 12 mL 3    albuterol HFA 90 mcg/actuation inhaler Inhale 2 puffs every 6 (six) hours as needed for wheezing 18 g 0    cholecalciferol, vitamin D3, 125 mcg (5,000 unit) tablet Take 1 tablet (5,000  Units total) by mouth daily      albuterol HFA (PROVENTIL HFA;VENTOLIN HFA) 90 mcg/actuation inhaler Inhale 2 puffs every 2 (two) hours as needed for wheezing (every 2-3 hours as needed for cough/wheezing) 1 Inhaler 1     No current facility-administered medications on file prior to visit.       Allergies  Adhesive tape-silicones    The following have been reviewed and updated as appropriate in this visit          Physical Examination    Vital Signs  vitals were not taken for this visit.         Physical Exam Findings:     Constitutional: General Appearance: healthy-appearing, well-nourished, and well-developed. Level of Distress: NAD.           A skin examination was performed of the following sites:    Trunk (including back, chest, abdomen): Stuck on tan brown papules, well demarcated tan brown macules, bright red cherry papuleswell demarcated brown macules and papules with similar pigmentation and borders. Erythematous in umbilicus with maceration of skin present today in belly button.  Arms/Hands: well demarcated brown macules and papules with similar pigmentation and borders.  Buttocks: No suspicious lesions noted   Groin:No suspicious lesions noted   Legs:well demarcated tan brown macules, well demarcated brown macules and papules with similar pigmentation and borders.  Feet: Toenails are polished on exam today.  Scalp: No suspicious lesions noted   Face: well demarcated tan brown macules,stuck on papules      Lesions to follow:  Left plantar foot 1.1 x 0.5 cm medium brown kite shaped papule with even globular pigment   Unchanged    Diagnoses and all orders for this visit:    Infection of skin due to Streptococcus species  -     mupirocin (BACTROBAN) 2 % ointment; Apply to belly button 2x daily for 14 days  - suspect cutaneous infection of skin of umbilicous. Recommend application of mupirocin ointment twice daily for two weeks. Patient to call if this fails to resolve.     Seborrheic keratosis  Diagnosis  discussed. Discussed that these are skin thickenings that occur over time with age. Patient may obtain more over time. Reassured patient that lesions are benign and no treatment is necessary unless bothersome.      Lentigo  Discussed benign nature of lentigos, informed patient they are sun-induced brown flat lesions. Recommend diligent sun protection.      Lee hemangioma   Discussed benign nature of cherry angiomas in detail inform patient these are genetic and hormonally induced vascular growths with no potential for malignant transformation     Multiple benign nevi  Reassurance given for benign appearing nevi today. Patient is to watch for any changes that would suggest atypia such as itching, bleeding, changing irregular shape, increased diameter or irregularity, and multiple colors in moles and contact our office if any such changes occur.      History Benign nevus of plantar aspect of foot  - Reassurance given for benign appearing nevi. Patient is to watch for any changes that would suggest atypia such as itching, bleeding, changing irregular shape, increased diameter or irregularity, and multiple colors in moles and contact our office if any such changes occur.                   I emphasized daily sunscreen use with an SPF of 30-50, broad spectrum and water resistant.  I directed reapplication every two hours.  I recommended wide brimmed hats.  Finally, we discussed danger signs of changing skin lesions including sores not healing and moles changing in size, shape or color.  Should any of these lesions occur before next appointment, I instructed patent to call sooner for evaluation.    Patient expresses understanding and agreement with the plan of care, and had no further questions at the end of the exam today.     Return in about 1 year (around 6/6/2026) for skin check.

## 2025-06-06 ENCOUNTER — OFFICE VISIT (OUTPATIENT)
Dept: DERMATOLOGY | Facility: CLINIC | Age: 50
End: 2025-06-06
Payer: COMMERCIAL

## 2025-06-06 DIAGNOSIS — L08.9: Primary | ICD-10-CM

## 2025-06-06 DIAGNOSIS — L81.4 LENTIGO: ICD-10-CM

## 2025-06-06 DIAGNOSIS — D18.01 CHERRY HEMANGIOMA: ICD-10-CM

## 2025-06-06 DIAGNOSIS — L82.1 SEBORRHEIC KERATOSIS: ICD-10-CM

## 2025-06-06 DIAGNOSIS — D22.70: ICD-10-CM

## 2025-06-06 DIAGNOSIS — D22.9 MULTIPLE BENIGN NEVI: ICD-10-CM

## 2025-06-06 RX ORDER — MUPIROCIN 20 MG/G
OINTMENT TOPICAL
Qty: 30 G | Refills: 0 | Status: SHIPPED | OUTPATIENT
Start: 2025-06-06

## 2025-06-06 ASSESSMENT — ENCOUNTER SYMPTOMS
BRUISES/BLEEDS EASILY: 0
FATIGUE: 0
FEVER: 0

## 2025-06-06 ASSESSMENT — PAIN SCALES - GENERAL: PAINLEVEL_OUTOF10: 0-NO PAIN

## 2025-06-17 PROBLEM — J33.9 NASAL POLYPS: Status: ACTIVE | Noted: 2025-06-17

## 2025-06-17 PROBLEM — J45.50 SEVERE PERSISTENT ASTHMA, UNCOMPLICATED: Status: ACTIVE | Noted: 2025-06-17

## 2025-07-21 ENCOUNTER — OFFICE VISIT NO LOS (OUTPATIENT)
Dept: AUDIOLOGY | Facility: CLINIC | Age: 50
End: 2025-07-21
Payer: COMMERCIAL

## 2025-07-21 DIAGNOSIS — Z71.89 ENCOUNTER FOR HEARING AID CONSULTATION: Primary | ICD-10-CM

## 2025-07-21 PROCEDURE — V5010 ASSESSMENT FOR HEARING AID: HCPCS | Mod: NC | Performed by: AUDIOLOGIST

## 2025-07-21 NOTE — PROGRESS NOTES
HEARING AID CONSULTAITON    Tamiko Tiwari was seen on 7/21/2025 for a hearing aid consultation at [facility].      Discussed and counseled regarding Ms. Tiwari’s hearing aid options, available and appropriate levels of technology, styles, pricing, warranties/trial period, and realistic expectations.      Quoted 4825.00 for a new set of Phonak Sphere 90 hearing aids in chestnut color. Will order ear molds in  level P. SN: [id number] (R) / [id number] (L). Ms. Tiwari has decided to order the above mentioned hearing aids.  When the hearing aids come in we will call to schedule a hearing aid fitting.      Hearing aids were cleaned while she was in today. [place] machine was used and removed 2.2 uL of moisture from the aids.       No Charge for hearing aid consultation today.

## 2025-07-25 DIAGNOSIS — J45.50 SEVERE PERSISTENT ASTHMA WITHOUT COMPLICATION: ICD-10-CM

## 2025-07-25 DIAGNOSIS — J32.4 CHRONIC PANSINUSITIS: ICD-10-CM

## 2025-07-25 DIAGNOSIS — J33.9 NASAL POLYP: ICD-10-CM

## 2025-07-28 RX ORDER — DUPILUMAB 300 MG/2ML
300 INJECTION, SOLUTION SUBCUTANEOUS EVERY 2 WEEKS
Qty: 12 ML | Refills: 3 | Status: SHIPPED | OUTPATIENT
Start: 2025-07-28 | End: 2026-07-28

## 2025-08-14 ENCOUNTER — OFFICE VISIT (OUTPATIENT)
Dept: OTOLARYNGOLOGY | Facility: CLINIC | Age: 50
End: 2025-08-14
Payer: COMMERCIAL

## 2025-08-14 VITALS — HEART RATE: 78 BPM | OXYGEN SATURATION: 94 % | TEMPERATURE: 97.3 F | RESPIRATION RATE: 17 BRPM

## 2025-08-14 DIAGNOSIS — J45.50 SEVERE PERSISTENT ASTHMA WITHOUT COMPLICATION: ICD-10-CM

## 2025-08-14 DIAGNOSIS — J33.9 NASAL POLYP: ICD-10-CM

## 2025-08-14 DIAGNOSIS — J32.4 CHRONIC PANSINUSITIS: Primary | ICD-10-CM

## 2025-08-14 PROCEDURE — 31231 NASAL ENDOSCOPY DX: CPT | Performed by: OTOLARYNGOLOGY

## 2025-08-14 PROCEDURE — 99213 OFFICE O/P EST LOW 20 MIN: CPT | Mod: 25 | Performed by: OTOLARYNGOLOGY

## 2025-08-14 RX ORDER — DUPILUMAB 300 MG/2ML
300 INJECTION, SOLUTION SUBCUTANEOUS EVERY 2 WEEKS
Qty: 12 ML | Refills: 3 | OUTPATIENT
Start: 2025-08-14 | End: 2026-08-14

## 2025-08-14 ASSESSMENT — ENCOUNTER SYMPTOMS
FEVER: 0
RHINORRHEA: 0
SINUS PRESSURE: 0
SORE THROAT: 0
TROUBLE SWALLOWING: 0
CHILLS: 0
CONSTITUTIONAL NEGATIVE: 1

## 2025-08-14 ASSESSMENT — PAIN SCALES - GENERAL: PAINLEVEL_OUTOF10: 0-NO PAIN

## 2025-08-20 ENCOUNTER — OFFICE VISIT NO LOS (OUTPATIENT)
Dept: AUDIOLOGY | Facility: CLINIC | Age: 50
End: 2025-08-20
Payer: COMMERCIAL

## 2025-08-20 DIAGNOSIS — Z46.1 FITTING AND ADJUSTMENT OF HEARING AID: Primary | ICD-10-CM

## 2025-08-20 PROCEDURE — V5261 HEARING AID, DIGIT, BIN, BTE: HCPCS | Performed by: AUDIOLOGIST

## 2025-08-20 PROCEDURE — V5011 HEARING AID FITTING/CHECKING: HCPCS | Performed by: AUDIOLOGIST

## (undated) DEVICE — GLOVE SENSICARE SLT 7.5 SURG STL POWDER FREE

## (undated) DEVICE — GOWN SURGICAL XL LEVEL 4

## (undated) DEVICE — DRESSING TEGADERM 2 3/8X2 3/4

## (undated) DEVICE — SUTURE VICRYL 0 CT-1 36" VIOLET

## (undated) DEVICE — IRRIGATOR SUCTION WITHOUT DISP TIP

## (undated) DEVICE — CONTAINER SPECIMEN 4OZ STL SCREW TOP BLISTER PACK

## (undated) DEVICE — SUTURE VICRYL 0 UR6 27" VIOLET

## (undated) DEVICE — PENCIL EVACUATION SLIM SMOKE PLUMEPEN PRO

## (undated) DEVICE — TUBE IRRIGATION LINE

## (undated) DEVICE — ADHESIVE MASTISOL LIQUID

## (undated) DEVICE — PREP SKIN CHLORAPREP ORNG 26ML STL

## (undated) DEVICE — PACK BASIC

## (undated) DEVICE — NEEDLE BLUNT FILL 18G 1.5

## (undated) DEVICE — DRESSING NASAL POST-OP DISSOLVABLE RAPID RHINO

## (undated) DEVICE — OCCLUDER COLPO PNEUMO CPS

## (undated) DEVICE — LUBRICANT 3GM PACKET

## (undated) DEVICE — GLOVE SURG SZ 8 GREEN W/ ALOE VERA

## (undated) DEVICE — MARKER SKIN DUAL TIP STL PENSCRIBE W/RULER/9 LABELS  1 PEN 2 TIPS

## (undated) DEVICE — TROCAR BLADELESS 5MM VERSAPORT OPTICAL WITH FIXATION CANNULA

## (undated) DEVICE — SOL SOD CHL IRR .9% 1000ML BTL USP PLASTIC POUR BOTTLE

## (undated) DEVICE — SPONGE GAUZE 12PLY 4X4 STL

## (undated) DEVICE — DRAPE SPLIT WITHADHESIVE 36INX29

## (undated) DEVICE — GLOVE BIOGEL PI IND SURGICAL SZ 7.5

## (undated) DEVICE — SUTURE VLOC180 0 GS-21 12

## (undated) DEVICE — KIT ANTI-FOG

## (undated) DEVICE — SYRINGE 3CC LUER LOCK TIP MONOJECT

## (undated) DEVICE — SUTURE VICRYL 4-0 PC-3 CTD UNDYED 18" 45CM

## (undated) DEVICE — SOL SOD CHL INJ .9% 1000ML BAG USP VIAFLEX PLASTIC CONTAINER

## (undated) DEVICE — Device

## (undated) DEVICE — SOL LAC RING INJ 1000ML BAG USP VIAFLEX PLASTIC CONTAINER

## (undated) DEVICE — SHEAR LAPAROSCOPIC COAGULATING

## (undated) DEVICE — GLOVE SENSICARE 6.5 SURG

## (undated) DEVICE — GLOVE BIOGEL PI IND 6.5 SURGICAL

## (undated) DEVICE — PAD PINK XL PIGAZZI POSTIONING

## (undated) DEVICE — DRAPE TOWEL ADHESIVE 19X30"STL TIBURON UTILITY TRACH

## (undated) DEVICE — ELECTRODE LAP 13 1/2" L HOOK 0118256

## (undated) DEVICE — TUBING IRRIGATION CYSTO

## (undated) DEVICE — TUBING INSUFFLATION DUAL CONNECTOR

## (undated) DEVICE — GUARD NEEDLE MAT DISP FOAM NEEDLE COUNTER

## (undated) DEVICE — CABLE TRUDI TDNC001

## (undated) DEVICE — BLADE 3.0MM X 110MM STRAIGHT SMOOTH BIEN-AIR

## (undated) DEVICE — NEEDLE HYPO 25G 1 1/2" A MONOJECT NONSAFETY SHARP

## (undated) DEVICE — BLADE 4.0MM X 110MM 15Â° CURVED DOUBLE SERRATED CONCAVE BIEN-AIR

## (undated) DEVICE — SCISSOR TIP METZENBAUM 5MM CRV DISP

## (undated) DEVICE — COVER LIGHT HANDLE STERIS

## (undated) DEVICE — TRAY SURESTEP CATH STR TIP 16F 350ML URINE METER SILICONE

## (undated) DEVICE — GLOVE 7 DERMAPRENE ULTRA POWDER FREE 8514

## (undated) DEVICE — PATTIE SURGICAL 1/2 X 3" STL X-RAY DETECTABLE

## (undated) DEVICE — SYRINGE 12ML LUER LOCK TIP STL MONOJECT

## (undated) DEVICE — CATH IV 20G X 1.16" BC PNK INSYTE AUTOG

## (undated) DEVICE — SUTURE MONOCRYL 4-0 PS-2 18" UNDYED

## (undated) DEVICE — GLOVE SENSICARE MICRO PF 7.0

## (undated) DEVICE — GLOVE SURG SZ 7.5 GREEN W/ ALOE VERA

## (undated) DEVICE — GOWN SURG 3X XLNG IMPERVIOUS SMARTSLEEVE

## (undated) DEVICE — GLOVE SENSICARE SLT 6.5 SURG

## (undated) DEVICE — COVER MAYO STAND XLG REINFORCE 30 1/2 X 57

## (undated) DEVICE — GARMENT CALF 18" MED GREEN VASOPRESS

## (undated) DEVICE — CLOSURE SKIN STERISTRIP 1/2" 3M

## (undated) DEVICE — SHEET TABLE COVER 44"W X 75"L PLASTIC CONVERTORS

## (undated) DEVICE — TRAY LAP CHOLE CUSTOM RCRH CUSTOM PACK

## (undated) DEVICE — SPONGE VISTEC 4X4 STL 10'S

## (undated) DEVICE — DRAPE LEGGING CLEAR 30 3/4X55" 6" POLY CUFF LONG

## (undated) DEVICE — TIP RUMI GREEN 6.7MM X 10CM

## (undated) DEVICE — TOWEL DISP BLUE STL 4 PK

## (undated) DEVICE — TRAP MUCOUS SPECIMEN 40CC ST + CONTAINS LATEX

## (undated) DEVICE — KIT ROOM TURNOVER STANDARD 01C INFECTION CONTROL SYSTEM

## (undated) DEVICE — WATER STL IRR 250ML BTL USP PLASTIC POUR BOTTLE

## (undated) DEVICE — POUCH ENDO CATCH 10MM

## (undated) DEVICE — TROCAR BLUNT TIP 12 X 100MM BALLOON HASSON

## (undated) DEVICE — TROCAR CANNULA VERSAONE 5MM BLADED STD

## (undated) DEVICE — SPONGE GAUZE 4X4-12 STL 10'S

## (undated) DEVICE — NEEDLE 18G 1.5" REG BEVEL PRECISIONGLIDE

## (undated) DEVICE — TUBING SUCTION 3/16"X10'

## (undated) DEVICE — BLANKET WARMING LOWER BODY HYPOTHERMIA FORCED AIR

## (undated) DEVICE — SWAB MICROFIBER CLOTH TROCAR AND SCOPES

## (undated) DEVICE — GLOVE SURG SZ 6.5 GREEN W/ ALOE VERA

## (undated) DEVICE — GLOVE SENSICARE 7.0 SURG